# Patient Record
Sex: MALE | Race: WHITE | Employment: OTHER | URBAN - METROPOLITAN AREA
[De-identification: names, ages, dates, MRNs, and addresses within clinical notes are randomized per-mention and may not be internally consistent; named-entity substitution may affect disease eponyms.]

---

## 2020-11-02 ENCOUNTER — TELEPHONE (OUTPATIENT)
Dept: CARDIOLOGY | Facility: CLINIC | Age: 60
End: 2020-11-02

## 2020-11-02 NOTE — TELEPHONE ENCOUNTER
Patient initially called scheduling, who transferred the call to triage because he mentioned having chest pain. Patient has been having SOB going up stairs and chest pain over the past week or so. He said he is not SOB when he is not moving but as soon as he moves,eeven across the room he is SOB. He said he had an episode on Saturday with the pain that lasted quite a while. Today he is not having symptoms when we are talking but he is sitting still. He said he does have a cardiac history from a number of years ago. He was treated in [place] at that time. He saw a DrMahamed at the [facility] once but didn't go back. He has a primary there and none here in the [facility]. I told him if he could get an appointment in the next couple of days he could wait but otherwise he needs to go to the ER. I also told him if he gets worse SOB and/or chest pain before his appointment he needs to go to the ER. Patient has been scheduled 11/3/20 with Dr. Haider.    I called patient back and asked him to contact [place] for permission to release his records. He does not have an STAN[facility]. He said he does have records from [place]. I told him to bring them with and either his medications or a list of his medications. He also took our fax number in case [place] would fax records.

## 2020-11-03 ENCOUNTER — OFFICE VISIT (OUTPATIENT)
Dept: CARDIOLOGY | Facility: CLINIC | Age: 60
End: 2020-11-03
Payer: COMMERCIAL

## 2020-11-03 VITALS
OXYGEN SATURATION: 99 % | HEIGHT: 70 IN | WEIGHT: 230 LBS | HEART RATE: 90 BPM | DIASTOLIC BLOOD PRESSURE: 108 MMHG | SYSTOLIC BLOOD PRESSURE: 195 MMHG | BODY MASS INDEX: 32.93 KG/M2

## 2020-11-03 DIAGNOSIS — I10 MALIGNANT ESSENTIAL HYPERTENSION: ICD-10-CM

## 2020-11-03 DIAGNOSIS — C61 PROSTATE CANCER (H): ICD-10-CM

## 2020-11-03 DIAGNOSIS — I25.110 CORONARY ARTERY DISEASE INVOLVING NATIVE CORONARY ARTERY OF NATIVE HEART WITH UNSTABLE ANGINA PECTORIS (H): Primary | ICD-10-CM

## 2020-11-03 DIAGNOSIS — E78.2 MIXED HYPERLIPIDEMIA: ICD-10-CM

## 2020-11-03 PROCEDURE — 99000 SPECIMEN HANDLING OFFICE-LAB: CPT | Performed by: INTERNAL MEDICINE

## 2020-11-03 PROCEDURE — 36415 COLL VENOUS BLD VENIPUNCTURE: CPT | Performed by: INTERNAL MEDICINE

## 2020-11-03 PROCEDURE — 84244 ASSAY OF RENIN: CPT | Mod: 90 | Performed by: INTERNAL MEDICINE

## 2020-11-03 PROCEDURE — 82088 ASSAY OF ALDOSTERONE: CPT | Performed by: INTERNAL MEDICINE

## 2020-11-03 PROCEDURE — 93000 ELECTROCARDIOGRAM COMPLETE: CPT | Performed by: INTERNAL MEDICINE

## 2020-11-03 PROCEDURE — 99N1160 PR STATISICAL ALDOSTERONE/RENIN RATIO: Performed by: INTERNAL MEDICINE

## 2020-11-03 PROCEDURE — 83835 ASSAY OF METANEPHRINES: CPT | Mod: 90 | Performed by: INTERNAL MEDICINE

## 2020-11-03 PROCEDURE — 99205 OFFICE O/P NEW HI 60 MIN: CPT | Performed by: INTERNAL MEDICINE

## 2020-11-03 RX ORDER — ROSUVASTATIN CALCIUM 20 MG/1
20 TABLET, COATED ORAL DAILY
COMMUNITY
End: 2020-11-04

## 2020-11-03 RX ORDER — AMLODIPINE BESYLATE 10 MG/1
10 TABLET ORAL DAILY
Qty: 90 TABLET | Refills: 3 | Status: ON HOLD | OUTPATIENT
Start: 2020-11-03 | End: 2020-11-14

## 2020-11-03 RX ORDER — BUSPIRONE HYDROCHLORIDE 10 MG/1
10 TABLET ORAL 3 TIMES DAILY
COMMUNITY
End: 2021-04-28

## 2020-11-03 RX ORDER — NITROGLYCERIN 0.3 MG/1
TABLET SUBLINGUAL
Qty: 30 TABLET | Refills: 3 | Status: SHIPPED | OUTPATIENT
Start: 2020-11-03

## 2020-11-03 RX ORDER — TRIAMTERENE AND HYDROCHLOROTHIAZIDE 37.5; 25 MG/1; MG/1
CAPSULE ORAL EVERY MORNING
Status: ON HOLD | COMMUNITY
End: 2020-11-14

## 2020-11-03 RX ORDER — LOSARTAN POTASSIUM 50 MG/1
50 TABLET ORAL DAILY
COMMUNITY
End: 2020-11-15

## 2020-11-03 RX ORDER — POTASSIUM CHLORIDE 1500 MG/1
20 TABLET, EXTENDED RELEASE ORAL DAILY
Status: ON HOLD | COMMUNITY
End: 2020-11-14

## 2020-11-03 ASSESSMENT — MIFFLIN-ST. JEOR: SCORE: 1849.52

## 2020-11-03 NOTE — LETTER
"11/3/2020    Physician No Ref-Primary  No address on file    RE: Adrian BYERS Malinda       Dear Colleague,    I had the pleasure of seeing Adrian Petty in the [facility].    CARDIOLOGY CLINIC CONSULTATION    REASON FOR CONSULT: Chest pain, history of CAD    PRIMARY CARE PHYSICIAN:  Physician No Ref-Primary      HISTORY OF PRESENT ILLNESS:  Patient is a very pleasant 60-year-old male with a history of CAD s/p at least 3 prior stenting episodes at [facility], and [facility] in [place], most recently in 2013, though the records are not available for my review, as well as hypertension, hyperlipidemia, and prostate cancer s/p surgery, chemotherapy, and radiation currently in remission.  The patient had called the scheduling line earlier this week to make an appointment, but was referred for an urgent appointment today due to his unstable symptoms.  He initially stated that over the past week he has developed new, severe shortness of breath and chest discomfort when climbing the stairs that had not been present previously.  On further questioning, though, it does seem like this is getting worse, but it has been slowly building over the course of several months.  He did have one episode, though, on Saturday which was particularly worrisome, where the chest pressure came on as usual but did not resolve for several hours despite rest.  All previous times this has resolved after several minutes of rest.  On that Saturday, he eventually took 2 aspirin, and this helped to relieve the symptoms.  In retrospect, the symptoms feel very similar to the symptoms he had prior to his first stenting procedure.  Today, blood pressure is dramatically elevated at nearly 200 mmHg systolic.  He states that his blood pressure has \"always been high,\" though on my review of the limited care everywhere records available, it appears that within the past couple of years, he has a documented blood pressure " of 140/80, so this does seem to be out of his long-term typical range.  He brings in a blood pressure log from home showing that over at least the past week his blood pressure has been consistently in this 190s to 200s over 100s to 110s range.  He denies any headaches, dizziness, strokelike symptoms, or vision changes.    PAST MEDICAL HISTORY:  See HPI      MEDICATIONS:  Current Outpatient Medications   Medication     amLODIPine (NORVASC) 10 MG tablet     busPIRone (BUSPAR) 10 MG tablet     losartan (COZAAR) 50 MG tablet     potassium chloride ER (KLOR-CON M) 20 MEQ CR tablet     rosuvastatin (CRESTOR) 20 MG tablet     sertraline (ZOLOFT) 50 MG tablet     triamterene-HCTZ (DYAZIDE) 37.5-25 MG capsule     No current facility-administered medications for this visit.        ALLERGIES:  No Known Allergies    SOCIAL HISTORY:  I have reviewed this patient's social history and updated it with pertinent information if needed.   Adrian Petty  reports that he has never smoked. He does not have any smokeless tobacco history on file. He reports current alcohol use.    FAMILY HISTORY:  I have reviewed this patient's family history and updated it with pertinent information if needed.   Family History   Problem Relation Age of Onset     Coronary Artery Disease Father        REVIEW OF SYSTEMS:  I have reviewed this patient's ROS as obtained by clinic staff and updated it with pertinent information if needed.   Skin:  not assessed     Eyes:  not assessed    ENT:  not assessed    Respiratory:  Positive for dyspnea on exertion  Cardiovascular:  Negative;palpitations Positive for;chest pain;lightheadedness;dizziness;fatigue  Gastroenterology: not assessed    Genitourinary:  Positive for prostate problem  Musculoskeletal:  not assessed    Neurologic:  not assessed    Psychiatric:  not assessed    Heme/Lymph/Imm:  Negative    Endocrine:  Negative        PHYSICAL EXAM:      BP: (!) 195/108 Pulse: 90     SpO2: 99 %      Vital Signs  with Ranges  Pulse:  [90] 90  BP: (195)/(108) 195/108  SpO2:  [99 %] 99 %  230 lbs 0 oz    Constitutional:  Awake, alert, no acute distress  Eyes: EOMI, sclera non-icteric  ENT: Trachea midline  Respiratory: Normal respiratory effort, CTAB  Cardiovascular: RRR, no m/r/g.  JVP < 7 cm H2O.  No LE edema.  Normal carotid upstrokes, no carotid bruits.  GI:  Nondistended, nontender.  Skin: Dry, no significant rash on exposed skin  Musculoskeletal:  Strength 5/5 in upper and lower extremities  Psychiatric: Appropriate affect      DATA:    Labs: I have personally reviewed the pertinent cardiac labs.    EKG (11/3/2020): Normal ECG    Echocardiogram: None in our system    Angiogram: None in our system        ASSESSMENT AND RECOMMENDATIONS:    1.  Typical, accelerating angina  2.  History of CAD s/p 3 prior stenting procedures (unknown vessels)  3.  Malignant hypertension, likely subacute to chronic, not consistent with hypertensive emergency  4.  Hyperlipidemia  5.  Prostate cancer s/p surgery, radiation, and chemotherapy, now in remission    -I discussed with the patient and his daughter that it is very likely that he has recurrent obstructive coronary disease, and the accelerating pattern of this angina requires rapid investigation and treatment.  First off, I advised them that if another episode similar to Saturday occurs, he needs to come in immediately to the hospital for assessment.  Otherwise, we will set him up for an urgent coronary angiogram, and I will overbook him on Thursday, November 5.  In light of the Covid pandemic, this will be challenging to coordinate, but we will try and get his Covid test done either later today or first thing Wednesday morning, with the hope of having resulted prior to his appointment.  He also needs an echocardiogram, which I have ordered.    -Continue Crestor 20 daily  -Start baby aspirin daily  -Start amlodipine 10 mg daily, and continue losartan 50 mg daily and triamterene-HCTZ  37.5-25 mg daily  - Work-up for secondary hypertension initiated with reading/Jeremiah ratio, cortisol, metanephrines, TSH, and a renal ultrasound ordered.  -Follow-up in 1 month with PALMA to further titrate antihypertensive regimen.    Risks and benefits of the procedure were discussed with the patient in detail that includes but not limited to the risk of stroke, heart attack, death, cardiac or vascular perforation, emergent stenting or bypass, contrast induced allergic reaction, renal dysfunction (including risks of temporary or permanent dialysis), and pulmonary, sedation, and vascular complications (including bleeding and transfusion). Patient denies any major active bleeding issues and is willing to take and comply with dual antiplatelet therapy and understands associated bleeding risks. Patient understands the overall risks of the procedure and wishes to proceed.              Ted Haider MD  Interventional Cardiology  November 3, 2020            Thank you for allowing me to participate in the care of your patient.      Sincerely,     Ted Haider MD     [facility]    cc:   No referring provider defined for this encounter.

## 2020-11-03 NOTE — PROGRESS NOTES
"CARDIOLOGY CLINIC CONSULTATION    REASON FOR CONSULT: Chest pain, history of CAD    PRIMARY CARE PHYSICIAN:  Physician No Ref-Primary      HISTORY OF PRESENT ILLNESS:  Patient is a very pleasant 60-year-old male with a history of CAD s/p at least 3 prior stenting episodes at [facility], and [facility] in [place], most recently in 2013, though the records are not available for my review, as well as hypertension, hyperlipidemia, and prostate cancer s/p surgery, chemotherapy, and radiation currently in remission.  The patient had called the scheduling line earlier this week to make an appointment, but was referred for an urgent appointment today due to his unstable symptoms.  He initially stated that over the past week he has developed new, severe shortness of breath and chest discomfort when climbing the stairs that had not been present previously.  On further questioning, though, it does seem like this is getting worse, but it has been slowly building over the course of several months.  He did have one episode, though, on Saturday which was particularly worrisome, where the chest pressure came on as usual but did not resolve for several hours despite rest.  All previous times this has resolved after several minutes of rest.  On that Saturday, he eventually took 2 aspirin, and this helped to relieve the symptoms.  In retrospect, the symptoms feel very similar to the symptoms he had prior to his first stenting procedure.  Today, blood pressure is dramatically elevated at nearly 200 mmHg systolic.  He states that his blood pressure has \"always been high,\" though on my review of the limited care everywhere records available, it appears that within the past couple of years, he has a documented blood pressure of 140/80, so this does seem to be out of his long-term typical range.  He brings in a blood pressure log from home showing that over at least the past week his blood pressure has been consistently in this " 190s to 200s over 100s to 110s range.  He denies any headaches, dizziness, strokelike symptoms, or vision changes.    PAST MEDICAL HISTORY:  See HPI      MEDICATIONS:  Current Outpatient Medications   Medication     amLODIPine (NORVASC) 10 MG tablet     busPIRone (BUSPAR) 10 MG tablet     losartan (COZAAR) 50 MG tablet     potassium chloride ER (KLOR-CON M) 20 MEQ CR tablet     rosuvastatin (CRESTOR) 20 MG tablet     sertraline (ZOLOFT) 50 MG tablet     triamterene-HCTZ (DYAZIDE) 37.5-25 MG capsule     No current facility-administered medications for this visit.        ALLERGIES:  No Known Allergies    SOCIAL HISTORY:  I have reviewed this patient's social history and updated it with pertinent information if needed.   Adrian Petty  reports that he has never smoked. He does not have any smokeless tobacco history on file. He reports current alcohol use.    FAMILY HISTORY:  I have reviewed this patient's family history and updated it with pertinent information if needed.   Family History   Problem Relation Age of Onset     Coronary Artery Disease Father        REVIEW OF SYSTEMS:  I have reviewed this patient's ROS as obtained by clinic staff and updated it with pertinent information if needed.   Skin:  not assessed     Eyes:  not assessed    ENT:  not assessed    Respiratory:  Positive for dyspnea on exertion  Cardiovascular:  Negative;palpitations Positive for;chest pain;lightheadedness;dizziness;fatigue  Gastroenterology: not assessed    Genitourinary:  Positive for prostate problem  Musculoskeletal:  not assessed    Neurologic:  not assessed    Psychiatric:  not assessed    Heme/Lymph/Imm:  Negative    Endocrine:  Negative        PHYSICAL EXAM:      BP: (!) 195/108 Pulse: 90     SpO2: 99 %      Vital Signs with Ranges  Pulse:  [90] 90  BP: (195)/(108) 195/108  SpO2:  [99 %] 99 %  230 lbs 0 oz    Constitutional:  Awake, alert, no acute distress  Eyes: EOMI, sclera non-icteric  ENT: Trachea midline  Respiratory:  Normal respiratory effort, CTAB  Cardiovascular: RRR, no m/r/g.  JVP < 7 cm H2O.  No LE edema.  Normal carotid upstrokes, no carotid bruits.  GI:  Nondistended, nontender.  Skin: Dry, no significant rash on exposed skin  Musculoskeletal:  Strength 5/5 in upper and lower extremities  Psychiatric: Appropriate affect      DATA:    Labs: I have personally reviewed the pertinent cardiac labs.    EKG (11/3/2020): Normal ECG    Echocardiogram: None in our system    Angiogram: None in our system        ASSESSMENT AND RECOMMENDATIONS:    1.  Typical, accelerating angina  2.  History of CAD s/p 3 prior stenting procedures (unknown vessels)  3.  Malignant hypertension, likely subacute to chronic, not consistent with hypertensive emergency  4.  Hyperlipidemia  5.  Prostate cancer s/p surgery, radiation, and chemotherapy, now in remission    -I discussed with the patient and his daughter that it is very likely that he has recurrent obstructive coronary disease, and the accelerating pattern of this angina requires rapid investigation and treatment.  First off, I advised them that if another episode similar to Saturday occurs, he needs to come in immediately to the hospital for assessment.  Otherwise, we will set him up for an urgent coronary angiogram, and I will overbook him on Thursday, November 5.  In light of the Covid pandemic, this will be challenging to coordinate, but we will try and get his Covid test done either later today or first thing Wednesday morning, with the hope of having resulted prior to his appointment.  He also needs an echocardiogram, which I have ordered.    -Continue Crestor 20 daily  -Start baby aspirin daily  -Start amlodipine 10 mg daily, and continue losartan 50 mg daily and triamterene-HCTZ 37.5-25 mg daily  - Work-up for secondary hypertension initiated with reading/Jeremiah ratio, cortisol, metanephrines, TSH, and a renal ultrasound ordered.  -Follow-up in 1 month with PALMA to further titrate  antihypertensive regimen.    Risks and benefits of the procedure were discussed with the patient in detail that includes but not limited to the risk of stroke, heart attack, death, cardiac or vascular perforation, emergent stenting or bypass, contrast induced allergic reaction, renal dysfunction (including risks of temporary or permanent dialysis), and pulmonary, sedation, and vascular complications (including bleeding and transfusion). Patient denies any major active bleeding issues and is willing to take and comply with dual antiplatelet therapy and understands associated bleeding risks. Patient understands the overall risks of the procedure and wishes to proceed.              Ted Haider MD  Interventional Cardiology  November 3, 2020

## 2020-11-04 ENCOUNTER — TELEPHONE (OUTPATIENT)
Dept: CARDIOLOGY | Facility: CLINIC | Age: 60
End: 2020-11-04

## 2020-11-04 ENCOUNTER — DOCUMENTATION ONLY (OUTPATIENT)
Dept: CARDIOLOGY | Facility: CLINIC | Age: 60
End: 2020-11-04

## 2020-11-04 DIAGNOSIS — I25.110 CORONARY ARTERY DISEASE INVOLVING NATIVE CORONARY ARTERY OF NATIVE HEART WITH UNSTABLE ANGINA PECTORIS (H): ICD-10-CM

## 2020-11-04 DIAGNOSIS — I10 MALIGNANT ESSENTIAL HYPERTENSION: ICD-10-CM

## 2020-11-04 DIAGNOSIS — I25.110 CORONARY ARTERY DISEASE INVOLVING NATIVE CORONARY ARTERY OF NATIVE HEART WITH UNSTABLE ANGINA PECTORIS (H): Primary | ICD-10-CM

## 2020-11-04 DIAGNOSIS — R97.20 ELEVATED PROSTATE SPECIFIC ANTIGEN (PSA): Primary | ICD-10-CM

## 2020-11-04 LAB
ANION GAP SERPL CALCULATED.3IONS-SCNC: 5 MMOL/L (ref 3–14)
APTT PPP: 27 SEC (ref 22–37)
BUN SERPL-MCNC: 17 MG/DL (ref 7–30)
CALCIUM SERPL-MCNC: 9.1 MG/DL (ref 8.5–10.1)
CHLORIDE SERPL-SCNC: 100 MMOL/L (ref 94–109)
CHOLEST SERPL-MCNC: 239 MG/DL
CO2 SERPL-SCNC: 30 MMOL/L (ref 20–32)
CORTIS SERPL-MCNC: 9 UG/DL (ref 4–22)
CREAT SERPL-MCNC: 0.88 MG/DL (ref 0.66–1.25)
ERYTHROCYTE [DISTWIDTH] IN BLOOD BY AUTOMATED COUNT: 12.9 % (ref 10–15)
GFR SERPL CREATININE-BSD FRML MDRD: >90 ML/MIN/{1.73_M2}
GLUCOSE SERPL-MCNC: 232 MG/DL (ref 70–99)
HBA1C MFR BLD: 8 % (ref 0–5.6)
HCT VFR BLD AUTO: 48.9 % (ref 40–53)
HDLC SERPL-MCNC: 36 MG/DL
HGB BLD-MCNC: 16.6 G/DL (ref 13.3–17.7)
INR PPP: 1.02 (ref 0.86–1.14)
LABORATORY COMMENT REPORT: NORMAL
LDLC SERPL CALC-MCNC: ABNORMAL MG/DL
MCH RBC QN AUTO: 28.9 PG (ref 26.5–33)
MCHC RBC AUTO-ENTMCNC: 33.9 G/DL (ref 31.5–36.5)
MCV RBC AUTO: 85 FL (ref 78–100)
NONHDLC SERPL-MCNC: 203 MG/DL
PLATELET # BLD AUTO: 212 10E9/L (ref 150–450)
POTASSIUM SERPL-SCNC: 3.3 MMOL/L (ref 3.4–5.3)
PSA SERPL-MCNC: 0.17 UG/L (ref 0–4)
RBC # BLD AUTO: 5.75 10E12/L (ref 4.4–5.9)
SARS-COV-2 RNA SPEC QL NAA+PROBE: NEGATIVE
SARS-COV-2 RNA SPEC QL NAA+PROBE: NORMAL
SODIUM SERPL-SCNC: 135 MMOL/L (ref 133–144)
SPECIMEN SOURCE: NORMAL
SPECIMEN SOURCE: NORMAL
TRIGL SERPL-MCNC: 459 MG/DL
TSH SERPL DL<=0.005 MIU/L-ACNC: 0.84 MU/L (ref 0.4–4)
WBC # BLD AUTO: 6.1 10E9/L (ref 4–11)

## 2020-11-04 PROCEDURE — 36415 COLL VENOUS BLD VENIPUNCTURE: CPT | Performed by: INTERNAL MEDICINE

## 2020-11-04 PROCEDURE — 85027 COMPLETE CBC AUTOMATED: CPT | Performed by: INTERNAL MEDICINE

## 2020-11-04 PROCEDURE — 85730 THROMBOPLASTIN TIME PARTIAL: CPT | Performed by: INTERNAL MEDICINE

## 2020-11-04 PROCEDURE — 82533 TOTAL CORTISOL: CPT | Performed by: INTERNAL MEDICINE

## 2020-11-04 PROCEDURE — 80048 BASIC METABOLIC PNL TOTAL CA: CPT | Performed by: INTERNAL MEDICINE

## 2020-11-04 PROCEDURE — 84443 ASSAY THYROID STIM HORMONE: CPT | Performed by: INTERNAL MEDICINE

## 2020-11-04 PROCEDURE — U0003 INFECTIOUS AGENT DETECTION BY NUCLEIC ACID (DNA OR RNA); SEVERE ACUTE RESPIRATORY SYNDROME CORONAVIRUS 2 (SARS-COV-2) (CORONAVIRUS DISEASE [COVID-19]), AMPLIFIED PROBE TECHNIQUE, MAKING USE OF HIGH THROUGHPUT TECHNOLOGIES AS DESCRIBED BY CMS-2020-01-R: HCPCS | Performed by: INTERNAL MEDICINE

## 2020-11-04 PROCEDURE — 85610 PROTHROMBIN TIME: CPT | Performed by: INTERNAL MEDICINE

## 2020-11-04 PROCEDURE — 84153 ASSAY OF PSA TOTAL: CPT | Performed by: INTERNAL MEDICINE

## 2020-11-04 PROCEDURE — 83036 HEMOGLOBIN GLYCOSYLATED A1C: CPT | Performed by: INTERNAL MEDICINE

## 2020-11-04 PROCEDURE — 80061 LIPID PANEL: CPT | Performed by: INTERNAL MEDICINE

## 2020-11-04 RX ORDER — ASPIRIN 81 MG/1
81 TABLET ORAL DAILY
Status: CANCELLED | OUTPATIENT
Start: 2020-11-04 | End: 2020-11-06

## 2020-11-04 RX ORDER — SODIUM CHLORIDE 9 MG/ML
INJECTION, SOLUTION INTRAVENOUS CONTINUOUS
Status: CANCELLED | OUTPATIENT
Start: 2020-11-04

## 2020-11-04 RX ORDER — LIDOCAINE 40 MG/G
CREAM TOPICAL
Status: CANCELLED | OUTPATIENT
Start: 2020-11-04

## 2020-11-04 RX ORDER — POTASSIUM CHLORIDE 1500 MG/1
20 TABLET, EXTENDED RELEASE ORAL
Status: CANCELLED | OUTPATIENT
Start: 2020-11-04

## 2020-11-04 RX ORDER — ROSUVASTATIN CALCIUM 40 MG/1
40 TABLET, COATED ORAL DAILY
Qty: 90 TABLET | Refills: 3 | Status: SHIPPED | OUTPATIENT
Start: 2020-11-04 | End: 2021-05-27

## 2020-11-04 NOTE — TELEPHONE ENCOUNTER
Received result note from Dr. Haider regarding pt's lab results:     Ted Haider MD [facility] Heart Team 1             Hi Rena,     It looks like he has diabetes now, so he needs to establish with a PCP that can help him manage that.     Also, let's increase his Crestor from 20 -> 40 mg daily.     Otherwise, we're just waiting on his COVID test to come back, hopefully it will soon.     Thanks!   Carlos      Called pt and reviewed results, recommendation from Dr. Haider to establish with a PCP to manage diabetes and increase rosuvastatin from 20 to 40 mg daily. Pt verbalized understanding and agreement with plan. Rx sent to pt's preferred pharmacy. Provided pt with phone number for [facility], pt stated he will call and make an appointment to establish with a PCP. Reviewed pt's COVID test has also resulted and was negative.

## 2020-11-04 NOTE — PROGRESS NOTES
Contacted patient to review pre-procedure instructions: patient is scheduled for coronary angiogram on 11/5/20. Patient's arrival time is 8:30 am and procedure time is 10:30 am. Patient is aware to be NPO except for medications after midnight tonight. Patient will hold the medication triamterene-hydrochlorothiazide. Patient has arranged for transportation and someone to stay with him for 24 hours after the procedure. Patient has no known contrast dye allergy. Patient is not on any diabetic medications. Patient is not taking anti-coagulation medication. Patient's renal function is WNL for procedure. Patient will continue daily aspirin dose 81 mg. Orders for procedure entered. Patient verbalized understanding of all instructions. Patient had a COVID test this morning, results pending, this RN called lab and requested result be expedited.  Patient has post-angiogram follow up scheduled on 11/13/20 at 8:00 am with CADEN Radford.     Wellness Screening Tool    Symptom Screening:    Do you have one of the following NEW symptoms:      Fever (subjective or >100.0)? No     New cough? No     Shortness of breath? No     Chills? No     New loss of taste or smell? No     Generalized body aches? No     New persistent headache? No     New sore throat? No     Nausea, vomiting or diarrhea? No     Within the past 2 weeks, have you been exposed to someone with a known positive illness below?      COVID - 19 (known or suspected)? No     Chicken pox? No     Measles? No     Pertussis? No     Have you had a positive COVID-19 diagnostic test (nasal swab test) in the last 14 days or are you currently   on self-quarantine restrictions (i.e.travel restriction, exposure, etc?) No     Patient notified of visitor restriction: Yes   Patient informed to wear a mask: Yes     Patient's appointment status: Patient will keep procedure as scheduled on 11/4/20.

## 2020-11-05 ENCOUNTER — APPOINTMENT (OUTPATIENT)
Dept: ULTRASOUND IMAGING | Facility: CLINIC | Age: 60
End: 2020-11-05
Attending: SURGERY
Payer: COMMERCIAL

## 2020-11-05 ENCOUNTER — HOSPITAL ENCOUNTER (INPATIENT)
Facility: CLINIC | Age: 60
LOS: 9 days | Discharge: HOME | End: 2020-11-14
Attending: INTERNAL MEDICINE | Admitting: HOSPITALIST
Payer: COMMERCIAL

## 2020-11-05 ENCOUNTER — HOSPITAL ENCOUNTER (OUTPATIENT)
Dept: CARDIOLOGY | Facility: CLINIC | Age: 60
Discharge: HOME | End: 2020-11-05
Attending: INTERNAL MEDICINE | Admitting: INTERNAL MEDICINE
Payer: COMMERCIAL

## 2020-11-05 ENCOUNTER — APPOINTMENT (OUTPATIENT)
Dept: GENERAL RADIOLOGY | Facility: CLINIC | Age: 60
End: 2020-11-05
Attending: NURSE PRACTITIONER
Payer: COMMERCIAL

## 2020-11-05 DIAGNOSIS — I25.110 CORONARY ARTERY DISEASE INVOLVING NATIVE CORONARY ARTERY OF NATIVE HEART WITH UNSTABLE ANGINA PECTORIS (H): ICD-10-CM

## 2020-11-05 DIAGNOSIS — Z95.1 S/P CABG X 3: ICD-10-CM

## 2020-11-05 DIAGNOSIS — I25.10 CAD (CORONARY ARTERY DISEASE): ICD-10-CM

## 2020-11-05 DIAGNOSIS — I25.110 CORONARY ARTERY DISEASE INVOLVING NATIVE CORONARY ARTERY OF NATIVE HEART WITH UNSTABLE ANGINA PECTORIS (H): Primary | ICD-10-CM

## 2020-11-05 LAB
ABO + RH BLD: NORMAL
ALBUMIN SERPL-MCNC: 4.2 G/DL (ref 3.4–5)
ALBUMIN UR-MCNC: NEGATIVE MG/DL
ALDOST SERPL-MCNC: 37.3 NG/DL (ref 0–31)
ALP SERPL-CCNC: 61 U/L (ref 40–150)
ALT SERPL W P-5'-P-CCNC: 36 U/L (ref 0–70)
APPEARANCE UR: CLEAR
AST SERPL W P-5'-P-CCNC: 19 U/L (ref 0–45)
BILIRUB DIRECT SERPL-MCNC: 0.1 MG/DL (ref 0–0.2)
BILIRUB SERPL-MCNC: 0.7 MG/DL (ref 0.2–1.3)
BILIRUB UR QL STRIP: NEGATIVE
BLD GP AB SCN SERPL QL: NORMAL
BLOOD BANK CMNT PATIENT-IMP: NORMAL
COLOR UR AUTO: YELLOW
GLUCOSE BLDC GLUCOMTR-MCNC: 340 MG/DL (ref 70–99)
GLUCOSE UR STRIP-MCNC: >1000 MG/DL
HGB UR QL STRIP: NEGATIVE
INR PPP: 1.07 (ref 0.86–1.14)
KETONES UR STRIP-MCNC: 10 MG/DL
LEUKOCYTE ESTERASE UR QL STRIP: NEGATIVE
MUCOUS THREADS #/AREA URNS LPF: PRESENT /LPF
NITRATE UR QL: NEGATIVE
PH UR STRIP: 6 PH (ref 5–7)
PROT SERPL-MCNC: 8 G/DL (ref 6.8–8.8)
RBC #/AREA URNS AUTO: <1 /HPF (ref 0–2)
SOURCE: ABNORMAL
SP GR UR STRIP: 1.01 (ref 1–1.03)
SPECIMEN EXP DATE BLD: NORMAL
SPECIMEN EXP DATE BLD: NORMAL
TROPONIN I SERPL-MCNC: 0.04 UG/L (ref 0–0.04)
TROPONIN I SERPL-MCNC: <0.015 UG/L (ref 0–0.04)
UROBILINOGEN UR STRIP-MCNC: NORMAL MG/DL (ref 0–2)
WBC #/AREA URNS AUTO: <1 /HPF (ref 0–5)

## 2020-11-05 PROCEDURE — 999N001017 HC STATISTIC GLUCOSE BY METER IP

## 2020-11-05 PROCEDURE — 86900 BLOOD TYPING SEROLOGIC ABO: CPT | Performed by: SURGERY

## 2020-11-05 PROCEDURE — 250N000009 HC RX 250: Performed by: NURSE PRACTITIONER

## 2020-11-05 PROCEDURE — 93306 TTE W/DOPPLER COMPLETE: CPT

## 2020-11-05 PROCEDURE — 258N000003 HC RX IP 258 OP 636: Performed by: INTERNAL MEDICINE

## 2020-11-05 PROCEDURE — 86901 BLOOD TYPING SEROLOGIC RH(D): CPT | Performed by: SURGERY

## 2020-11-05 PROCEDURE — 93306 TTE W/DOPPLER COMPLETE: CPT | Mod: 26 | Performed by: INTERNAL MEDICINE

## 2020-11-05 PROCEDURE — 86900 BLOOD TYPING SEROLOGIC ABO: CPT | Performed by: INTERNAL MEDICINE

## 2020-11-05 PROCEDURE — 999N000071 HC STATISTIC HEART CATH LAB OR EP LAB

## 2020-11-05 PROCEDURE — 999N000184 HC STATISTIC TELEMETRY

## 2020-11-05 PROCEDURE — 272N000001 HC OR GENERAL SUPPLY STERILE: Performed by: INTERNAL MEDICINE

## 2020-11-05 PROCEDURE — 86850 RBC ANTIBODY SCREEN: CPT | Performed by: SURGERY

## 2020-11-05 PROCEDURE — 93010 ELECTROCARDIOGRAM REPORT: CPT | Performed by: INTERNAL MEDICINE

## 2020-11-05 PROCEDURE — 250N000013 HC RX MED GY IP 250 OP 250 PS 637: Performed by: NURSE PRACTITIONER

## 2020-11-05 PROCEDURE — 36415 COLL VENOUS BLD VENIPUNCTURE: CPT | Performed by: NURSE PRACTITIONER

## 2020-11-05 PROCEDURE — 250N000011 HC RX IP 250 OP 636: Performed by: NURSE PRACTITIONER

## 2020-11-05 PROCEDURE — 36415 COLL VENOUS BLD VENIPUNCTURE: CPT | Performed by: INTERNAL MEDICINE

## 2020-11-05 PROCEDURE — 71045 X-RAY EXAM CHEST 1 VIEW: CPT

## 2020-11-05 PROCEDURE — 250N000011 HC RX IP 250 OP 636: Performed by: INTERNAL MEDICINE

## 2020-11-05 PROCEDURE — 250N000013 HC RX MED GY IP 250 OP 250 PS 637: Performed by: INTERNAL MEDICINE

## 2020-11-05 PROCEDURE — 81001 URINALYSIS AUTO W/SCOPE: CPT | Performed by: SURGERY

## 2020-11-05 PROCEDURE — 80076 HEPATIC FUNCTION PANEL: CPT | Performed by: SURGERY

## 2020-11-05 PROCEDURE — 93970 EXTREMITY STUDY: CPT

## 2020-11-05 PROCEDURE — C1894 INTRO/SHEATH, NON-LASER: HCPCS | Performed by: INTERNAL MEDICINE

## 2020-11-05 PROCEDURE — 210N000002 HC R&B HEART CARE

## 2020-11-05 PROCEDURE — B2111ZZ FLUOROSCOPY OF MULTIPLE CORONARY ARTERIES USING LOW OSMOLAR CONTRAST: ICD-10-PCS | Performed by: INTERNAL MEDICINE

## 2020-11-05 PROCEDURE — C1725 CATH, TRANSLUMIN NON-LASER: HCPCS | Performed by: INTERNAL MEDICINE

## 2020-11-05 PROCEDURE — 99207 PR CDG-MDM COMPONENT: MEETS HIGH - UP CODED: CPT | Performed by: HOSPITALIST

## 2020-11-05 PROCEDURE — 85610 PROTHROMBIN TIME: CPT | Performed by: NURSE PRACTITIONER

## 2020-11-05 PROCEDURE — 93454 CORONARY ARTERY ANGIO S&I: CPT | Performed by: INTERNAL MEDICINE

## 2020-11-05 PROCEDURE — 99223 1ST HOSP IP/OBS HIGH 75: CPT | Mod: AI | Performed by: HOSPITALIST

## 2020-11-05 PROCEDURE — 250N000012 HC RX MED GY IP 250 OP 636 PS 637: Performed by: HOSPITALIST

## 2020-11-05 PROCEDURE — 99291 CRITICAL CARE FIRST HOUR: CPT | Performed by: NURSE PRACTITIONER

## 2020-11-05 PROCEDURE — 250N000013 HC RX MED GY IP 250 OP 250 PS 637: Performed by: HOSPITALIST

## 2020-11-05 PROCEDURE — 250N000009 HC RX 250: Performed by: INTERNAL MEDICINE

## 2020-11-05 PROCEDURE — 84484 ASSAY OF TROPONIN QUANT: CPT | Performed by: NURSE PRACTITIONER

## 2020-11-05 PROCEDURE — 36415 COLL VENOUS BLD VENIPUNCTURE: CPT

## 2020-11-05 PROCEDURE — 93880 EXTRACRANIAL BILAT STUDY: CPT

## 2020-11-05 PROCEDURE — 93005 ELECTROCARDIOGRAM TRACING: CPT

## 2020-11-05 RX ORDER — ATROPINE SULFATE 0.1 MG/ML
0.5 INJECTION INTRAVENOUS
Status: ACTIVE | OUTPATIENT
Start: 2020-11-05 | End: 2020-11-05

## 2020-11-05 RX ORDER — DEXTROSE MONOHYDRATE 25 G/50ML
25-50 INJECTION, SOLUTION INTRAVENOUS
Status: DISCONTINUED | OUTPATIENT
Start: 2020-11-05 | End: 2020-11-08

## 2020-11-05 RX ORDER — SODIUM CHLORIDE 9 MG/ML
INJECTION, SOLUTION INTRAVENOUS CONTINUOUS
Status: DISCONTINUED | OUTPATIENT
Start: 2020-11-05 | End: 2020-11-09

## 2020-11-05 RX ORDER — LIDOCAINE 40 MG/G
CREAM TOPICAL
Status: DISCONTINUED | OUTPATIENT
Start: 2020-11-05 | End: 2020-11-05 | Stop reason: HOSPADM

## 2020-11-05 RX ORDER — HYDRALAZINE HYDROCHLORIDE 20 MG/ML
20 INJECTION INTRAMUSCULAR; INTRAVENOUS ONCE
Status: COMPLETED | OUTPATIENT
Start: 2020-11-05 | End: 2020-11-05

## 2020-11-05 RX ORDER — ASPIRIN 81 MG/1
81 TABLET ORAL DAILY
Status: DISCONTINUED | OUTPATIENT
Start: 2020-11-05 | End: 2020-11-05 | Stop reason: HOSPADM

## 2020-11-05 RX ORDER — ONDANSETRON 4 MG/1
4 TABLET, ORALLY DISINTEGRATING ORAL EVERY 6 HOURS PRN
Status: DISCONTINUED | OUTPATIENT
Start: 2020-11-05 | End: 2020-11-05

## 2020-11-05 RX ORDER — ACETAMINOPHEN 325 MG/1
650 TABLET ORAL EVERY 4 HOURS PRN
Status: DISCONTINUED | OUTPATIENT
Start: 2020-11-05 | End: 2020-11-05

## 2020-11-05 RX ORDER — FLUMAZENIL 0.1 MG/ML
0.2 INJECTION, SOLUTION INTRAVENOUS
Status: ACTIVE | OUTPATIENT
Start: 2020-11-05 | End: 2020-11-05

## 2020-11-05 RX ORDER — NITROGLYCERIN 5 MG/ML
VIAL (ML) INTRAVENOUS
Status: DISCONTINUED | OUTPATIENT
Start: 2020-11-05 | End: 2020-11-05 | Stop reason: HOSPADM

## 2020-11-05 RX ORDER — ONDANSETRON 4 MG/1
4 TABLET, ORALLY DISINTEGRATING ORAL EVERY 6 HOURS PRN
Status: DISCONTINUED | OUTPATIENT
Start: 2020-11-05 | End: 2020-11-09

## 2020-11-05 RX ORDER — NITROGLYCERIN 0.4 MG/1
0.4 TABLET SUBLINGUAL EVERY 5 MIN PRN
Status: DISCONTINUED | OUTPATIENT
Start: 2020-11-05 | End: 2020-11-09

## 2020-11-05 RX ORDER — LIDOCAINE 40 MG/G
CREAM TOPICAL
Status: DISCONTINUED | OUTPATIENT
Start: 2020-11-05 | End: 2020-11-05

## 2020-11-05 RX ORDER — AMLODIPINE BESYLATE 10 MG/1
10 TABLET ORAL DAILY
Status: DISCONTINUED | OUTPATIENT
Start: 2020-11-06 | End: 2020-11-09

## 2020-11-05 RX ORDER — ONDANSETRON 2 MG/ML
4 INJECTION INTRAMUSCULAR; INTRAVENOUS EVERY 6 HOURS PRN
Status: DISCONTINUED | OUTPATIENT
Start: 2020-11-05 | End: 2020-11-09

## 2020-11-05 RX ORDER — HEPARIN SODIUM 1000 [USP'U]/ML
INJECTION, SOLUTION INTRAVENOUS; SUBCUTANEOUS
Status: DISCONTINUED | OUTPATIENT
Start: 2020-11-05 | End: 2020-11-05 | Stop reason: HOSPADM

## 2020-11-05 RX ORDER — NALOXONE HYDROCHLORIDE 0.4 MG/ML
.1-.4 INJECTION, SOLUTION INTRAMUSCULAR; INTRAVENOUS; SUBCUTANEOUS
Status: DISCONTINUED | OUTPATIENT
Start: 2020-11-05 | End: 2020-11-09

## 2020-11-05 RX ORDER — ACETAMINOPHEN 650 MG/1
650 SUPPOSITORY RECTAL EVERY 4 HOURS PRN
Status: DISCONTINUED | OUTPATIENT
Start: 2020-11-05 | End: 2020-11-09

## 2020-11-05 RX ORDER — IOPAMIDOL 755 MG/ML
INJECTION, SOLUTION INTRAVASCULAR
Status: DISCONTINUED | OUTPATIENT
Start: 2020-11-05 | End: 2020-11-05 | Stop reason: HOSPADM

## 2020-11-05 RX ORDER — LIDOCAINE 40 MG/G
CREAM TOPICAL
Status: DISCONTINUED | OUTPATIENT
Start: 2020-11-05 | End: 2020-11-09

## 2020-11-05 RX ORDER — ACETAMINOPHEN 325 MG/1
650 TABLET ORAL EVERY 4 HOURS PRN
Status: DISCONTINUED | OUTPATIENT
Start: 2020-11-05 | End: 2020-11-09

## 2020-11-05 RX ORDER — POTASSIUM CHLORIDE 1500 MG/1
20 TABLET, EXTENDED RELEASE ORAL
Status: DISCONTINUED | OUTPATIENT
Start: 2020-11-05 | End: 2020-11-05 | Stop reason: HOSPADM

## 2020-11-05 RX ORDER — NICOTINE POLACRILEX 4 MG
15-30 LOZENGE BUCCAL
Status: DISCONTINUED | OUTPATIENT
Start: 2020-11-05 | End: 2020-11-08

## 2020-11-05 RX ORDER — ASPIRIN 81 MG/1
81 TABLET ORAL DAILY
Status: DISCONTINUED | OUTPATIENT
Start: 2020-11-06 | End: 2020-11-09

## 2020-11-05 RX ORDER — CARVEDILOL 12.5 MG/1
12.5 TABLET ORAL 2 TIMES DAILY WITH MEALS
Qty: 30 TABLET | Refills: 1 | Status: SHIPPED | OUTPATIENT
Start: 2020-11-05 | End: 2020-11-14

## 2020-11-05 RX ORDER — POLYETHYLENE GLYCOL 3350 17 G/17G
17 POWDER, FOR SOLUTION ORAL DAILY
Status: DISCONTINUED | OUTPATIENT
Start: 2020-11-05 | End: 2020-11-09

## 2020-11-05 RX ORDER — AMOXICILLIN 250 MG
2 CAPSULE ORAL 2 TIMES DAILY
Status: DISCONTINUED | OUTPATIENT
Start: 2020-11-05 | End: 2020-11-09

## 2020-11-05 RX ORDER — AMOXICILLIN 250 MG
1 CAPSULE ORAL 2 TIMES DAILY
Status: DISCONTINUED | OUTPATIENT
Start: 2020-11-05 | End: 2020-11-09

## 2020-11-05 RX ORDER — NALOXONE HYDROCHLORIDE 0.4 MG/ML
.2-.4 INJECTION, SOLUTION INTRAMUSCULAR; INTRAVENOUS; SUBCUTANEOUS
Status: DISCONTINUED | OUTPATIENT
Start: 2020-11-05 | End: 2020-11-05

## 2020-11-05 RX ORDER — ROSUVASTATIN CALCIUM 20 MG/1
40 TABLET, COATED ORAL DAILY
Status: DISCONTINUED | OUTPATIENT
Start: 2020-11-06 | End: 2020-11-14 | Stop reason: HOSPADM

## 2020-11-05 RX ORDER — NITROGLYCERIN 0.4 MG/1
0.4 TABLET SUBLINGUAL EVERY 5 MIN PRN
Status: DISCONTINUED | OUTPATIENT
Start: 2020-11-05 | End: 2020-11-05

## 2020-11-05 RX ORDER — ONDANSETRON 2 MG/ML
4 INJECTION INTRAMUSCULAR; INTRAVENOUS EVERY 6 HOURS PRN
Status: DISCONTINUED | OUTPATIENT
Start: 2020-11-05 | End: 2020-11-05

## 2020-11-05 RX ORDER — FENTANYL CITRATE 50 UG/ML
INJECTION, SOLUTION INTRAMUSCULAR; INTRAVENOUS
Status: DISCONTINUED | OUTPATIENT
Start: 2020-11-05 | End: 2020-11-05 | Stop reason: HOSPADM

## 2020-11-05 RX ORDER — FENTANYL CITRATE 50 UG/ML
25-50 INJECTION, SOLUTION INTRAMUSCULAR; INTRAVENOUS
Status: ACTIVE | OUTPATIENT
Start: 2020-11-05 | End: 2020-11-05

## 2020-11-05 RX ORDER — NITROGLYCERIN 20 MG/100ML
10-200 INJECTION INTRAVENOUS CONTINUOUS
Status: DISCONTINUED | OUTPATIENT
Start: 2020-11-05 | End: 2020-11-09

## 2020-11-05 RX ORDER — BUSPIRONE HYDROCHLORIDE 10 MG/1
10 TABLET ORAL 3 TIMES DAILY
Status: DISCONTINUED | OUTPATIENT
Start: 2020-11-05 | End: 2020-11-14 | Stop reason: HOSPADM

## 2020-11-05 RX ORDER — VERAPAMIL HYDROCHLORIDE 2.5 MG/ML
INJECTION, SOLUTION INTRAVENOUS
Status: DISCONTINUED | OUTPATIENT
Start: 2020-11-05 | End: 2020-11-05 | Stop reason: HOSPADM

## 2020-11-05 RX ORDER — SODIUM CHLORIDE 9 MG/ML
INJECTION, SOLUTION INTRAVENOUS CONTINUOUS
Status: DISCONTINUED | OUTPATIENT
Start: 2020-11-05 | End: 2020-11-05 | Stop reason: HOSPADM

## 2020-11-05 RX ADMIN — ACETAMINOPHEN 650 MG: 325 TABLET, FILM COATED ORAL at 14:00

## 2020-11-05 RX ADMIN — SODIUM CHLORIDE: 9 INJECTION, SOLUTION INTRAVENOUS at 09:22

## 2020-11-05 RX ADMIN — NITROGLYCERIN 0.4 MG: 0.4 TABLET SUBLINGUAL at 16:49

## 2020-11-05 RX ADMIN — MELATONIN 5 MG TABLET 10 MG: at 22:23

## 2020-11-05 RX ADMIN — NITROGLYCERIN 0.4 MG: 0.4 TABLET SUBLINGUAL at 16:33

## 2020-11-05 RX ADMIN — BUSPIRONE HYDROCHLORIDE 10 MG: 10 TABLET ORAL at 22:23

## 2020-11-05 RX ADMIN — SERTRALINE HYDROCHLORIDE 50 MG: 50 TABLET ORAL at 20:11

## 2020-11-05 RX ADMIN — HYDRALAZINE HYDROCHLORIDE 20 MG: 20 INJECTION INTRAMUSCULAR; INTRAVENOUS at 13:14

## 2020-11-05 RX ADMIN — SODIUM CHLORIDE: 9 INJECTION, SOLUTION INTRAVENOUS at 18:20

## 2020-11-05 RX ADMIN — ONDANSETRON 4 MG: 2 INJECTION INTRAMUSCULAR; INTRAVENOUS at 16:58

## 2020-11-05 RX ADMIN — LIDOCAINE HYDROCHLORIDE 30 ML: 20 SOLUTION ORAL; TOPICAL at 17:56

## 2020-11-05 RX ADMIN — INSULIN ASPART 2 UNITS: 100 INJECTION, SOLUTION INTRAVENOUS; SUBCUTANEOUS at 22:24

## 2020-11-05 RX ADMIN — NITROGLYCERIN 10 MCG/MIN: 20 INJECTION INTRAVENOUS at 17:54

## 2020-11-05 RX ADMIN — ASPIRIN 81 MG: 81 TABLET, COATED ORAL at 09:31

## 2020-11-05 NOTE — PROGRESS NOTES
Care Suites Admission Nursing Note    Patient Information  Name: Adrian Petty  Age: 60 year old  Reason for admission: angiogram  Care Suites arrival time: 0830    Visitor Information  Name: Martha  Informed of visitor restrictions: Yes  1 visitor allowed per patient   Visitor must screen negative for COVID symptoms   Visitor must wear a mask  Waiting rooms closed to visitors    Patient Admission/Assessment   Pre-procedure assessment complete: Yes  If abnormal assessment/labs, provider notified: Yes  NPO: Yes  Medications held per instructions/orders: Yes  Consent: obtained  If applicable, pregnancy test status: declined  Patient oriented to room: Yes  Education/questions answered: Yes  Plan/other: proceed    Discharge Planning  Discharge name/phone number: Martha sloan - [phone]  Overnight post sedation caregiver: above  Discharge location: home    Ted Mistry RN           Step 2 Visitor Screening    1. Name of Visitor (1 visitor per patient): martha    2. In the last month, have you been in contact with someone who was confirmed or suspected to have Coronavirus/COVID-19? No    3. Do you have the following symptoms?  Fever/Chills? No   Cough? No   Shortness of breath? No   Skin rash? No   Loss of taste or smell? No  Sore throat? No  Runny or stuffy nose? No  Muscle or body aches? No  Headaches? No  Fatigue? No  Vomiting or diarrhea? No

## 2020-11-05 NOTE — PROGRESS NOTES
Care Suites Post Procedure Note    Patient Information  Name: Adrian Petty  Age: 60 year old    Post Procedure  Time patient returned to Care Suites: 1324  Concerns/abnormal assessment: VSS. No pain. Right radial soft, CDI. dgtr here. Cath lab rn spoke with them.  If abnormal assessment, provider notified: N/A  Plan/Other: per orders    Leni Russo, RN

## 2020-11-05 NOTE — CODE/RAPID RESPONSE
[facility]    RRT Note  11/5/2020   Time Called: 1619    Code Status: No Order    I was called to evaluate Adrian Petty for acute chest pain, who is a 60 year old male who was admitted on 11/5/2020 for elective angiography. PMH includes uncontrolled diabetes.    Prior to the elective angiography, the patient has been experiencing dyspnea and chest pain on exertion, with uncontrolled hypertension prompting an elective angiogram with cardiology 11/5.  Post angiogram, patient reported chest pain 3/10 with dyspnea, and one episode of vomiting prompting RRT.    Assessment & Plan     Unstable angina 2/2 multivessel disease: On my exam, the patient is awake sitting upright alert, in no distress.  He endorses 3/10 substernal pain and heaviness, with associated dyspnea and nausea/vomiting.  Patient was hypertensive in the 180s/90s, heart rate 90s, no hypoxia and afebrile.  EKG was obtained showing no acute ischemic ST elevation or depression, normal sinus rhythm.  After 1 sublingual nitroglycerin, the patient's dyspnea had improved, but chest pain was remained at approximately 3/10.  A second nitroglycerin was administered which brought his chest pain down to 0-1/10.     Given his unstable angina, I discussed the case with Dr. Haider of interventional cardiology, who agreed the patient should be admitted for nitroglycerin infusion overnight.  CV surgery consult in the morning.    INTERVENTIONS:  -EKG  -Sublingual nitroglycerin x2  -Nitroglycerin infusion  -Zofran 4 mg IV x1  -Troponin, INR  -Portable CXR - no acute pulmonary abnormalities    I discussed the case with Dr. Herrera, hospitalist who is amenable to admitting the patient to CCU tonight.  I updated the patient, his daughter about the plan for admission, consult of CV surgery.      COLIN Medina [facility]  Hospitalist Service - [facility] [place]  Pager: [phone] (7a - 6p)      Physical Exam   Vital Signs with Ranges:  Temp:  [99  F (37.2   C)] 99  F (37.2  C)  Pulse:  [] 94  Resp:  [9-18] 9  BP: (129-187)/() 144/82  SpO2:  [94 %-99 %] 96 %  I/O last 3 completed shifts:  In: 480 [P.O.:480]  Out: -       Physical Exam  Vitals signs and nursing note reviewed.   Constitutional:       General: He is not in acute distress.     Appearance: Normal appearance.   HENT:      Head: Normocephalic and atraumatic.   Eyes:      Pupils: Pupils are equal, round, and reactive to light.   Cardiovascular:      Rate and Rhythm: Normal rate and regular rhythm.      Pulses: Normal pulses.      Heart sounds: Normal heart sounds. No murmur.   Pulmonary:      Effort: Pulmonary effort is normal. No respiratory distress.      Breath sounds: Normal breath sounds. No wheezing.   Abdominal:      General: Abdomen is flat. There is no distension.      Palpations: Abdomen is soft.      Tenderness: There is no abdominal tenderness.   Musculoskeletal: Normal range of motion.   Skin:     General: Skin is warm and dry.      Capillary Refill: Capillary refill takes less than 2 seconds.   Neurological:      General: No focal deficit present.      Mental Status: He is oriented to person, place, and time. Mental status is at baseline.   Psychiatric:         Mood and Affect: Mood normal.         Thought Content: Thought content normal.        Data     EKG:  Interpreted by COLIN Medina CNP  Time reviewed: 1611  Symptoms at time of EKG: chest pain 3/10   Rhythm: normal sinus   Rate: Normal  Axis: Normal  Ectopy: none  Conduction: normal  ST Segments/ T Waves: No ST-T wave changes, No acute ischemic changes and Non-specific ST-T wave changes  Q Waves: none  Comparison to prior: Unchanged    Clinical Impression: no acute changes and non-specific EKG    ABG:  -No lab results found in last 7 days.    Troponin:    No lab results found.    IMAGING: (X-ray/CT/MRI)   Recent Results (from the past 24 hour(s))   Echocardiogram Complete    Narrative     [id number]  [id number]  [id number]  [id number]^MYRON^AMELIE^DAMON           [facility]  Echocardiography Laboratory  [address] [place] [address]        Name: ADRIANA AGUILERA  MRN: [id number]  : 1960  Study Date: 2020 09:38 AM  Age: 60 yrs  Gender: Male  Patient Location: [facility]  Reason For Study: Coronary artery disease  Ordering Physician: AMELIE SANCHES  Referring Physician: AMELIE SANCHES  Performed By: Laurita Schwab     BSA: 2.2 m2  Height: 70 in  Weight: 230 lb  HR: 70  BP: 129/93 mmHg  _____________________________________________________________________________  __        Procedure  Complete Portable Echo Adult.  _____________________________________________________________________________  __        Interpretation Summary     1. Normal left ventricular size and systolic function. LVEF 60-65%.  2. No regional wall motion abnormalities.  3. Normal right ventricular size and systolic function.  4. No significant valve disease.     There is no comparison study available.  _____________________________________________________________________________  __        Left Ventricle  The left ventricle is normal in size. There is borderline concentric left  ventricular hypertrophy. Left ventricular systolic function is normal. The  visual ejection fraction is estimated at 60-65%. Grade I or early diastolic  dysfunction. No regional wall motion abnormalities noted.     Right Ventricle  The right ventricle is normal in size and function.     Atria  Normal left atrial size. Right atrial size is normal. There is no color  Doppler evidence of an atrial shunt.     Mitral Valve  The mitral valve leaflets appear normal. There is no evidence of stenosis,  fluttering, or prolapse. There is trace mitral regurgitation.        Tricuspid Valve  Normal tricuspid valve. There is trace tricuspid regurgitation. Right  ventricular systolic pressure could not be approximated due to  inadequate  tricuspid regurgitation.     Aortic Valve  The aortic valve is trileaflet with aortic valve sclerosis. No aortic  regurgitation is present. No aortic stenosis is present.     Pulmonic Valve  The pulmonic valve is not well seen, but is grossly normal. There is trace  pulmonic valvular regurgitation.     Vessels  The aortic root is normal size. Normal size ascending aorta. The inferior vena  cava cannot be assessed.     Pericardium  There is no pericardial effusion.        Rhythm  Sinus rhythm was noted.  _____________________________________________________________________________  __  MMode/2D Measurements & Calculations  IVSd: 1.5 cm     LVIDd: 4.9 cm  LVIDs: 3.7 cm  LVPWd: 1.2 cm  FS: 26.0 %  LV mass(C)d: 272.6 grams  LV mass(C)dI: 123.0 grams/m2  Ao root diam: 3.2 cm  LA dimension: 3.8 cm  asc Aorta Diam: 3.3 cm  LA/Ao: 1.2  LVOT diam: 2.3 cm  LVOT area: 4.2 cm2  LA Volume (BP): 50.3 ml  LA Volume Index (BP): 22.7 ml/m2  RWT: 0.50           Doppler Measurements & Calculations  MV E max mayte: 57.7 cm/sec  MV A max mayte: 90.7 cm/sec  MV E/A: 0.64  MV dec time: 0.26 sec  PA V2 max: 97.7 cm/sec  PA max PG: 3.8 mmHg  PA acc time: 0.12 sec  E/E' avg: 15.0  Lateral E/e': 11.7  Medial E/e': 18.3           _____________________________________________________________________________  __           Report approved by: Dr Asya Dial 11/05/2020 10:53 AM      Cardiac Catheterization    Narrative    Coronary angiography brief summary  Left main: Minimal disease  LAD: Diffuse mild disease throughout.  In-stent restenosis at mid LAD.    Distal LAD has severe disease.  D1 has a large territory with branching   into 2.  It is with severe disease.  LCx: Small territory.   at the proximal LCx.  Bridging collateral to   the distal LCx.  RCA: Dominant vessel.  Diffuse mild disease.  It gives rise to large PL   branch.  RPDA is .  Unclear with the bifurcation.  Collateralized from   LAD  apical.    Conclusions  -Three-vessel coronary artery disease with right PDA , more LCx ,   diagonal 1, and mid LAD ISR.  -Diabetes mellitus    Plans  -Surgical revascularization consultation  -We will start carvedilol 12.5 mg twice daily for hypertension and CAD             CBC with Diff:  Recent Labs   Lab Test 11/04/20  0808   WBC 6.1   HGB 16.6   MCV 85      INR 1.02     Comprehensive Metabolic Panel:  Recent Labs   Lab 11/05/20  0845 11/04/20  0808   NA  --  135   POTASSIUM  --  3.3*   CHLORIDE  --  100   CO2  --  30   ANIONGAP  --  5   GLC  --  232*   BUN  --  17   CR  --  0.88   GFRESTIMATED  --  >90   GFRESTBLACK  --  >90   NADEEN  --  9.1   PROTTOTAL 8.0  --    ALBUMIN 4.2  --    BILITOTAL 0.7  --    ALKPHOS 61  --    AST 19  --    ALT 36  --        INR:    Recent Labs   Lab Test 11/04/20  0808   INR 1.02       D-DIMER:  No components found for: DDIMER    UA:  Recent Labs   Lab 11/05/20  1500   COLOR Yellow   APPEARANCE Clear   URINEGLC >1000*   URINEBILI Negative   URINEKETONE 10*   SG 1.015   UBLD Negative   URINEPH 6.0   PROTEIN Negative   NITRITE Negative   LEUKEST Negative   RBCU <1   WBCU <1       Time Spent on this Encounter     I spent 49 minutes (1611 - 1700) of critical care time on the unit/floor managing the care of Adrian Petty. Upon evaluation, this patient had a high probability of imminent or life-threatening deterioration due to unstable angina which required my direct attention, intervention, and personal management. 100% of my time was spent at the bedside counseling the patient and/or coordinating care regarding services listed in this note.

## 2020-11-05 NOTE — PROGRESS NOTES
RRT called at 1620 after waiting 10 minutes for MD response to page.    Patient vomited and complaint of chest pain.  Stat EKG revealed sinus rhythm

## 2020-11-05 NOTE — PROGRESS NOTES
1735 pt transfering with flying crowe rn to [address] [place] [address]. VSS. Radial site soft, CDi with bandaid.

## 2020-11-05 NOTE — H&P
[facility]    History and Physical - Hospitalist Service       Date of Admission:  11/5/2020    Assessment & Plan   Adrian Petty is a 60 year old male with a history of CAD status post stenting at [facility] in [place] as recent as 2013, hypertension, hyperlipidemia, prostate cancer status post surgery, chemo, and radiation, and new diagnosis of type 2 diabetes who has been experiencing worsening exertional shortness of breath and chest discomfort.  Cardiology was following the patient and moved up his elective/outpatient angiogram to today.  Subsequent to the angiogram which showed multivessel disease, patient was being evaluated for CABG consideration with ultrasound of the legs and neck when he experienced sudden onset of shortness of breath, chest pain, nausea and vomiting.  He is subsequently admitted with nitroglycerin infusion ongoing to the CICU.  Work-up for possible coronary bypass continues.      CAD - multivessel disease  Prior stenting at [facility] - most recently at 2013  Ongoing progressive exertional SOB and chest pain. Angiogram today with MVD. Subsequently developed chest pain, N/V, and sob during US. RRT called, received SLN x2 with some improvement. EKG not acute. Trop negative.  - Cardiology consulted and following. Ongoing work up for pre-op CABG to continue.  - CT surgery consulted  - trend troponin  - telemetry   - nitroglycerin infusion continued  - PTA statin, amlodipine continued   - ASA ongoing    Hypertension  Hyperlipidemia  BP elevated earlier today, improved since.  -Nitroglycerin infusion as above  - PTA regimen (as above) to continue as appropriate-defer to cardiology    T2 Diabetes Mellitus  A1c 8.0% 11/4.   - sliding scale insulin for now  - Prior to discharge will need diabetic education and proper follow up    History of prostate cancer, s/p resection, chemo, and radiation  Noted. No change in management while in hospital for  now.    Asymptomatic COVID 19 screening  Felt to be low suspicion. No special precautions indicated.  - negative swab 11/4      Diet: Advance Diet as Tolerated: Regular Diet Adult    DVT Prophylaxis: Pneumatic Compression Devices  Kraft Catheter: not present  Code Status:  Full Code         Disposition Plan   Expected discharge: unclear, at least a few days likely if CABG is pursued   Entered: Jake Herrera MD 11/05/2020, 5:24 PM     The patient's care was discussed with the Patient and Patient's Family.    Jake Herrera MD  [facility]  Contact information available via [facility] Paging/Directory      ______________________________________________________________________    Chief Complaint   Worsening exertional shortness of breath chest pain    History is obtained from the patient    History of Present Illness   Adrian Petty is a 60 year old male with a history of CAD status post stenting at [facility] in [place] as recent as 2013, hypertension, hyperlipidemia, prostate cancer status post surgery, chemo, and radiation, and new diagnosis of type 2 diabetes who has been experiencing worsening exertional shortness of breath and chest discomfort.  Cardiology was following the patient and moved up his angiogram to today.  Multivessel disease was found on the angiogram with plans for CT surgery work-up and evaluation for consideration of bypass surgery.  Apparently he was getting ultrasound of the lower extremities when he began to have chest pain, nausea and he ended up vomiting.  RRT was called at 420 with stat EKG showing sinus rhythm and reportedly no overt acute ischemic changes.  Troponin has also resulted since that time and was negative.  Patient was given sublingual nitroglycerin which improved his dyspnea and a subsequent sublingual nitroglycerin decreased the chest discomfort to a 1 out of 10.  Cardiology is obviously aware of the patient and requests admission  for further management with CT surgery evaluation/consultation for possible CABG.  Patient was quite hypertensive earlier today but BP has improved.  Of note he has a new diagnosis of diabetes with A1c of 8.0%.     As I see him now in the CICU, his fiance is at bedside and he is on nitroglycerin drip with reasonable BP.  He denies any sob or chest pain.  He acknowledges the aforementioned history.     Review of Systems    The 10 point Review of Systems is negative other than noted in the HPI or here.     Past Medical History    I have reviewed this patient's medical history and updated it with pertinent information if needed.   Past Medical History:   Diagnosis Date     Arthritis      Cancer (H) 2010    prostate     Diabetes (H)      Heart disease 2012    stents     Hypertension        Past Surgical History   I have reviewed this patient's surgical history and updated it with pertinent information if needed.  Past Surgical History:   Procedure Laterality Date     BACK SURGERY       COLONOSCOPY       ORTHOPEDIC SURGERY         Social History   I have reviewed this patient's social history and updated it with pertinent information if needed.  Social History     Tobacco Use     Smoking status: Never Smoker     Smokeless tobacco: Former User   Substance Use Topics     Alcohol use: Yes     Comment: 1 beer per week     Drug use: None       Family History   I have reviewed this patient's family history and updated it with pertinent information if needed.  Family History   Problem Relation Age of Onset     Coronary Artery Disease Father        Prior to Admission Medications   Prior to Admission Medications   Prescriptions Last Dose Informant Patient Reported? Taking?   amLODIPine (NORVASC) 10 MG tablet 11/5/2020 at 0600  No Yes   Sig: Take 1 tablet (10 mg) by mouth daily   aspirin (ASA) 81 MG EC tablet 11/4/2020 at 0800  No Yes   Sig: Take 1 tablet (81 mg) by mouth daily   busPIRone (BUSPAR) 10 MG tablet 11/5/2020 at 0600   Yes Yes   Sig: Take 10 mg by mouth 3 times daily   losartan (COZAAR) 50 MG tablet 11/5/2020 at 0600  Yes Yes   Sig: Take 50 mg by mouth daily   nitroGLYcerin (NITROSTAT) 0.3 MG sublingual tablet Unknown at Unknown time  No No   Sig: For chest pain place 1 tablet under the tongue every 5 minutes for 3 doses. If symptoms persist 5 minutes after 1st dose call 911.   potassium chloride ER (KLOR-CON M) 20 MEQ CR tablet 11/5/2020 at 0600  Yes Yes   Sig: Take 20 mEq by mouth 2 times daily   rosuvastatin (CRESTOR) 40 MG tablet 11/5/2020 at 0600  No Yes   Sig: Take 1 tablet (40 mg) by mouth daily   sertraline (ZOLOFT) 50 MG tablet 11/4/2020 at 2200  Yes Yes   Sig: Take 50 mg by mouth daily   triamterene-HCTZ (DYAZIDE) 37.5-25 MG capsule 11/4/2020 at 0800  Yes Yes   Sig: Take by mouth every morning      Facility-Administered Medications: None     Allergies   No Known Allergies    Physical Exam   Vital Signs: Temp: 99  F (37.2  C)   BP: 139/79 Pulse: 93   Resp: 13 SpO2: 97 % O2 Device: None (Room air)    Weight: 0 lbs 0 oz    Gen: NAD, pleasant  HEENT: Normocephalic, EOMI, MMM  Resp: no crackles,  no wheezes, no increased work of resp  CV: S1S2 heard, reg rhythm, reg rate, no pitting pedal edema  Abdo: soft, nontender, nondistended, bowel sounds present  Ext: calves nontender, well perfused  Neuro: AAOx3, CN grossly intact, no facial asymmetry      Data   Data reviewed today: I reviewed all medications, new labs and imaging results over the last 24 hours. I personally reviewed no images or EKG's today.    Recent Labs   Lab 11/05/20  1656 11/05/20  0845 11/04/20  0808   WBC  --   --  6.1   HGB  --   --  16.6   MCV  --   --  85   PLT  --   --  212   INR 1.07  --  1.02   NA  --   --  135   POTASSIUM  --   --  3.3*   CHLORIDE  --   --  100   CO2  --   --  30   BUN  --   --  17   CR  --   --  0.88   ANIONGAP  --   --  5   NADEEN  --   --  9.1   GLC  --   --  232*   ALBUMIN  --  4.2  --    PROTTOTAL  --  8.0  --    BILITOTAL  --  0.7   --    ALKPHOS  --  61  --    ALT  --  36  --    AST  --  19  --    TROPI <0.015  --   --      Recent Results (from the past 24 hour(s))   Echocardiogram Complete    Narrative    [id number]  [id number]  [id number]  [id number]^MYRON^AMELIE^DAMON           [facility]  Echocardiography Laboratory  [address] [place] [address]        Name: ADRIANA AGUILERA  MRN: [id number]  : 1960  Study Date: 2020 09:38 AM  Age: 60 yrs  Gender: Male  Patient Location: [facility]  Reason For Study: Coronary artery disease  Ordering Physician: AMELIE SANCHES  Referring Physician: AMELIE SANCHES  Performed By: Laurita Schwab     BSA: 2.2 m2  Height: 70 in  Weight: 230 lb  HR: 70  BP: 129/93 mmHg  _____________________________________________________________________________  __        Procedure  Complete Portable Echo Adult.  _____________________________________________________________________________  __        Interpretation Summary     1. Normal left ventricular size and systolic function. LVEF 60-65%.  2. No regional wall motion abnormalities.  3. Normal right ventricular size and systolic function.  4. No significant valve disease.     There is no comparison study available.  _____________________________________________________________________________  __        Left Ventricle  The left ventricle is normal in size. There is borderline concentric left  ventricular hypertrophy. Left ventricular systolic function is normal. The  visual ejection fraction is estimated at 60-65%. Grade I or early diastolic  dysfunction. No regional wall motion abnormalities noted.     Right Ventricle  The right ventricle is normal in size and function.     Atria  Normal left atrial size. Right atrial size is normal. There is no color  Doppler evidence of an atrial shunt.     Mitral Valve  The mitral valve leaflets appear normal. There is no evidence of stenosis,  fluttering, or prolapse. There is trace mitral  regurgitation.        Tricuspid Valve  Normal tricuspid valve. There is trace tricuspid regurgitation. Right  ventricular systolic pressure could not be approximated due to inadequate  tricuspid regurgitation.     Aortic Valve  The aortic valve is trileaflet with aortic valve sclerosis. No aortic  regurgitation is present. No aortic stenosis is present.     Pulmonic Valve  The pulmonic valve is not well seen, but is grossly normal. There is trace  pulmonic valvular regurgitation.     Vessels  The aortic root is normal size. Normal size ascending aorta. The inferior vena  cava cannot be assessed.     Pericardium  There is no pericardial effusion.        Rhythm  Sinus rhythm was noted.  _____________________________________________________________________________  __  MMode/2D Measurements & Calculations  IVSd: 1.5 cm     LVIDd: 4.9 cm  LVIDs: 3.7 cm  LVPWd: 1.2 cm  FS: 26.0 %  LV mass(C)d: 272.6 grams  LV mass(C)dI: 123.0 grams/m2  Ao root diam: 3.2 cm  LA dimension: 3.8 cm  asc Aorta Diam: 3.3 cm  LA/Ao: 1.2  LVOT diam: 2.3 cm  LVOT area: 4.2 cm2  LA Volume (BP): 50.3 ml  LA Volume Index (BP): 22.7 ml/m2  RWT: 0.50           Doppler Measurements & Calculations  MV E max mayte: 57.7 cm/sec  MV A max mayte: 90.7 cm/sec  MV E/A: 0.64  MV dec time: 0.26 sec  PA V2 max: 97.7 cm/sec  PA max PG: 3.8 mmHg  PA acc time: 0.12 sec  E/E' avg: 15.0  Lateral E/e': 11.7  Medial E/e': 18.3           _____________________________________________________________________________  __           Report approved by: Dr Asya Dial 11/05/2020 10:53 AM      Cardiac Catheterization    Narrative    Coronary angiography brief summary  Left main: Minimal disease  LAD: Diffuse mild disease throughout.  In-stent restenosis at mid LAD.    Distal LAD has severe disease.  D1 has a large territory with branching   into 2.  It is with severe disease.  LCx: Small territory.    RCA: Dominant vessel.  Diffuse mild disease.  It gives rise to large PL    branch.  RPDA is .  Unclear with the bifurcation.  Collateralized from   LAD apical.    Conclusions  -Three-vessel coronary artery disease with right PDA , diagonal 1, and   mid LAD ISR.  -Diabetes mellitus    Plans  -Surgical revascularization consultation.  Patient ultimately developed   chest pain in recovery - will admit for urgent consultation and plan to   revascularize either via surgery or PCI prior to discharge         XR Chest Port 1 View    Narrative    CHEST ONE VIEW PORTABLE   11/5/2020 4:54 PM     HISTORY: Chest pain    COMPARISON: None.      Impression    IMPRESSION: Unremarkable single view of the chest.    TANIA DOMINGO MD

## 2020-11-06 ENCOUNTER — PREP FOR PROCEDURE (OUTPATIENT)
Dept: CARDIOLOGY | Facility: CLINIC | Age: 60
End: 2020-11-06

## 2020-11-06 ENCOUNTER — APPOINTMENT (OUTPATIENT)
Dept: GENERAL RADIOLOGY | Facility: CLINIC | Age: 60
End: 2020-11-06
Attending: SURGERY
Payer: COMMERCIAL

## 2020-11-06 ENCOUNTER — ANESTHESIA EVENT (OUTPATIENT)
Dept: SURGERY | Facility: CLINIC | Age: 60
End: 2020-11-06
Payer: COMMERCIAL

## 2020-11-06 DIAGNOSIS — I25.10 CAD (CORONARY ARTERY DISEASE): Primary | ICD-10-CM

## 2020-11-06 LAB
ANION GAP SERPL CALCULATED.3IONS-SCNC: 6 MMOL/L (ref 3–14)
APTT PPP: 26 SEC (ref 22–37)
BUN SERPL-MCNC: 17 MG/DL (ref 7–30)
CALCIUM SERPL-MCNC: 8.7 MG/DL (ref 8.5–10.1)
CHLORIDE SERPL-SCNC: 105 MMOL/L (ref 94–109)
CO2 SERPL-SCNC: 27 MMOL/L (ref 20–32)
CREAT SERPL-MCNC: 0.83 MG/DL (ref 0.66–1.25)
ERYTHROCYTE [DISTWIDTH] IN BLOOD BY AUTOMATED COUNT: 13.4 % (ref 10–15)
ERYTHROCYTE [DISTWIDTH] IN BLOOD BY AUTOMATED COUNT: 13.5 % (ref 10–15)
GFR SERPL CREATININE-BSD FRML MDRD: >90 ML/MIN/{1.73_M2}
GLUCOSE BLDC GLUCOMTR-MCNC: 231 MG/DL (ref 70–99)
GLUCOSE BLDC GLUCOMTR-MCNC: 242 MG/DL (ref 70–99)
GLUCOSE BLDC GLUCOMTR-MCNC: 248 MG/DL (ref 70–99)
GLUCOSE BLDC GLUCOMTR-MCNC: 262 MG/DL (ref 70–99)
GLUCOSE BLDC GLUCOMTR-MCNC: 276 MG/DL (ref 70–99)
GLUCOSE SERPL-MCNC: 230 MG/DL (ref 70–99)
HCT VFR BLD AUTO: 43.7 % (ref 40–53)
HCT VFR BLD AUTO: 44.3 % (ref 40–53)
HGB BLD-MCNC: 15 G/DL (ref 13.3–17.7)
HGB BLD-MCNC: 15.1 G/DL (ref 13.3–17.7)
MCH RBC QN AUTO: 29.4 PG (ref 26.5–33)
MCH RBC QN AUTO: 29.5 PG (ref 26.5–33)
MCHC RBC AUTO-ENTMCNC: 34.1 G/DL (ref 31.5–36.5)
MCHC RBC AUTO-ENTMCNC: 34.3 G/DL (ref 31.5–36.5)
MCV RBC AUTO: 86 FL (ref 78–100)
MCV RBC AUTO: 86 FL (ref 78–100)
PLATELET # BLD AUTO: 197 10E9/L (ref 150–450)
PLATELET # BLD AUTO: 204 10E9/L (ref 150–450)
POTASSIUM SERPL-SCNC: 3.1 MMOL/L (ref 3.4–5.3)
POTASSIUM SERPL-SCNC: 3.6 MMOL/L (ref 3.4–5.3)
RBC # BLD AUTO: 5.09 10E12/L (ref 4.4–5.9)
RBC # BLD AUTO: 5.13 10E12/L (ref 4.4–5.9)
SODIUM SERPL-SCNC: 138 MMOL/L (ref 133–144)
TROPONIN I SERPL-MCNC: 0.13 UG/L (ref 0–0.04)
UFH PPP CHRO-ACNC: 0.52 IU/ML
UFH PPP CHRO-ACNC: <0.1 IU/ML
WBC # BLD AUTO: 8.7 10E9/L (ref 4–11)
WBC # BLD AUTO: 9 10E9/L (ref 4–11)

## 2020-11-06 PROCEDURE — 84132 ASSAY OF SERUM POTASSIUM: CPT | Performed by: INTERNAL MEDICINE

## 2020-11-06 PROCEDURE — 36415 COLL VENOUS BLD VENIPUNCTURE: CPT | Performed by: INTERNAL MEDICINE

## 2020-11-06 PROCEDURE — 99221 1ST HOSP IP/OBS SF/LOW 40: CPT | Performed by: SURGERY

## 2020-11-06 PROCEDURE — 250N000011 HC RX IP 250 OP 636: Performed by: INTERNAL MEDICINE

## 2020-11-06 PROCEDURE — 99207 PR NO CHARGE LOS: CPT | Performed by: INTERNAL MEDICINE

## 2020-11-06 PROCEDURE — 84484 ASSAY OF TROPONIN QUANT: CPT | Performed by: NURSE PRACTITIONER

## 2020-11-06 PROCEDURE — 250N000013 HC RX MED GY IP 250 OP 250 PS 637: Performed by: INTERNAL MEDICINE

## 2020-11-06 PROCEDURE — 99221 1ST HOSP IP/OBS SF/LOW 40: CPT | Performed by: INTERNAL MEDICINE

## 2020-11-06 PROCEDURE — 999N001017 HC STATISTIC GLUCOSE BY METER IP

## 2020-11-06 PROCEDURE — 80048 BASIC METABOLIC PNL TOTAL CA: CPT | Performed by: HOSPITALIST

## 2020-11-06 PROCEDURE — 85520 HEPARIN ASSAY: CPT | Performed by: INTERNAL MEDICINE

## 2020-11-06 PROCEDURE — 85027 COMPLETE CBC AUTOMATED: CPT | Performed by: INTERNAL MEDICINE

## 2020-11-06 PROCEDURE — 85730 THROMBOPLASTIN TIME PARTIAL: CPT | Performed by: INTERNAL MEDICINE

## 2020-11-06 PROCEDURE — 99233 SBSQ HOSP IP/OBS HIGH 50: CPT | Performed by: INTERNAL MEDICINE

## 2020-11-06 PROCEDURE — 36415 COLL VENOUS BLD VENIPUNCTURE: CPT | Performed by: HOSPITALIST

## 2020-11-06 PROCEDURE — 36415 COLL VENOUS BLD VENIPUNCTURE: CPT | Performed by: NURSE PRACTITIONER

## 2020-11-06 PROCEDURE — 250N000013 HC RX MED GY IP 250 OP 250 PS 637: Performed by: HOSPITALIST

## 2020-11-06 PROCEDURE — 85027 COMPLETE CBC AUTOMATED: CPT | Performed by: HOSPITALIST

## 2020-11-06 PROCEDURE — 210N000002 HC R&B HEART CARE

## 2020-11-06 PROCEDURE — 71046 X-RAY EXAM CHEST 2 VIEWS: CPT

## 2020-11-06 RX ORDER — POTASSIUM CHLORIDE 1500 MG/1
40 TABLET, EXTENDED RELEASE ORAL ONCE
Status: COMPLETED | OUTPATIENT
Start: 2020-11-06 | End: 2020-11-06

## 2020-11-06 RX ORDER — METOPROLOL TARTRATE 25 MG/1
25 TABLET, FILM COATED ORAL 2 TIMES DAILY
Status: DISCONTINUED | OUTPATIENT
Start: 2020-11-06 | End: 2020-11-09

## 2020-11-06 RX ORDER — POTASSIUM CHLORIDE 1500 MG/1
20 TABLET, EXTENDED RELEASE ORAL ONCE
Status: COMPLETED | OUTPATIENT
Start: 2020-11-06 | End: 2020-11-06

## 2020-11-06 RX ORDER — HEPARIN SODIUM 10000 [USP'U]/100ML
0-5000 INJECTION, SOLUTION INTRAVENOUS CONTINUOUS
Status: DISCONTINUED | OUTPATIENT
Start: 2020-11-06 | End: 2020-11-09

## 2020-11-06 RX ORDER — OXYCODONE HYDROCHLORIDE 5 MG/1
5 TABLET ORAL EVERY 4 HOURS PRN
Status: DISCONTINUED | OUTPATIENT
Start: 2020-11-06 | End: 2020-11-09

## 2020-11-06 RX ADMIN — METOPROLOL TARTRATE 25 MG: 25 TABLET, FILM COATED ORAL at 21:50

## 2020-11-06 RX ADMIN — NITROGLYCERIN 90 MCG/MIN: 20 INJECTION INTRAVENOUS at 23:31

## 2020-11-06 RX ADMIN — HEPARIN SODIUM 1200 UNITS/HR: 10000 INJECTION, SOLUTION INTRAVENOUS at 02:02

## 2020-11-06 RX ADMIN — METOPROLOL TARTRATE 25 MG: 25 TABLET, FILM COATED ORAL at 09:43

## 2020-11-06 RX ADMIN — BUSPIRONE HYDROCHLORIDE 10 MG: 10 TABLET ORAL at 09:43

## 2020-11-06 RX ADMIN — ASPIRIN 81 MG: 81 TABLET ORAL at 09:43

## 2020-11-06 RX ADMIN — DOCUSATE SODIUM 50 MG AND SENNOSIDES 8.6 MG 1 TABLET: 8.6; 5 TABLET, FILM COATED ORAL at 11:12

## 2020-11-06 RX ADMIN — INSULIN ASPART 1 UNITS: 100 INJECTION, SOLUTION INTRAVENOUS; SUBCUTANEOUS at 21:50

## 2020-11-06 RX ADMIN — BUSPIRONE HYDROCHLORIDE 10 MG: 10 TABLET ORAL at 21:50

## 2020-11-06 RX ADMIN — ROSUVASTATIN CALCIUM 40 MG: 20 TABLET, FILM COATED ORAL at 09:44

## 2020-11-06 RX ADMIN — POTASSIUM CHLORIDE 40 MEQ: 1500 TABLET, EXTENDED RELEASE ORAL at 09:46

## 2020-11-06 RX ADMIN — MICONAZOLE NITRATE: 20 POWDER TOPICAL at 19:11

## 2020-11-06 RX ADMIN — SERTRALINE HYDROCHLORIDE 50 MG: 50 TABLET ORAL at 09:43

## 2020-11-06 RX ADMIN — HEPARIN SODIUM 1300 UNITS/HR: 10000 INJECTION, SOLUTION INTRAVENOUS at 18:13

## 2020-11-06 RX ADMIN — ACETAMINOPHEN 650 MG: 325 TABLET, FILM COATED ORAL at 02:03

## 2020-11-06 RX ADMIN — BUSPIRONE HYDROCHLORIDE 10 MG: 10 TABLET ORAL at 17:00

## 2020-11-06 RX ADMIN — POTASSIUM CHLORIDE 20 MEQ: 1500 TABLET, EXTENDED RELEASE ORAL at 17:00

## 2020-11-06 RX ADMIN — OXYCODONE HYDROCHLORIDE 5 MG: 5 TABLET ORAL at 19:09

## 2020-11-06 RX ADMIN — ACETAMINOPHEN 650 MG: 325 TABLET, FILM COATED ORAL at 11:12

## 2020-11-06 RX ADMIN — AMLODIPINE BESYLATE 10 MG: 10 TABLET ORAL at 09:43

## 2020-11-06 ASSESSMENT — ACTIVITIES OF DAILY LIVING (ADL)
WEAR_GLASSES_OR_BLIND: YES
DIFFICULTY_COMMUNICATING: NO
FALL_HISTORY_WITHIN_LAST_SIX_MONTHS: NO
TOILETING_ISSUES: NO
HEARING_DIFFICULTY_OR_DEAF: NO
DIFFICULTY_EATING/SWALLOWING: NO
DOING_ERRANDS_INDEPENDENTLY_DIFFICULTY: NO
DRESSING/BATHING_DIFFICULTY: NO
CONCENTRATING,_REMEMBERING_OR_MAKING_DECISIONS_DIFFICULTY: NO
VISION_MANAGEMENT: GLASSES
WALKING_OR_CLIMBING_STAIRS_DIFFICULTY: NO

## 2020-11-06 NOTE — CONSULTS
[facility]    Cardiology Consultation     Date of Admission:  11/5/2020    Assessment & Plan   Adrian Petty is a 60 year old male who was admitted on 11/5/2020.    Severe three-vessel coronary disease with non-ST elevation myocardial infarction  Uncontrolled hypertension  Overweight  Diabetes  Hyperlipidemia  Prior history of prostate cancer    Plan and recommendations  1. At this point the patient is chest pain-free.  He remains on nitroglycerin infusion along with heparin.  2. Continue nitro and heparin at this time.  Increase nitroglycerin to keep systolics around 120-130.  Currently when I was in the room he was sitting at 170 systolic.  3. I have started 25 twice daily metoprolol.  Continue aspirin statin.  Continue other home antihypertensives.  4. Awaiting CV surgery decision on timing of bypass.  5. Cardiology will follow along in the interim.  Bedrest advised.  Please call us with any clinical changes.    Andrea Mendes MD    Primary Care Physician   Physician No Ref-Primary    Reason for Consult   Reason for consult: I was asked by medicine team to evaluate this patient for severe three-vessel coronary disease.    History of Present Illness   Adrian Petty is a 60 year old male who presents with unstable angina.  He had seen [facility] heart cardiology recently and was advised to get a coronary angiography.  That had to be moved up earlier due to ongoing symptoms.  Coronary angiography yesterday revealed three-vessel coronary disease.  He is diabetic has uncontrolled hypertension and hyperlipidemia.  He has a prior history of stenting many years ago.  Given his ongoing symptoms, he was advised an inpatient admission yesterday by Dr. Haider who had recently seen him in clinic.  On arrival he has been started on intravenous heparin and nitroglycerin.  ECG without active changes and the patient has been chest pain-free.  CV surgery has been consulted and cardiology is been consulted  for assistance in management.    Patient Active Problem List   Diagnosis     Coronary artery disease involving native coronary artery of native heart with unstable angina pectoris (H)       Past Medical History   I have reviewed this patient's medical history and updated it with pertinent information if needed.   Past Medical History:   Diagnosis Date     Arthritis      Cancer (H) 2010    prostate     Diabetes (H)      Heart disease 2012    stents     Hypertension        Past Surgical History   I have reviewed this patient's surgical history and updated it with pertinent information if needed.  Past Surgical History:   Procedure Laterality Date     BACK SURGERY       COLONOSCOPY       CV HEART CATHETERIZATION WITH POSSIBLE INTERVENTION N/A 11/5/2020    Procedure: Heart Catheterization with Possible Intervention with MYRON - Schedule at 10:30 AM on Thursday 11/5;  Surgeon: Ted Haider MD;  Location:  HEART CARDIAC CATH LAB     ORTHOPEDIC SURGERY         Prior to Admission Medications   Prior to Admission Medications   Prescriptions Last Dose Informant Patient Reported? Taking?   amLODIPine (NORVASC) 10 MG tablet 11/5/2020 at 0600  No Yes   Sig: Take 1 tablet (10 mg) by mouth daily   aspirin (ASA) 81 MG EC tablet 11/4/2020 at 0800  No Yes   Sig: Take 1 tablet (81 mg) by mouth daily   busPIRone (BUSPAR) 10 MG tablet 11/5/2020 at 0600  Yes Yes   Sig: Take 10 mg by mouth 3 times daily   losartan (COZAAR) 50 MG tablet 11/5/2020 at 0600  Yes Yes   Sig: Take 50 mg by mouth daily   nitroGLYcerin (NITROSTAT) 0.3 MG sublingual tablet Unknown at Unknown time  No No   Sig: For chest pain place 1 tablet under the tongue every 5 minutes for 3 doses. If symptoms persist 5 minutes after 1st dose call 911.   potassium chloride ER (KLOR-CON M) 20 MEQ CR tablet 11/5/2020 at 0600  Yes Yes   Sig: Take 20 mEq by mouth 2 times daily   rosuvastatin (CRESTOR) 40 MG tablet 11/5/2020 at 0600  No Yes   Sig: Take 1 tablet (40 mg)  by mouth daily   sertraline (ZOLOFT) 50 MG tablet 11/4/2020 at 2200  Yes Yes   Sig: Take 50 mg by mouth daily   triamterene-HCTZ (DYAZIDE) 37.5-25 MG capsule 11/4/2020 at 0800  Yes Yes   Sig: Take by mouth every morning      Facility-Administered Medications: None     Current Facility-Administered Medications   Medication Dose Route Frequency     amLODIPine  10 mg Oral Daily     aspirin  81 mg Oral Daily     busPIRone  10 mg Oral TID     insulin aspart  1-3 Units Subcutaneous TID AC     insulin aspart  1-3 Units Subcutaneous At Bedtime     metoprolol tartrate  25 mg Oral BID     polyethylene glycol  17 g Oral Daily     potassium chloride  40 mEq Oral Once     rosuvastatin  40 mg Oral Daily     senna-docusate  1 tablet Oral BID    Or     senna-docusate  2 tablet Oral BID     sertraline  50 mg Oral Daily     sodium chloride (PF)  3 mL Intracatheter Q8H     Current Facility-Administered Medications   Medication Last Rate     heparin 1,200 Units/hr (11/06/20 0813)     nitroGLYcerin 10 mcg/min (11/06/20 0812)     sodium chloride 75 mL/hr at 11/05/20 1820     Allergies   No Known Allergies    Social History    reports that he has never smoked. He has quit using smokeless tobacco. He reports current alcohol use.    Family History   Family History   Problem Relation Age of Onset     Coronary Artery Disease Father        Review of Systems   The comprehensive 10 point Review of Systems is negative other than noted in the HPI or here.     Physical Exam   Vital Signs with Ranges  Temp:  [97.9  F (36.6  C)-98.1  F (36.7  C)] 98.1  F (36.7  C)  Pulse:  [] 93  Resp:  [9-98] 18  BP: (125-187)/() 168/89  SpO2:  [94 %-98 %] 97 %  Wt Readings from Last 4 Encounters:   11/06/20 101.4 kg (223 lb 9.6 oz)   11/03/20 104.3 kg (230 lb)     I/O last 3 completed shifts:  In: 480 [P.O.:480]  Out: 525 [Urine:475; Emesis/NG output:50]      Vitals: BP (!) 168/89 (BP Location: Left arm)   Pulse 93   Temp 98.1  F (36.7  C) (Oral)    Resp 18   Wt 101.4 kg (223 lb 9.6 oz)   SpO2 97%   BMI 32.08 kg/m      General alert oriented x3 no acute distress.  Neck is supple JVP is normal.  Lungs are clear to auscultation bilaterally.  Extremities are warm without edema.  Psych appropriate affect.  Abdomen nondistended nontender.  Cardiac sounds are regular without murmur rubs or gallops.    Recent Labs   Lab 11/06/20 0043 11/05/20 2059 11/05/20 1656   TROPI 0.127* 0.043 <0.015       Recent Labs   Lab 11/06/20  0549 11/06/20  0154 11/06/20 0043 11/05/20 2059 11/05/20 1656 11/05/20  0845 11/04/20  0808   WBC 9.0 8.7  --   --   --   --  6.1   HGB 15.1 15.0  --   --   --   --  16.6   MCV 86 86  --   --   --   --  85    197  --   --   --   --  212   INR  --   --   --   --  1.07  --  1.02     --   --   --   --   --  135   POTASSIUM 3.1*  --   --   --   --   --  3.3*   CHLORIDE 105  --   --   --   --   --  100   CO2 27  --   --   --   --   --  30   BUN 17  --   --   --   --   --  17   CR 0.83  --   --   --   --   --  0.88   GFRESTIMATED >90  --   --   --   --   --  >90   GFRESTBLACK >90  --   --   --   --   --  >90   ANIONGAP 6  --   --   --   --   --  5   NADEEN 8.7  --   --   --   --   --  9.1   *  --   --   --   --   --  232*   ALBUMIN  --   --   --   --   --  4.2  --    PROTTOTAL  --   --   --   --   --  8.0  --    BILITOTAL  --   --   --   --   --  0.7  --    ALKPHOS  --   --   --   --   --  61  --    ALT  --   --   --   --   --  36  --    AST  --   --   --   --   --  19  --    TROPI  --   --  0.127* 0.043 <0.015  --   --      Recent Labs   Lab Test 11/04/20  0808   CHOL 239*   HDL 36*   LDL Cannot estimate LDL when triglyceride exceeds 400 mg/dL   TRIG 459*     Recent Labs   Lab 11/06/20  0549 11/06/20  0154 11/04/20  0808   WBC 9.0 8.7 6.1   HGB 15.1 15.0 16.6   HCT 44.3 43.7 48.9   MCV 86 86 85    197 212     No results for input(s): PH, PHV, PO2, PO2V, SAT, PCO2, PCO2V, HCO3, HCO3V in the last 168 hours.  No results for  input(s): NTBNPI, NTBNP in the last 168 hours.  No results for input(s): DD in the last 168 hours.  No results for input(s): SED, CRP in the last 168 hours.  Recent Labs   Lab 20  0549 20  0154 20  0808    197 212     Recent Labs   Lab 20  0808   TSH 0.84     Recent Labs   Lab 20  1500   COLOR Yellow   APPEARANCE Clear   URINEGLC >1000*   URINEBILI Negative   URINEKETONE 10*   SG 1.015   UBLD Negative   URINEPH 6.0   PROTEIN Negative   NITRITE Negative   LEUKEST Negative   RBCU <1   WBCU <1       Imaging:  Recent Results (from the past 48 hour(s))   Echocardiogram Complete    Narrative    [id number]  [id number]  [id number]  [id number]^MYRON^AMELIE^DAMON           [facility]  Echocardiography Laboratory  [address] [place] [address]        Name: ADRIANA AGUILERA  MRN: [id number]  : 1960  Study Date: 2020 09:38 AM  Age: 60 yrs  Gender: Male  Patient Location: [facility]  Reason For Study: Coronary artery disease  Ordering Physician: AMELIE SANCHES  Referring Physician: AMELIE SANCHES  Performed By: Laurita Schwab     BSA: 2.2 m2  Height: 70 in  Weight: 230 lb  HR: 70  BP: 129/93 mmHg  _____________________________________________________________________________  __        Procedure  Complete Portable Echo Adult.  _____________________________________________________________________________  __        Interpretation Summary     1. Normal left ventricular size and systolic function. LVEF 60-65%.  2. No regional wall motion abnormalities.  3. Normal right ventricular size and systolic function.  4. No significant valve disease.     There is no comparison study available.  _____________________________________________________________________________  __        Left Ventricle  The left ventricle is normal in size. There is borderline concentric left  ventricular hypertrophy. Left ventricular systolic function is normal. The  visual ejection  fraction is estimated at 60-65%. Grade I or early diastolic  dysfunction. No regional wall motion abnormalities noted.     Right Ventricle  The right ventricle is normal in size and function.     Atria  Normal left atrial size. Right atrial size is normal. There is no color  Doppler evidence of an atrial shunt.     Mitral Valve  The mitral valve leaflets appear normal. There is no evidence of stenosis,  fluttering, or prolapse. There is trace mitral regurgitation.        Tricuspid Valve  Normal tricuspid valve. There is trace tricuspid regurgitation. Right  ventricular systolic pressure could not be approximated due to inadequate  tricuspid regurgitation.     Aortic Valve  The aortic valve is trileaflet with aortic valve sclerosis. No aortic  regurgitation is present. No aortic stenosis is present.     Pulmonic Valve  The pulmonic valve is not well seen, but is grossly normal. There is trace  pulmonic valvular regurgitation.     Vessels  The aortic root is normal size. Normal size ascending aorta. The inferior vena  cava cannot be assessed.     Pericardium  There is no pericardial effusion.        Rhythm  Sinus rhythm was noted.  _____________________________________________________________________________  __  MMode/2D Measurements & Calculations  IVSd: 1.5 cm     LVIDd: 4.9 cm  LVIDs: 3.7 cm  LVPWd: 1.2 cm  FS: 26.0 %  LV mass(C)d: 272.6 grams  LV mass(C)dI: 123.0 grams/m2  Ao root diam: 3.2 cm  LA dimension: 3.8 cm  asc Aorta Diam: 3.3 cm  LA/Ao: 1.2  LVOT diam: 2.3 cm  LVOT area: 4.2 cm2  LA Volume (BP): 50.3 ml  LA Volume Index (BP): 22.7 ml/m2  RWT: 0.50           Doppler Measurements & Calculations  MV E max mayte: 57.7 cm/sec  MV A max mayte: 90.7 cm/sec  MV E/A: 0.64  MV dec time: 0.26 sec  PA V2 max: 97.7 cm/sec  PA max PG: 3.8 mmHg  PA acc time: 0.12 sec  E/E' avg: 15.0  Lateral E/e': 11.7  Medial E/e': 18.3           _____________________________________________________________________________  __            Report approved by: Dr Asya Dial 11/05/2020 10:53 AM      Cardiac Catheterization    Narrative    Coronary angiography brief summary  Left main: Minimal disease  LAD: Diffuse mild disease throughout.  In-stent restenosis at mid LAD.    Distal LAD has severe disease.  D1 has a large territory with branching   into 2.  It is with severe disease.  LCx: Small territory.    RCA: Dominant vessel.  Diffuse mild disease.  It gives rise to large PL   branch.  RPDA is .  Unclear with the bifurcation.  Collateralized from   LAD apical.    Conclusions  -Three-vessel coronary artery disease with right PDA , diagonal 1, and   mid LAD ISR.  -Diabetes mellitus    Plans  -Surgical revascularization consultation.  Patient ultimately developed   chest pain in recovery - will admit for urgent consultation and plan to   revascularize either via surgery or PCI prior to discharge         US Carotid Bilateral    Narrative    BILATERAL CAROTID ULTRASOUND   11/5/2020 4:18 PM     HISTORY:  Preoperative coronary artery bypass graft.      COMPARISON: None.     RIGHT CAROTID FINDINGS: There is calcified and noncalcified  atherosclerotic plaque in the carotid bifurcation.   Right ICA PSV: 140 cm/sec.   Right ICA EDV: 33 cm/sec.   Right ICA/CCA PSV Ratio: 1.4   These indicate 50 - 69% diameter stenosis of the right ICA.   Right Vertebral: Antegrade flow.    Right ECA: Antegrade flow.     LEFT CAROTID FINDINGS: There is calcified and noncalcified  atherosclerotic plaque in the carotid bifurcation.   Left ICA PSV: 116 cm/sec.  Left ICA EDV: 33 cm/sec.  Left ICA/CCA PSV Ratio: 0.9    These indicate Less than 50% diameter stenosis of the left ICA.    Left Vertebral: Antegrade flow.    Left ECA: Antegrade flow.     Causes of Decreased Accuracy: None.       Impression    IMPRESSION:    1. 50-69% diameter stenosis of the right ICA relative to the distal  ICA diameter.  2. Less than 50% diameter stenosis of the left ICA relative to  the  distal ICA diameter.    PRINCESS BURNS MD   US Lower Extremity Venous Mapping Bilateral    Narrative    ULTRASOUND VENOUS LEFT LOWER EXTREMITY    11/5/2020 4:18 PM     HISTORY: Coronary artery disease, presurgical evaluation prior to CABG    COMPARISON: None.    TECHNIQUE: Ultrasound gray scale and Color Doppler flow performed.    FINDINGS:   The left greater saphenous vein is grossly patent throughout its  course with measurements ranging between 2.3 mm and 4.5 mm in  diameter. Detailed measurements as seen on technologist worksheet.      Impression    IMPRESSION:   The left greater saphenous vein is patent throughout its course.  Measurements as noted on the technologist worksheet.    MICHEL DIEZ MD   XR Chest Port 1 View    Narrative    CHEST ONE VIEW PORTABLE   11/5/2020 4:54 PM     HISTORY: Chest pain    COMPARISON: None.      Impression    IMPRESSION: Unremarkable single view of the chest.    TANIA DOMINGO MD       Echo:  No results found for this or any previous visit (from the past 4320 hour(s)).

## 2020-11-06 NOTE — PLAN OF CARE
VSS, A/O, on RA, denies CP throughout shift, nitro gtt running through PIV, heparin started d/t troponin levels, , 276, tele NSR, voiding adequately, R TR band site WDL, NPO at midnight pending potential CV surgery on 11/6, up SBA d/t IVs and monitor, continue to monitor

## 2020-11-06 NOTE — PROVIDER NOTIFICATION
Paged Dr. Canchola 11/06/20 16:50 PM, replaced 17:25 PM regarding persistent HA on nitroglycerin gtt not improved with tylenol. Requesting further pain management. Orders received: pending.

## 2020-11-06 NOTE — PROGRESS NOTES
XCoverage: paged by RN because the patient is requesting his PTA dose of Melatonin 10 mg at bedtime- ordered.    Aranza Coulter MD

## 2020-11-06 NOTE — PROGRESS NOTES
Hospitalist Cross Cover  11/6/2020    Notified of troponin elevation at 0.127. Chart reviewed. Patient to go for CABG tomorrow morning.     BP (!) 151/87   Pulse 94   Temp 98.1  F (36.7  C) (Oral)   Resp 18   SpO2 97%     Plan: Initiate therapeutic heparin now and continue to check troponin trend. Heparin can be held per surgery in AM.     Jane Rodríguez MD

## 2020-11-06 NOTE — PROVIDER NOTIFICATION
Paged Dr. Canchola 11/06/20 8:28 AM regarding Potassium 3.1, need replacement? Orders received: high potassium replacement protocol.

## 2020-11-06 NOTE — PROGRESS NOTES
[facility]    Hospitalist Progress Note    Assessment & Plan   Adrian Petty is a 60 year old male with a history of CAD status post stenting at [facility] in [place] as recent as 2013, hypertension, hyperlipidemia, prostate cancer status post surgery, chemo, and radiation, and new diagnosis of type 2 diabetes who has been experiencing worsening exertional shortness of breath and chest discomfort.  Cardiology was following the patient and moved up his elective/outpatient angiogram 11/5.  Subsequent to the angiogram which showed multivessel disease, patient was being evaluated for CABG consideration with ultrasound of the legs and neck when he experienced sudden onset of shortness of breath, chest pain, nausea and vomiting.  He is subsequently admitted with nitroglycerin infusion ongoing to the CICU.  CAD - multivessel disease  Prior stenting at [facility] - most recently at 2013  Ongoing progressive exertional SOB and chest pain. Angiogram today with MVD. Subsequently developed chest pain, N/V, and sob during US. RRT called, received SLN x2 with some improvement. EKG not acute. Trop negative.  -Patient seen by cardiology and CV surgery, date for surgery not yet decided, for now continue heparin and nitro drips.  -Continue telemetry  - PTA statin, amlodipine continued   - ASA to continue.     Hypertension  Hyperlipidemia  BP elevated earlier today, improved since.  -Nitroglycerin infusion as above  - PTA regimen (as above) to continue, cardiology made adjustments to his antihypertensives.  Blood pressures are better controlled now.     T2 Diabetes Mellitus  A1c 8.0% 11/4.   - sliding scale insulin for now  - Prior to discharge will need diabetic education and proper follow up     History of prostate cancer, s/p resection, chemo, and radiation  Noted. No change in management while in hospital for now.     Asymptomatic COVID 19 screening  Felt to be low suspicion. No special  precautions indicated.  - negative swab 11/4  DVT Prophylaxis: On heparin drip  Code Status: Full Code     Disposition: Expected discharge depending on date of his surgery.    Lily Canchola MD  Text Page   (7am to 6pm)    Interval History   Overnight events noted, he had some chest tightness earlier in the morning, he was able to ambulate without any chest pain, denies any nausea, vomiting or diaphoresis.  Afebrile, he is n.p.o. awaiting to hear from CV surgery about plan for surgery.  Mentions that he is hungry.    -Data reviewed today: I reviewed all new labs and imaging results over the last 24 hours.     Physical Exam   Temp: 98.4  F (36.9  C) Temp src: Oral BP: 127/73 Pulse: 89   Resp: 17 SpO2: 96 % O2 Device: None (Room air)    Vitals:    11/06/20 0223   Weight: 101.4 kg (223 lb 9.6 oz)     Vital Signs with Ranges  Temp:  [97.9  F (36.6  C)-98.4  F (36.9  C)] 98.4  F (36.9  C)  Pulse:  [] 89  Resp:  [9-98] 17  BP: (118-187)/() 127/73  SpO2:  [94 %-98 %] 96 %  I/O last 3 completed shifts:  In: -   Out: 800 [Urine:750; Emesis/NG output:50]    Constitutional: Awake, alert, cooperative, no apparent distress  Respiratory: Clear to auscultation bilaterally, no crackles or wheezing  Cardiovascular: Regular rate and rhythm, normal S1 and S2, and no murmur noted  GI: Normal bowel sounds, soft, non-distended, non-tender  Skin/Integumen: No rashes, no cyanosis, no edema  Neuro : moving all 4 extremities, no focal deficit noted     Medications     heparin 1,000 Units/hr (11/06/20 1035)     nitroGLYcerin 50 mcg/min (11/06/20 1307)     sodium chloride 75 mL/hr at 11/05/20 1820       amLODIPine  10 mg Oral Daily     aspirin  81 mg Oral Daily     busPIRone  10 mg Oral TID     insulin aspart  1-3 Units Subcutaneous TID AC     insulin aspart  1-3 Units Subcutaneous At Bedtime     metoprolol tartrate  25 mg Oral BID     polyethylene glycol  17 g Oral Daily     rosuvastatin  40 mg Oral Daily     senna-docusate  1  tablet Oral BID    Or     senna-docusate  2 tablet Oral BID     sertraline  50 mg Oral Daily     sodium chloride (PF)  3 mL Intracatheter Q8H       Data   Recent Labs   Lab 11/06/20  1423 11/06/20  0549 11/06/20  0154 11/06/20  0043 11/05/20 2059 11/05/20  1656 11/05/20  0845 11/04/20  0808   WBC  --  9.0 8.7  --   --   --   --  6.1   HGB  --  15.1 15.0  --   --   --   --  16.6   MCV  --  86 86  --   --   --   --  85   PLT  --  204 197  --   --   --   --  212   INR  --   --   --   --   --  1.07  --  1.02   NA  --  138  --   --   --   --   --  135   POTASSIUM 3.6 3.1*  --   --   --   --   --  3.3*   CHLORIDE  --  105  --   --   --   --   --  100   CO2  --  27  --   --   --   --   --  30   BUN  --  17  --   --   --   --   --  17   CR  --  0.83  --   --   --   --   --  0.88   ANIONGAP  --  6  --   --   --   --   --  5   NADEEN  --  8.7  --   --   --   --   --  9.1   GLC  --  230*  --   --   --   --   --  232*   ALBUMIN  --   --   --   --   --   --  4.2  --    PROTTOTAL  --   --   --   --   --   --  8.0  --    BILITOTAL  --   --   --   --   --   --  0.7  --    ALKPHOS  --   --   --   --   --   --  61  --    ALT  --   --   --   --   --   --  36  --    AST  --   --   --   --   --   --  19  --    TROPI  --   --   --  0.127* 0.043 <0.015  --   --      Recent Labs   Lab 11/06/20  1257 11/06/20  0843 11/06/20  0549 11/06/20  0154 11/05/20  2134 11/04/20  0808   GLC  --   --  230*  --   --  232*   * 248*  --  276* 340*  --        Imaging:   Recent Results (from the past 24 hour(s))   US Carotid Bilateral    Narrative    BILATERAL CAROTID ULTRASOUND   11/5/2020 4:18 PM     HISTORY:  Preoperative coronary artery bypass graft.      COMPARISON: None.     RIGHT CAROTID FINDINGS: There is calcified and noncalcified  atherosclerotic plaque in the carotid bifurcation.   Right ICA PSV: 140 cm/sec.   Right ICA EDV: 33 cm/sec.   Right ICA/CCA PSV Ratio: 1.4   These indicate 50 - 69% diameter stenosis of the right ICA.   Right  Vertebral: Antegrade flow.    Right ECA: Antegrade flow.     LEFT CAROTID FINDINGS: There is calcified and noncalcified  atherosclerotic plaque in the carotid bifurcation.   Left ICA PSV: 116 cm/sec.  Left ICA EDV: 33 cm/sec.  Left ICA/CCA PSV Ratio: 0.9    These indicate Less than 50% diameter stenosis of the left ICA.    Left Vertebral: Antegrade flow.    Left ECA: Antegrade flow.     Causes of Decreased Accuracy: None.       Impression    IMPRESSION:    1. 50-69% diameter stenosis of the right ICA relative to the distal  ICA diameter.  2. Less than 50% diameter stenosis of the left ICA relative to the  distal ICA diameter.    PRINCESS BURNS MD   US Lower Extremity Venous Mapping Bilateral    Narrative    ULTRASOUND VENOUS LEFT LOWER EXTREMITY    11/5/2020 4:18 PM     HISTORY: Coronary artery disease, presurgical evaluation prior to CABG    COMPARISON: None.    TECHNIQUE: Ultrasound gray scale and Color Doppler flow performed.    FINDINGS:   The left greater saphenous vein is grossly patent throughout its  course with measurements ranging between 2.3 mm and 4.5 mm in  diameter. Detailed measurements as seen on technologist worksheet.      Impression    IMPRESSION:   The left greater saphenous vein is patent throughout its course.  Measurements as noted on the technologist worksheet.    MICHEL DIEZ MD   XR Chest Port 1 View    Narrative    CHEST ONE VIEW PORTABLE   11/5/2020 4:54 PM     HISTORY: Chest pain    COMPARISON: None.      Impression    IMPRESSION: Unremarkable single view of the chest.    TANIA DOMINGO MD   XR Chest 2 Views    Narrative    CHEST TWO VIEW   11/6/2020 8:26 AM     HISTORY: Pre op CABG.    COMPARISON: Chest x-ray 11/5/2020.      Impression    IMPRESSION: PA and lateral views of the chest. Lungs are clear. Heart  is normal in size. No effusions are evident. No pneumothorax.    NITA CHILDERS MD

## 2020-11-06 NOTE — PROGRESS NOTES
MD Notification    Notified Person: MD    Notified Person Name: ANDRES Rodríguez    Notification Date/Time: November 6, 2020, 1:26 AM    Notification Interaction: pgd    Purpose of Notification: 268 (BS)   Critical trop 0.127, pt not on heparin, waiting for CV surgery tomorrow for open heart    Orders Received: heparin orders placed by MD    Comments:

## 2020-11-06 NOTE — PROGRESS NOTES
MD Notification    Notified Person: MD    Notified Person Name: Yfn    Notification Date/Time: November 5, 2020, 8:16 PM    Notification Interaction: pgd    Purpose of Notification:  (BS)   Patient requesting 10mg of melatonin, pt reports that this is what he normally takes every night. Only 1mg ordered.     Orders Received: dose modified by md    Comments:

## 2020-11-06 NOTE — CONSULTS
Cardiovascular and Thoracic Surgery Consultation      Adrian Petty MRN# [id number]   YOB: 1960 Age: 60 year old   Date of Admission: 11/5/2020     Reason for consult: Severe 3 vessel coronary artery disease with NSTEMI           Assessment and Plan:   Continue heparin and nitroglycerin gtt  Will review with surgeon.  Timing of surgery to be determined.  No plans for surgery on 11/6/20. Ok to eat.  Reviewed plans for cabg.  Reviewed risks/benefits of surgery.  All questions/concerns addressed with pt/fiance.              Chief Complaint:   Worsening exertional short of breath and chest discomfort         History of Present Illness:   This patient is a 60 year old male who presents with a history of CAD status post stenting at [facility] in [place] as recent as 2013, hypertension, hyperlipidemia, prostate cancer status post surgery, chemo, and radiation, and new diagnosis of type 2 diabetes who has been experiencing worsening exertional shortness of breath and chest discomfort. Cardiology was following the patient and moved up his elective/outpatient angiogram. Subsequent to the angiogram which showed multivessel disease, patient was being evaluated for CABG consideration with ultrasound of the legs and neck when he experienced sudden onset of shortness of breath, chest pain, nausea and vomiting. He is subsequently admitted with nitroglycerin infusion ongoing to the CICU. Pt did experience chest pain this am when his nitroglycerin was titrated up to bring down his BP. When BP was brought down chest pain subsided. Pt denies any swelling in his lower extremities.              Past Medical History:     Past Medical History:   Diagnosis Date     Arthritis      Cancer (H) 2010    prostate     Diabetes (H)      Heart disease 2012    stents     Hypertension              Past Surgical History:     Past Surgical History:   Procedure Laterality Date     BACK SURGERY       COLONOSCOPY       CV  HEART CATHETERIZATION WITH POSSIBLE INTERVENTION N/A 11/5/2020    Procedure: Heart Catheterization with Possible Intervention with MYRON - Schedule at 10:30 AM on Thursday 11/5;  Surgeon: Ted Haider MD;  Location:  HEART CARDIAC CATH LAB     ORTHOPEDIC SURGERY                 Social History:     Social History     Tobacco Use     Smoking status: Never Smoker     Smokeless tobacco: Former User   Substance Use Topics     Alcohol use: Yes     Comment: 1 beer per week             Family History:     Family History   Problem Relation Age of Onset     Coronary Artery Disease Father              Immunizations:     There is no immunization history on file for this patient.          Allergies:   No Known Allergies          Medications:     Medications Prior to Admission   Medication Sig Dispense Refill Last Dose     amLODIPine (NORVASC) 10 MG tablet Take 1 tablet (10 mg) by mouth daily 90 tablet 3 11/5/2020 at 0600     aspirin (ASA) 81 MG EC tablet Take 1 tablet (81 mg) by mouth daily 90 tablet 3 11/4/2020 at 0800     busPIRone (BUSPAR) 10 MG tablet Take 10 mg by mouth 3 times daily   11/5/2020 at 0600     losartan (COZAAR) 50 MG tablet Take 50 mg by mouth daily   11/5/2020 at 0600     potassium chloride ER (KLOR-CON M) 20 MEQ CR tablet Take 20 mEq by mouth 2 times daily   11/5/2020 at 0600     rosuvastatin (CRESTOR) 40 MG tablet Take 1 tablet (40 mg) by mouth daily 90 tablet 3 11/5/2020 at 0600     sertraline (ZOLOFT) 50 MG tablet Take 50 mg by mouth daily   11/4/2020 at 2200     triamterene-HCTZ (DYAZIDE) 37.5-25 MG capsule Take by mouth every morning   11/4/2020 at 0800     nitroGLYcerin (NITROSTAT) 0.3 MG sublingual tablet For chest pain place 1 tablet under the tongue every 5 minutes for 3 doses. If symptoms persist 5 minutes after 1st dose call 911. 30 tablet 3 Unknown at Unknown time             Review of Systems:   The 10 point Review of Systems is negative other than noted in the HPI             Physical Exam:   Vitals were reviewed  Temp: 98.1  F (36.7  C) Temp src: Oral BP: (!) 142/79 Pulse: 89   Resp: 12 SpO2: 94 % O2 Device: None (Room air)    Gen: lying in bed, comfortable, -O2    ENT:   Normocephalic, without obvious abnormality, atraumatic, sinuses nontender on palpation, external ears without lesions, oral pharynx with moist mucous membranes, tonsils without erythema or exudates, gums normal and good dentition.     Lungs:   No increased work of breathing, good air exchange, clear to auscultation bilaterally, no crackles or wheezing     Cardiovascular:   RRR, telemetry SR 80s, -edema LE, +dorsalis pedis pulse 2+ bilaterally     Abdomen:   BS+, NT/ND, soft, no heptosplenomegaly     Musculoskeletal:   There is no redness, warmth, or swelling of the joints.  Full range of motion noted.  Motor strength is 5 out of 5 all extremities bilaterally.  Tone is normal.     Neurologic:   Awake, alert, oriented to name, place and time.  Cranial nerves II-XII are grossly intact.  Motor is 5 out of 5 bilaterally.  Cerebellar finger to nose, heel to shin intact.  Sensory is intact.  Babinski down going, Romberg negative, and gait is normal.     Skin:   No rash or acne on exposed skin of chest     +heparin gtt  +nitroglycerin gtt       Data:     Recent Labs   Lab 11/06/20  0549   WBC 9.0   HGB 15.1        Recent Labs   Lab 11/06/20  0549      POTASSIUM 3.1*   CHLORIDE 105   CO2 27   BUN 17   CR 0.83   *   NADEEN 8.7                                                                              :  Carotid US (11/5/20) - R ICA 50-69% stenosis   L ICA less than 50% stenosis  Coronary angiogram (11/5/20) - mid LAD 65% stenosed   Distal LAD 70% stenosed   1st diag 80% stenosed   RPDA is 100% stenosed and filled by collaterals from dist LAD  Echo (11/5/20) - LVEF 60-65%   No regional WMA   Normal LV size and systolic function   No significant valve disease

## 2020-11-07 LAB
GLUCOSE BLDC GLUCOMTR-MCNC: 252 MG/DL (ref 70–99)
GLUCOSE BLDC GLUCOMTR-MCNC: 257 MG/DL (ref 70–99)
GLUCOSE BLDC GLUCOMTR-MCNC: 266 MG/DL (ref 70–99)
GLUCOSE BLDC GLUCOMTR-MCNC: 277 MG/DL (ref 70–99)
GLUCOSE BLDC GLUCOMTR-MCNC: 302 MG/DL (ref 70–99)
INTERPRETATION ECG - MUSE: NORMAL
INTERPRETATION ECG - MUSE: NORMAL
MRSA DNA SPEC QL NAA+PROBE: NEGATIVE
POTASSIUM SERPL-SCNC: 3.5 MMOL/L (ref 3.4–5.3)
SPECIMEN SOURCE: NORMAL
UFH PPP CHRO-ACNC: 0.24 IU/ML
UFH PPP CHRO-ACNC: 0.3 IU/ML
UFH PPP CHRO-ACNC: 0.6 IU/ML

## 2020-11-07 PROCEDURE — 87641 MR-STAPH DNA AMP PROBE: CPT | Performed by: SURGERY

## 2020-11-07 PROCEDURE — 99232 SBSQ HOSP IP/OBS MODERATE 35: CPT | Performed by: INTERNAL MEDICINE

## 2020-11-07 PROCEDURE — 85520 HEPARIN ASSAY: CPT | Performed by: INTERNAL MEDICINE

## 2020-11-07 PROCEDURE — 87640 STAPH A DNA AMP PROBE: CPT | Performed by: SURGERY

## 2020-11-07 PROCEDURE — 84132 ASSAY OF SERUM POTASSIUM: CPT | Performed by: INTERNAL MEDICINE

## 2020-11-07 PROCEDURE — 99233 SBSQ HOSP IP/OBS HIGH 50: CPT | Performed by: INTERNAL MEDICINE

## 2020-11-07 PROCEDURE — 250N000013 HC RX MED GY IP 250 OP 250 PS 637: Performed by: INTERNAL MEDICINE

## 2020-11-07 PROCEDURE — 210N000002 HC R&B HEART CARE

## 2020-11-07 PROCEDURE — 250N000013 HC RX MED GY IP 250 OP 250 PS 637: Performed by: HOSPITALIST

## 2020-11-07 PROCEDURE — 999N001017 HC STATISTIC GLUCOSE BY METER IP

## 2020-11-07 PROCEDURE — 250N000012 HC RX MED GY IP 250 OP 636 PS 637: Performed by: INTERNAL MEDICINE

## 2020-11-07 PROCEDURE — 250N000011 HC RX IP 250 OP 636: Performed by: HOSPITALIST

## 2020-11-07 PROCEDURE — 36415 COLL VENOUS BLD VENIPUNCTURE: CPT | Performed by: INTERNAL MEDICINE

## 2020-11-07 PROCEDURE — 250N000011 HC RX IP 250 OP 636: Performed by: INTERNAL MEDICINE

## 2020-11-07 RX ORDER — LOSARTAN POTASSIUM 50 MG/1
50 TABLET ORAL DAILY
Status: DISCONTINUED | OUTPATIENT
Start: 2020-11-07 | End: 2020-11-09

## 2020-11-07 RX ORDER — POTASSIUM CHLORIDE 1500 MG/1
20 TABLET, EXTENDED RELEASE ORAL ONCE
Status: COMPLETED | OUTPATIENT
Start: 2020-11-07 | End: 2020-11-07

## 2020-11-07 RX ADMIN — HEPARIN SODIUM 1250 UNITS/HR: 10000 INJECTION, SOLUTION INTRAVENOUS at 10:57

## 2020-11-07 RX ADMIN — SERTRALINE HYDROCHLORIDE 50 MG: 50 TABLET ORAL at 08:40

## 2020-11-07 RX ADMIN — DOCUSATE SODIUM 50 MG AND SENNOSIDES 8.6 MG 1 TABLET: 8.6; 5 TABLET, FILM COATED ORAL at 08:41

## 2020-11-07 RX ADMIN — ASPIRIN 81 MG: 81 TABLET ORAL at 08:41

## 2020-11-07 RX ADMIN — ACETAMINOPHEN 650 MG: 325 TABLET, FILM COATED ORAL at 20:03

## 2020-11-07 RX ADMIN — ACETAMINOPHEN 650 MG: 325 TABLET, FILM COATED ORAL at 16:18

## 2020-11-07 RX ADMIN — BUSPIRONE HYDROCHLORIDE 10 MG: 10 TABLET ORAL at 08:40

## 2020-11-07 RX ADMIN — INSULIN ASPART 1 UNITS: 100 INJECTION, SOLUTION INTRAVENOUS; SUBCUTANEOUS at 21:26

## 2020-11-07 RX ADMIN — METOPROLOL TARTRATE 25 MG: 25 TABLET, FILM COATED ORAL at 20:03

## 2020-11-07 RX ADMIN — METOPROLOL TARTRATE 25 MG: 25 TABLET, FILM COATED ORAL at 08:40

## 2020-11-07 RX ADMIN — INSULIN GLARGINE 6 UNITS: 100 INJECTION, SOLUTION SUBCUTANEOUS at 11:34

## 2020-11-07 RX ADMIN — ACETAMINOPHEN 650 MG: 325 TABLET, FILM COATED ORAL at 00:04

## 2020-11-07 RX ADMIN — BUSPIRONE HYDROCHLORIDE 10 MG: 10 TABLET ORAL at 16:18

## 2020-11-07 RX ADMIN — ACETAMINOPHEN 650 MG: 325 TABLET, FILM COATED ORAL at 10:53

## 2020-11-07 RX ADMIN — DOCUSATE SODIUM 50 MG AND SENNOSIDES 8.6 MG 2 TABLET: 8.6; 5 TABLET, FILM COATED ORAL at 20:03

## 2020-11-07 RX ADMIN — NITROGLYCERIN 100 MCG/MIN: 20 INJECTION INTRAVENOUS at 08:43

## 2020-11-07 RX ADMIN — LOSARTAN POTASSIUM 50 MG: 50 TABLET, FILM COATED ORAL at 18:37

## 2020-11-07 RX ADMIN — BUSPIRONE HYDROCHLORIDE 10 MG: 10 TABLET ORAL at 21:26

## 2020-11-07 RX ADMIN — ONDANSETRON 4 MG: 4 TABLET, ORALLY DISINTEGRATING ORAL at 01:18

## 2020-11-07 RX ADMIN — NITROGLYCERIN 100 MCG/MIN: 20 INJECTION INTRAVENOUS at 17:13

## 2020-11-07 RX ADMIN — POTASSIUM CHLORIDE 20 MEQ: 1500 TABLET, EXTENDED RELEASE ORAL at 10:53

## 2020-11-07 RX ADMIN — AMLODIPINE BESYLATE 10 MG: 10 TABLET ORAL at 08:41

## 2020-11-07 RX ADMIN — ROSUVASTATIN CALCIUM 40 MG: 20 TABLET, FILM COATED ORAL at 08:40

## 2020-11-07 NOTE — PLAN OF CARE
A/O, VSS ex SBP 150s-160s, improved to 120s-140s as nitroglycerin titrated. Tele SR. Given tylenol and oxycodone for persistent mild HA. Noted chest tightness/heaviness 1/10 in AM, relieved as nitroglycerin increased to 50 mcg/min and BP improved. Heparin at 1300 units/hr, 10A at 00:15. SBA, steady. Denies SOB, lightheadedness, or dizziness. K replaced x2, recheck in AM. R radial site WDL, CMS intact. Plan for possible CABG when schedule permits, goal BP <130.

## 2020-11-07 NOTE — PLAN OF CARE
Patient is up with stand by assist. Tolerated food, complained off headache from nitroglycerine drip. Family at bedside. Heparin drip. Surgery Monday.

## 2020-11-07 NOTE — PROGRESS NOTES
CV Surgery    Patient seen and examined. Surgery explained in detail with wife present. Planning tentatively for Monday @ 0720 pending ICU bed availability/staffing. Notify if any CP/SOB.     Will continue to follow.    Clementina Giron PA-C

## 2020-11-07 NOTE — PROGRESS NOTES
[id number]: alert and oriented. Tele SR. On Nitroglycerin, chest pain free, titrating for BP control. Heparin infusing at 1300 units/hr. Recheck at 0015. R radial site wdl, cms intact. Will continue to monitor.

## 2020-11-07 NOTE — PROGRESS NOTES
[facility]    Hospitalist Progress Note    Assessment & Plan   Adrian Petty is a 60 year old male with a history of CAD status post stenting at [facility] in [place] as recent as 2013, hypertension, hyperlipidemia, prostate cancer status post surgery, chemo, and radiation, and new diagnosis of type 2 diabetes who has been experiencing worsening exertional shortness of breath and chest discomfort.  Cardiology was following the patient and moved up his elective/outpatient angiogram 11/5.  Subsequent to the angiogram which showed multivessel disease, patient was being evaluated for CABG consideration with ultrasound of the legs and neck when he experienced sudden onset of shortness of breath, chest pain, nausea and vomiting.  He is subsequently admitted with nitroglycerin infusion ongoing to the CICU.  CAD - multivessel disease  Prior stenting at [facility] - most recently at 2013  Ongoing progressive exertional SOB and chest pain. Angiogram today with MVD. Subsequently developed chest pain, N/V, and sob during US. RRT called, received SLN x2 with some improvement. EKG not acute. Trop negative.  -Patient seen by cardiology and CV surgery, surgery date tentatively on Monday 11/9, for now continue heparin and nitro drips.  -Continue telemetry  - PTA statin, amlodipine continued, metoprolol 25 mg twice daily, blood pressure still above 140 systolic, patient is also on nitro drip.  - ASA to continue.     Hypertension  Hyperlipidemia  BP elevated earlier today, improved since.  -Nitroglycerin infusion as above  - PTA regimen (as above) to continue, cardiology made adjustments to his antihypertensives.  Blood pressures are better controlled now.     T2 Diabetes Mellitus-uncontrolled  A1c 8.0% 11/4.   - sliding scale insulin to continue, added lantus today, will assess on that, if needed will add pre meal coverage  tomorrow   - Prior to discharge will need diabetic education and proper  follow up  -Patient will need outpatient ophthalmology appointments set up upon discharge.     History of prostate cancer, s/p resection, chemo, and radiation  Noted. No change in management while in hospital for now.     Asymptomatic COVID 19 screening  Felt to be low suspicion. No special precautions indicated.  - negative swab 11/4  DVT Prophylaxis: On heparin drip  Code Status: Full Code     Disposition: Expected discharge depending on date of his surgery.    Lily Canchola MD  Text Page   (7am to 6pm)    Interval History   Patient resting comfortably, tentative date of surgery decided to be 11/9, family near bedside, he denies any chest pain, we had a brief discussion about his diabetes, he was not aware about this diagnosis prior to this admission, he knew he might have borderline diabetes but will need outpatient diabetic education set up upon discharge.  For now we briefly discussed about the need to continue on insulin, at the time of discharge possibly can transition to long-acting insulin and oral hypoglycemic agents.  He denies any nausea, vomiting or abdominal pain.  No shortness of breath noted.    -Data reviewed today: I reviewed all new labs and imaging results over the last 24 hours.     Physical Exam   Temp: 98.3  F (36.8  C) Temp src: Oral BP: (!) 146/81 Pulse: 93   Resp: 16 SpO2: 97 % O2 Device: None (Room air) Oxygen Delivery: 2 LPM  Vitals:    11/06/20 0223 11/07/20 0652   Weight: 101.4 kg (223 lb 9.6 oz) 103.6 kg (228 lb 6.4 oz)     Vital Signs with Ranges  Temp:  [98.3  F (36.8  C)-99.3  F (37.4  C)] 98.3  F (36.8  C)  Pulse:  [77-93] 93  Resp:  [10-18] 16  BP: (116-171)/() 146/81  SpO2:  [92 %-97 %] 97 %  I/O last 3 completed shifts:  In: 336.08 [I.V.:336.08]  Out: 725 [Urine:725]    Constitutional: Awake, alert, cooperative, no apparent distress  Respiratory: Clear to auscultation bilaterally, no crackles or wheezing  Cardiovascular: Regular rate and rhythm, normal S1 and S2, and no  murmur noted  GI: Normal bowel sounds, soft, non-distended, non-tender  Skin/Integumen: No rashes, no cyanosis, no edema  Neuro : moving all 4 extremities, no focal deficit noted     Medications     heparin 1,100 Units/hr (11/07/20 0850)     nitroGLYcerin 100 mcg/min (11/07/20 0843)     sodium chloride 75 mL/hr at 11/05/20 1820       amLODIPine  10 mg Oral Daily     aspirin  81 mg Oral Daily     busPIRone  10 mg Oral TID     heparin ANTICOAGULANT Loading dose  3,000 Units Intravenous Once     insulin aspart  1-3 Units Subcutaneous TID AC     insulin aspart  1-3 Units Subcutaneous At Bedtime     insulin glargine  6 Units Subcutaneous QAM AC     metoprolol tartrate  25 mg Oral BID     polyethylene glycol  17 g Oral Daily     potassium chloride  20 mEq Oral Once     rosuvastatin  40 mg Oral Daily     senna-docusate  1 tablet Oral BID    Or     senna-docusate  2 tablet Oral BID     sertraline  50 mg Oral Daily     sodium chloride (PF)  3 mL Intracatheter Q8H       Data   Recent Labs   Lab 11/07/20  0820 11/06/20  1423 11/06/20  0549 11/06/20  0154 11/06/20  0043 11/05/20  2059 11/05/20  1656 11/05/20  0845 11/04/20  0808   WBC  --   --  9.0 8.7  --   --   --   --  6.1   HGB  --   --  15.1 15.0  --   --   --   --  16.6   MCV  --   --  86 86  --   --   --   --  85   PLT  --   --  204 197  --   --   --   --  212   INR  --   --   --   --   --   --  1.07  --  1.02   NA  --   --  138  --   --   --   --   --  135   POTASSIUM 3.5 3.6 3.1*  --   --   --   --   --  3.3*   CHLORIDE  --   --  105  --   --   --   --   --  100   CO2  --   --  27  --   --   --   --   --  30   BUN  --   --  17  --   --   --   --   --  17   CR  --   --  0.83  --   --   --   --   --  0.88   ANIONGAP  --   --  6  --   --   --   --   --  5   NADEEN  --   --  8.7  --   --   --   --   --  9.1   GLC  --   --  230*  --   --   --   --   --  232*   ALBUMIN  --   --   --   --   --   --   --  4.2  --    PROTTOTAL  --   --   --   --   --   --   --  8.0  --     BILITOTAL  --   --   --   --   --   --   --  0.7  --    ALKPHOS  --   --   --   --   --   --   --  61  --    ALT  --   --   --   --   --   --   --  36  --    AST  --   --   --   --   --   --   --  19  --    TROPI  --   --   --   --  0.127* 0.043 <0.015  --   --      Recent Labs   Lab 11/07/20  0726 11/07/20  0219 11/06/20  2127 11/06/20  1805 11/06/20  1257 11/06/20  0549 11/06/20  0549 11/04/20  0808 11/04/20  0808   GLC  --   --   --   --   --   --  230*  --  232*   * 252* 262* 242* 231*   < >  --    < >  --     < > = values in this interval not displayed.       Imaging:   No results found for this or any previous visit (from the past 24 hour(s)).

## 2020-11-07 NOTE — PLAN OF CARE
A/O, VSS ex BP 140s/80s. O2sats 97% on RA-2L. Tele NSR. Given tylenol and ice pack for mild HA, also 2 L nc as pt feel this improves HA. Independent, steady. R radial site WDL, CMS intact. Heparin at 1250 units/hr, recheck 17:00. Nitroglycerin at 100 mcg/min. Viewed CABG education videos. Plan for possible CABG on Monday.

## 2020-11-08 LAB
ABO + RH BLD: NORMAL
ABO + RH BLD: NORMAL
BLD GP AB SCN SERPL QL: NORMAL
BLD PROD TYP BPU: NORMAL
BLOOD BANK CMNT PATIENT-IMP: NORMAL
GLUCOSE BLDC GLUCOMTR-MCNC: 171 MG/DL (ref 70–99)
GLUCOSE BLDC GLUCOMTR-MCNC: 208 MG/DL (ref 70–99)
GLUCOSE BLDC GLUCOMTR-MCNC: 224 MG/DL (ref 70–99)
GLUCOSE BLDC GLUCOMTR-MCNC: 226 MG/DL (ref 70–99)
GLUCOSE BLDC GLUCOMTR-MCNC: 380 MG/DL (ref 70–99)
HBA1C MFR BLD: 7.7 % (ref 0–5.6)
NUM BPU REQUESTED: 4
POTASSIUM SERPL-SCNC: 3.7 MMOL/L (ref 3.4–5.3)
SPECIMEN EXP DATE BLD: NORMAL
UFH PPP CHRO-ACNC: 0.2 IU/ML
UFH PPP CHRO-ACNC: 0.39 IU/ML

## 2020-11-08 PROCEDURE — 250N000011 HC RX IP 250 OP 636: Performed by: INTERNAL MEDICINE

## 2020-11-08 PROCEDURE — 83036 HEMOGLOBIN GLYCOSYLATED A1C: CPT | Performed by: INTERNAL MEDICINE

## 2020-11-08 PROCEDURE — 250N000013 HC RX MED GY IP 250 OP 250 PS 637: Performed by: INTERNAL MEDICINE

## 2020-11-08 PROCEDURE — G0463 HOSPITAL OUTPT CLINIC VISIT: HCPCS

## 2020-11-08 PROCEDURE — 36415 COLL VENOUS BLD VENIPUNCTURE: CPT | Performed by: INTERNAL MEDICINE

## 2020-11-08 PROCEDURE — 250N000009 HC RX 250: Performed by: SURGERY

## 2020-11-08 PROCEDURE — 250N000011 HC RX IP 250 OP 636: Performed by: HOSPITALIST

## 2020-11-08 PROCEDURE — 86900 BLOOD TYPING SEROLOGIC ABO: CPT | Performed by: INTERNAL MEDICINE

## 2020-11-08 PROCEDURE — 250N000012 HC RX MED GY IP 250 OP 636 PS 637: Performed by: INTERNAL MEDICINE

## 2020-11-08 PROCEDURE — 84132 ASSAY OF SERUM POTASSIUM: CPT | Performed by: INTERNAL MEDICINE

## 2020-11-08 PROCEDURE — 86923 COMPATIBILITY TEST ELECTRIC: CPT | Performed by: INTERNAL MEDICINE

## 2020-11-08 PROCEDURE — 999N001017 HC STATISTIC GLUCOSE BY METER IP

## 2020-11-08 PROCEDURE — 85520 HEPARIN ASSAY: CPT | Performed by: INTERNAL MEDICINE

## 2020-11-08 PROCEDURE — 86901 BLOOD TYPING SEROLOGIC RH(D): CPT | Performed by: INTERNAL MEDICINE

## 2020-11-08 PROCEDURE — 99233 SBSQ HOSP IP/OBS HIGH 50: CPT | Performed by: INTERNAL MEDICINE

## 2020-11-08 PROCEDURE — 210N000002 HC R&B HEART CARE

## 2020-11-08 PROCEDURE — 86850 RBC ANTIBODY SCREEN: CPT | Performed by: INTERNAL MEDICINE

## 2020-11-08 PROCEDURE — 999N000157 HC STATISTIC RCP TIME EA 10 MIN

## 2020-11-08 PROCEDURE — 250N000013 HC RX MED GY IP 250 OP 250 PS 637: Performed by: HOSPITALIST

## 2020-11-08 RX ORDER — MUPIROCIN 20 MG/G
OINTMENT TOPICAL 2 TIMES DAILY
Status: COMPLETED | OUTPATIENT
Start: 2020-11-08 | End: 2020-11-09

## 2020-11-08 RX ORDER — POTASSIUM CHLORIDE 1500 MG/1
20 TABLET, EXTENDED RELEASE ORAL ONCE
Status: COMPLETED | OUTPATIENT
Start: 2020-11-08 | End: 2020-11-08

## 2020-11-08 RX ORDER — NICOTINE POLACRILEX 4 MG
15-30 LOZENGE BUCCAL
Status: DISCONTINUED | OUTPATIENT
Start: 2020-11-08 | End: 2020-11-09

## 2020-11-08 RX ORDER — TRIAMTERENE/HYDROCHLOROTHIAZID 37.5-25 MG
1 TABLET ORAL DAILY
Status: DISCONTINUED | OUTPATIENT
Start: 2020-11-08 | End: 2020-11-09

## 2020-11-08 RX ORDER — METOPROLOL TARTRATE 25 MG/1
25 TABLET, FILM COATED ORAL
Status: DISCONTINUED | OUTPATIENT
Start: 2020-11-08 | End: 2020-11-08 | Stop reason: CLARIF

## 2020-11-08 RX ORDER — DEXTROSE MONOHYDRATE 25 G/50ML
25-50 INJECTION, SOLUTION INTRAVENOUS
Status: DISCONTINUED | OUTPATIENT
Start: 2020-11-08 | End: 2020-11-09

## 2020-11-08 RX ORDER — FAMOTIDINE 20 MG/1
20 TABLET, FILM COATED ORAL
Status: COMPLETED | OUTPATIENT
Start: 2020-11-09 | End: 2020-11-09

## 2020-11-08 RX ADMIN — ACETAMINOPHEN 650 MG: 325 TABLET, FILM COATED ORAL at 21:04

## 2020-11-08 RX ADMIN — HEPARIN SODIUM 1250 UNITS/HR: 10000 INJECTION, SOLUTION INTRAVENOUS at 05:48

## 2020-11-08 RX ADMIN — METOPROLOL TARTRATE 25 MG: 25 TABLET, FILM COATED ORAL at 21:04

## 2020-11-08 RX ADMIN — LOSARTAN POTASSIUM 50 MG: 50 TABLET, FILM COATED ORAL at 08:07

## 2020-11-08 RX ADMIN — TRIAMTERENE AND HYDROCHLOROTHIAZIDE 1 TABLET: 37.5; 25 TABLET ORAL at 16:21

## 2020-11-08 RX ADMIN — ONDANSETRON 4 MG: 4 TABLET, ORALLY DISINTEGRATING ORAL at 05:52

## 2020-11-08 RX ADMIN — INSULIN GLARGINE 4 UNITS: 100 INJECTION, SOLUTION SUBCUTANEOUS at 12:55

## 2020-11-08 RX ADMIN — INSULIN GLARGINE 6 UNITS: 100 INJECTION, SOLUTION SUBCUTANEOUS at 08:08

## 2020-11-08 RX ADMIN — NITROGLYCERIN 80 MCG/MIN: 20 INJECTION INTRAVENOUS at 19:27

## 2020-11-08 RX ADMIN — BUSPIRONE HYDROCHLORIDE 10 MG: 10 TABLET ORAL at 21:04

## 2020-11-08 RX ADMIN — ASPIRIN 81 MG: 81 TABLET ORAL at 08:07

## 2020-11-08 RX ADMIN — INSULIN ASPART 1 UNITS: 100 INJECTION, SOLUTION INTRAVENOUS; SUBCUTANEOUS at 22:43

## 2020-11-08 RX ADMIN — BUSPIRONE HYDROCHLORIDE 10 MG: 10 TABLET ORAL at 08:07

## 2020-11-08 RX ADMIN — METOPROLOL TARTRATE 25 MG: 25 TABLET, FILM COATED ORAL at 08:06

## 2020-11-08 RX ADMIN — MUPIROCIN: 20 OINTMENT TOPICAL at 21:04

## 2020-11-08 RX ADMIN — POTASSIUM CHLORIDE 20 MEQ: 1500 TABLET, EXTENDED RELEASE ORAL at 08:07

## 2020-11-08 RX ADMIN — ROSUVASTATIN CALCIUM 40 MG: 20 TABLET, FILM COATED ORAL at 08:07

## 2020-11-08 RX ADMIN — NITROGLYCERIN 100 MCG/MIN: 20 INJECTION INTRAVENOUS at 09:50

## 2020-11-08 RX ADMIN — ACETAMINOPHEN 650 MG: 325 TABLET, FILM COATED ORAL at 00:23

## 2020-11-08 RX ADMIN — SERTRALINE HYDROCHLORIDE 50 MG: 50 TABLET ORAL at 08:08

## 2020-11-08 RX ADMIN — BUSPIRONE HYDROCHLORIDE 10 MG: 10 TABLET ORAL at 16:01

## 2020-11-08 RX ADMIN — POLYETHYLENE GLYCOL 3350 17 G: 17 POWDER, FOR SOLUTION ORAL at 08:08

## 2020-11-08 RX ADMIN — DOCUSATE SODIUM 50 MG AND SENNOSIDES 8.6 MG 2 TABLET: 8.6; 5 TABLET, FILM COATED ORAL at 08:08

## 2020-11-08 RX ADMIN — HEPARIN SODIUM 1400 UNITS/HR: 10000 INJECTION, SOLUTION INTRAVENOUS at 22:33

## 2020-11-08 RX ADMIN — NITROGLYCERIN 100 MCG/MIN: 20 INJECTION INTRAVENOUS at 01:13

## 2020-11-08 RX ADMIN — ACETAMINOPHEN 650 MG: 325 TABLET, FILM COATED ORAL at 08:07

## 2020-11-08 RX ADMIN — AMLODIPINE BESYLATE 10 MG: 10 TABLET ORAL at 08:08

## 2020-11-08 SDOH — HEALTH STABILITY: MENTAL HEALTH: CURRENT SMOKER: 0

## 2020-11-08 NOTE — PROGRESS NOTES
[facility] Cardiology Progress Note           Assessment and Plan:   Assessment:    Mr. Adrian Petty is a very pleasant 60-year-old gentleman who is currently admitted with significant multivessel coronary artery disease and is awaiting coronary artery bypass grafting with CV surgery, currently tentatively scheduled to take place on Monday, 11/9/2020.  History is otherwise notable for hypertension, dyslipidemia, diabetes type 2, and prostate cancer status post surgery and chemoradiation.    Today, he has reported that he is overall doing well.  He has remained chest pain-free.  He remains on a nitroglycerin drip, primarily for maintaining systolic blood pressures in the 120s to 130s systolic, as he was quite hypertensive when he was initially admitted here.  Unfortunately, he is experiencing significant headaches that are limiting his sleep and making him feel quite poorly throughout the day as a result of the nitroglycerin.  He has been restarted on some of his home antihypertensives, but I see that at his baseline he takes 4 different agents, and only 2 of them of been restarted here so far.  Right now, in addition to the nitroglycerin drip, he is receiving amlodipine 10 mg daily and metoprolol 25 mg twice daily.  His home losartan and triamterene hydrochlorothiazide have not been restarted.  I will restart his home losartan 50 mg daily tonight, with the hope that we can wean down on the nitroglycerin (which is currently being used primarily for BP control) so we can improve his headaches.     Plan:  #1 Multivessel CAD with NSTEMI   #2 Hypertension  #3 Dyslipidemia   #4 Diabetes mellitus     - Continue heparin   - Continue amlodipine 10 mg daily, metoprolol 25 mg BID, and restart losartan 50 mg today  - Continue NTG for assistance with maintaining -130 mmHg, but hopefully this can be weaned down or off completely with restarting his home medications. If blood pressure is still suboptimally  controlled after restarting his home losartan, would then restart his home triamterene-hydrochlorothiazide.   - Plan for CABG Monday 11/09/20        Interval History:   No acute events.           Significant Problems:     Past Medical History:   Diagnosis Date     Arthritis      Cancer (H) 2010    prostate     Diabetes (H)      Heart disease 2012    stents     Hypertension              Review of Systems:   A complete review of systems was performed and found to be negative except as otherwise noted per HPI.           Medications:     Medications Prior to Admission   Medication Sig Dispense Refill Last Dose     amLODIPine (NORVASC) 10 MG tablet Take 1 tablet (10 mg) by mouth daily 90 tablet 3 11/5/2020 at 0600     aspirin (ASA) 81 MG EC tablet Take 1 tablet (81 mg) by mouth daily 90 tablet 3 11/4/2020 at 0800     busPIRone (BUSPAR) 10 MG tablet Take 10 mg by mouth 3 times daily   11/5/2020 at 0600     losartan (COZAAR) 50 MG tablet Take 50 mg by mouth daily   11/5/2020 at 0600     potassium chloride ER (KLOR-CON M) 20 MEQ CR tablet Take 20 mEq by mouth 2 times daily   11/5/2020 at 0600     rosuvastatin (CRESTOR) 40 MG tablet Take 1 tablet (40 mg) by mouth daily 90 tablet 3 11/5/2020 at 0600     sertraline (ZOLOFT) 50 MG tablet Take 50 mg by mouth daily   11/4/2020 at 2200     triamterene-HCTZ (DYAZIDE) 37.5-25 MG capsule Take by mouth every morning   11/4/2020 at 0800     nitroGLYcerin (NITROSTAT) 0.3 MG sublingual tablet For chest pain place 1 tablet under the tongue every 5 minutes for 3 doses. If symptoms persist 5 minutes after 1st dose call 911. 30 tablet 3 Unknown at Unknown time             Physical Exam:   Vitals were reviewed  Temp: 98.5  F (36.9  C) Temp src: Oral BP: 136/73 Pulse: 76   Resp: 12 SpO2: 97 % O2 Device: Nasal cannula Oxygen Delivery: 2 LPM    General: The patient is pleasant well-appearing gentleman who appears his stated age and is in no acute distress.  Eyes: Anicteric sclera.  ENT: Exam  normal.  Vessels: Jugular venous pressure within normal limits.  Radial and dorsalis pedis pulses intact bilaterally.  Heart: Normal S1, S2.  Regular rate and rhythm.  No murmurs, gallops, or rubs appreciated.  Lungs: Clear to auscultation bilaterally with good inspiratory effort.  No wheezes, rales, or rhonchi noted.  Abdomen: Soft, nontender.  Nondistended.  Extremities: Warm, well-perfused.  No cyanosis, clubbing, or lower extremity edema noted.  Musculoskeletal: Exam normal.  Mental: Patient is alert and oriented x3 with appropriate mood and affect.  Skin: No concerning lesions noted.          Data:     Lab Results   Component Value Date    WBC 9.0 11/06/2020    HGB 15.1 11/06/2020    HCT 44.3 11/06/2020     11/06/2020     11/06/2020    POTASSIUM 3.5 11/07/2020    CHLORIDE 105 11/06/2020    CO2 27 11/06/2020    BUN 17 11/06/2020    CR 0.83 11/06/2020     (H) 11/06/2020    TROPI 0.127 (HH) 11/06/2020    AST 19 11/05/2020    ALT 36 11/05/2020    ALKPHOS 61 11/05/2020    BILITOTAL 0.7 11/05/2020    INR 1.07 11/05/2020            Attestation:  I have reviewed today's vital signs, notes, medications, labs and imaging.     Aaron Pascal MD

## 2020-11-08 NOTE — PLAN OF CARE
Patient is SBA in the room with wife. Tolerated food, denies pain, weaned nitroglycerin drip down. Surgery tomorrow first case.

## 2020-11-08 NOTE — PLAN OF CARE
Tele SR. A&O, pleasant/cooperative. Nitroglycerin and heparin gtts, c/o headache. Tylenol given with limited effect, ice packs to neck and rest effective. Up SBA. Plan for CABG Monday. Continue to monitor.

## 2020-11-08 NOTE — PROGRESS NOTES
Pre-op heart/IS instruct completed. Pt achieved 2,000 ml on IS with good pt effort.  Will cont to follow after surgery.  11/8/2020  Chana Monroe, RT

## 2020-11-08 NOTE — PROGRESS NOTES
CV Surgery    Patient seen and examined. Surgery tomorrow am with Dr. Herrera. All questions answered to patient's satisfaction.    Clementina Giron PA-C

## 2020-11-08 NOTE — PROGRESS NOTES
[facility]    Hospitalist Progress Note    Assessment & Plan   Adrian Petty is a 60 year old male with a history of CAD status post stenting at [facility] in [place] as recent as 2013, hypertension, hyperlipidemia, prostate cancer status post surgery, chemo, and radiation, and new diagnosis of type 2 diabetes who has been experiencing worsening exertional shortness of breath and chest discomfort.  Cardiology was following the patient and moved up his elective/outpatient angiogram 11/5.  Subsequent to the angiogram which showed multivessel disease, patient was being evaluated for CABG consideration with ultrasound of the legs and neck when he experienced sudden onset of shortness of breath, chest pain, nausea and vomiting.  He is subsequently admitted with nitroglycerin infusion ongoing to the CICU.  CAD - multivessel disease  Prior stenting at [facility]SH - most recently at 2013  Ongoing progressive exertional SOB and chest pain. Angiogram today with MVD. Subsequently developed chest pain, N/V, and sob during US. RRT called, received SLN x2 with some improvement. EKG not acute. Trop negative.  -Patient seen by cardiology and CV surgery, surgery date tentatively on Monday 11/9, for now continue heparin and nitro drips.  -Continue telemetry  - PTA statin, amlodipine continued, metoprolol 25 mg twice daily, blood pressure still above 140 systolic, losartan restarted 11/7 patient is also on nitro drip.  - ASA to continue.     Hypertension  Hyperlipidemia  Blood pressure continues to remain elevated cardiology adjusting medications  -Nitroglycerin infusion as above  - PTA regimen (as above) to continue, cardiology made adjustments to his antihypertensives.  PTA losartan  was started on 11/7.     T2 Diabetes Mellitus-uncontrolled  A1c 8.0% 11/4.   - sliding scale insulin to continue, a added Lantus coverage on 11/7, since he has surgery tomorrow I am not adjusting his morning Lantus dose  for tomorrow, currently he received 10 units on 11/8.  Added premeal coverage, diet changed to diabetic diet.  He will be on insulin drip following surgery, will need further readjustments when he once he is transition him to subcu insulin.  - Prior to discharge will need diabetic education and proper follow up  -Patient will need outpatient ophthalmology appointments set up upon discharge.     History of prostate cancer, s/p resection, chemo, and radiation  Noted. No change in management while in hospital for now.     Asymptomatic COVID 19 screening  Felt to be low suspicion. No special precautions indicated.  - negative swab 11/4  DVT Prophylaxis: On heparin drip  Code Status: Full Code     Disposition: Expected discharge depending on date of his surgery.    Lily Canchola MD  Text Page   (7am to 6pm)    Interval History   Patient resting comfortably, had some neck pain, was asking if he can use topical medications for neck pain, his blood sugars are still elevated, he was started on Lantus yesterday, he is on regular diet, change diet to diabetic diet, plan for surgery tomorrow, n.p.o. from midnight.  Denies any chest pain, blood pressure is elevated.    -Data reviewed today: I reviewed all new labs and imaging results over the last 24 hours.     Physical Exam   Temp: 98.6  F (37  C) Temp src: Oral BP: (!) 143/86 Pulse: 74   Resp: 8 SpO2: 98 % O2 Device: None (Room air) Oxygen Delivery: 2 LPM  Vitals:    11/06/20 0223 11/07/20 0652 11/08/20 0635   Weight: 101.4 kg (223 lb 9.6 oz) 103.6 kg (228 lb 6.4 oz) 104.6 kg (230 lb 9.6 oz)     Vital Signs with Ranges  Temp:  [98.2  F (36.8  C)-98.7  F (37.1  C)] 98.6  F (37  C)  Pulse:  [63-89] 74  Resp:  [8-24] 8  BP: (132-161)/(71-94) 143/86  SpO2:  [96 %-98 %] 98 %  I/O last 3 completed shifts:  In: 863.43 [P.O.:350; I.V.:513.43]  Out: 1075 [Urine:1075]    Constitutional: Awake, alert, cooperative, no apparent distress  Respiratory: Clear to auscultation bilaterally, no  crackles or wheezing  Cardiovascular: Regular rate and rhythm, normal S1 and S2, and no murmur noted  GI: Normal bowel sounds, soft, non-distended, non-tender  Skin/Integumen: No rashes, no cyanosis, no edema  Neuro : moving all 4 extremities, no focal deficit noted     Medications     heparin 1,250 Units/hr (11/08/20 0548)     nitroGLYcerin 100 mcg/min (11/08/20 0950)     sodium chloride 75 mL/hr at 11/05/20 1820       amLODIPine  10 mg Oral Daily     aspirin  81 mg Oral Daily     busPIRone  10 mg Oral TID     influenza recomb quadrivalent PF  0.5 mL Intramuscular Prior to discharge     [START ON 11/9/2020] insulin aspart   Subcutaneous QAM AC     insulin aspart   Subcutaneous Daily with lunch     insulin aspart   Subcutaneous Daily with supper     insulin aspart  1-3 Units Subcutaneous TID AC     insulin aspart  1-3 Units Subcutaneous At Bedtime     insulin glargine  4 Units Subcutaneous Once     [START ON 11/9/2020] insulin glargine  8 Units Subcutaneous QAM AC     losartan  50 mg Oral Daily     metoprolol tartrate  25 mg Oral BID     polyethylene glycol  17 g Oral Daily     rosuvastatin  40 mg Oral Daily     senna-docusate  1 tablet Oral BID    Or     senna-docusate  2 tablet Oral BID     sertraline  50 mg Oral Daily     sodium chloride (PF)  3 mL Intracatheter Q8H       Data   Recent Labs   Lab 11/08/20  0520 11/07/20  0820 11/06/20  1423 11/06/20  0549 11/06/20  0154 11/06/20  0043 11/05/20  2059 11/05/20  1656 11/05/20  0845 11/04/20  0808   WBC  --   --   --  9.0 8.7  --   --   --   --  6.1   HGB  --   --   --  15.1 15.0  --   --   --   --  16.6   MCV  --   --   --  86 86  --   --   --   --  85   PLT  --   --   --  204 197  --   --   --   --  212   INR  --   --   --   --   --   --   --  1.07  --  1.02   NA  --   --   --  138  --   --   --   --   --  135   POTASSIUM 3.7 3.5 3.6 3.1*  --   --   --   --   --  3.3*   CHLORIDE  --   --   --  105  --   --   --   --   --  100   CO2  --   --   --  27  --   --   --    --   --  30   BUN  --   --   --  17  --   --   --   --   --  17   CR  --   --   --  0.83  --   --   --   --   --  0.88   ANIONGAP  --   --   --  6  --   --   --   --   --  5   NADEEN  --   --   --  8.7  --   --   --   --   --  9.1   GLC  --   --   --  230*  --   --   --   --   --  232*   ALBUMIN  --   --   --   --   --   --   --   --  4.2  --    PROTTOTAL  --   --   --   --   --   --   --   --  8.0  --    BILITOTAL  --   --   --   --   --   --   --   --  0.7  --    ALKPHOS  --   --   --   --   --   --   --   --  61  --    ALT  --   --   --   --   --   --   --   --  36  --    AST  --   --   --   --   --   --   --   --  19  --    TROPI  --   --   --   --   --  0.127* 0.043 <0.015  --   --      Recent Labs   Lab 11/08/20  0751 11/08/20  0232 11/07/20  2105 11/07/20  1715 11/07/20  1356 11/06/20  0549 11/06/20  0549 11/04/20  0808 11/04/20  0808   GLC  --   --   --   --   --   --  230*  --  232*   * 224* 257* 302* 277*   < >  --    < >  --     < > = values in this interval not displayed.       Imaging:   No results found for this or any previous visit (from the past 24 hour(s)).

## 2020-11-08 NOTE — PROGRESS NOTES
#1 Multivessel CAD with NSTEMI   #2 Hypertension  #3 Dyslipidemia   #4 Diabetes mellitus     No significant change in plan from yesterday; plan to proceed with CABG tomorrow per CV surgery. Restart home triamterene/HCTZ today, as BP is still suboptimally controlled. Continue heparin drip and NTG drip as needed until surgery tomorrow.

## 2020-11-09 ENCOUNTER — ANESTHESIA (OUTPATIENT)
Dept: SURGERY | Facility: CLINIC | Age: 60
End: 2020-11-09
Payer: COMMERCIAL

## 2020-11-09 ENCOUNTER — APPOINTMENT (OUTPATIENT)
Dept: GENERAL RADIOLOGY | Facility: CLINIC | Age: 60
End: 2020-11-09
Attending: PHYSICIAN ASSISTANT
Payer: COMMERCIAL

## 2020-11-09 PROBLEM — I25.790: Status: ACTIVE | Noted: 2020-11-09

## 2020-11-09 LAB
ALBUMIN SERPL-MCNC: 3.2 G/DL (ref 3.4–5)
ALP SERPL-CCNC: 41 U/L (ref 40–150)
ALT SERPL W P-5'-P-CCNC: 34 U/L (ref 0–70)
ANION GAP SERPL CALCULATED.3IONS-SCNC: 5 MMOL/L (ref 3–14)
ANION GAP SERPL CALCULATED.3IONS-SCNC: 5 MMOL/L (ref 3–14)
ANION GAP SERPL CALCULATED.3IONS-SCNC: 9 MMOL/L (ref 3–14)
APTT PPP: 39 SEC (ref 22–37)
APTT PPP: 44 SEC (ref 22–37)
AST SERPL W P-5'-P-CCNC: 44 U/L (ref 0–45)
BASE DEFICIT BLDA-SCNC: 1.9 MMOL/L
BILIRUB SERPL-MCNC: 0.7 MG/DL (ref 0.2–1.3)
BLD PROD TYP BPU: NORMAL
BLD PROD TYP BPU: NORMAL
BLD UNIT ID BPU: 0
BLD UNIT ID BPU: 0
BLOOD PRODUCT CODE: NORMAL
BLOOD PRODUCT CODE: NORMAL
BPU ID: NORMAL
BPU ID: NORMAL
BUN SERPL-MCNC: 11 MG/DL (ref 7–30)
BUN SERPL-MCNC: 11 MG/DL (ref 7–30)
BUN SERPL-MCNC: 12 MG/DL (ref 7–30)
CA-I BLD-MCNC: 4.5 MG/DL (ref 4.4–5.2)
CALCIUM SERPL-MCNC: 7.9 MG/DL (ref 8.5–10.1)
CALCIUM SERPL-MCNC: 8 MG/DL (ref 8.5–10.1)
CALCIUM SERPL-MCNC: 8.6 MG/DL (ref 8.5–10.1)
CHLORIDE SERPL-SCNC: 106 MMOL/L (ref 94–109)
CHLORIDE SERPL-SCNC: 109 MMOL/L (ref 94–109)
CHLORIDE SERPL-SCNC: 110 MMOL/L (ref 94–109)
CO2 SERPL-SCNC: 22 MMOL/L (ref 20–32)
CO2 SERPL-SCNC: 25 MMOL/L (ref 20–32)
CO2 SERPL-SCNC: 25 MMOL/L (ref 20–32)
CREAT SERPL-MCNC: 0.84 MG/DL (ref 0.66–1.25)
CREAT SERPL-MCNC: 0.89 MG/DL (ref 0.66–1.25)
CREAT SERPL-MCNC: 0.91 MG/DL (ref 0.66–1.25)
ERYTHROCYTE [DISTWIDTH] IN BLOOD BY AUTOMATED COUNT: 13.2 % (ref 10–15)
ERYTHROCYTE [DISTWIDTH] IN BLOOD BY AUTOMATED COUNT: 13.3 % (ref 10–15)
ERYTHROCYTE [DISTWIDTH] IN BLOOD BY AUTOMATED COUNT: 13.3 % (ref 10–15)
FIBRINOGEN PPP-MCNC: 266 MG/DL (ref 200–420)
GFR SERPL CREATININE-BSD FRML MDRD: 90 ML/MIN/{1.73_M2}
GFR SERPL CREATININE-BSD FRML MDRD: >90 ML/MIN/{1.73_M2}
GFR SERPL CREATININE-BSD FRML MDRD: >90 ML/MIN/{1.73_M2}
GLUCOSE BLDC GLUCOMTR-MCNC: 126 MG/DL (ref 70–99)
GLUCOSE BLDC GLUCOMTR-MCNC: 175 MG/DL (ref 70–99)
GLUCOSE BLDC GLUCOMTR-MCNC: 191 MG/DL (ref 70–99)
GLUCOSE BLDC GLUCOMTR-MCNC: 205 MG/DL (ref 70–99)
GLUCOSE BLDC GLUCOMTR-MCNC: 205 MG/DL (ref 70–99)
GLUCOSE BLDC GLUCOMTR-MCNC: 216 MG/DL (ref 70–99)
GLUCOSE BLDC GLUCOMTR-MCNC: 231 MG/DL (ref 70–99)
GLUCOSE BLDC GLUCOMTR-MCNC: 235 MG/DL (ref 70–99)
GLUCOSE SERPL-MCNC: 204 MG/DL (ref 70–99)
GLUCOSE SERPL-MCNC: 221 MG/DL (ref 70–99)
GLUCOSE SERPL-MCNC: 230 MG/DL (ref 70–99)
HCO3 BLD-SCNC: 24 MMOL/L (ref 21–28)
HCT VFR BLD AUTO: 29.8 % (ref 40–53)
HCT VFR BLD AUTO: 35.3 % (ref 40–53)
HCT VFR BLD AUTO: 41.5 % (ref 40–53)
HGB BLD-MCNC: 11.8 G/DL (ref 13.3–17.7)
HGB BLD-MCNC: 14 G/DL (ref 13.3–17.7)
HGB BLD-MCNC: 9.9 G/DL (ref 13.3–17.7)
INR PPP: 1.19 (ref 0.86–1.14)
INR PPP: 1.42 (ref 0.86–1.14)
LACTATE BLD-SCNC: 1.9 MMOL/L (ref 0.7–2)
MAGNESIUM SERPL-MCNC: 2.7 MG/DL (ref 1.6–2.3)
MCH RBC QN AUTO: 29.3 PG (ref 26.5–33)
MCHC RBC AUTO-ENTMCNC: 33.2 G/DL (ref 31.5–36.5)
MCHC RBC AUTO-ENTMCNC: 33.4 G/DL (ref 31.5–36.5)
MCHC RBC AUTO-ENTMCNC: 33.7 G/DL (ref 31.5–36.5)
MCV RBC AUTO: 87 FL (ref 78–100)
MCV RBC AUTO: 88 FL (ref 78–100)
MCV RBC AUTO: 88 FL (ref 78–100)
OXYHGB MFR BLD: 96 % (ref 92–100)
PCO2 BLD: 45 MM HG (ref 35–45)
PH BLD: 7.34 PH (ref 7.35–7.45)
PHOSPHATE SERPL-MCNC: 3.1 MG/DL (ref 2.5–4.5)
PLATELET # BLD AUTO: 150 10E9/L (ref 150–450)
PLATELET # BLD AUTO: 163 10E9/L (ref 150–450)
PLATELET # BLD AUTO: 206 10E9/L (ref 150–450)
PO2 BLD: 95 MM HG (ref 80–105)
POTASSIUM SERPL-SCNC: 3.2 MMOL/L (ref 3.4–5.3)
POTASSIUM SERPL-SCNC: 3.5 MMOL/L (ref 3.4–5.3)
POTASSIUM SERPL-SCNC: 3.8 MMOL/L (ref 3.4–5.3)
PROT SERPL-MCNC: 5.8 G/DL (ref 6.8–8.8)
RBC # BLD AUTO: 3.38 10E12/L (ref 4.4–5.9)
RBC # BLD AUTO: 4.03 10E12/L (ref 4.4–5.9)
RBC # BLD AUTO: 4.78 10E12/L (ref 4.4–5.9)
SODIUM SERPL-SCNC: 136 MMOL/L (ref 133–144)
SODIUM SERPL-SCNC: 139 MMOL/L (ref 133–144)
SODIUM SERPL-SCNC: 141 MMOL/L (ref 133–144)
TRANSFUSION STATUS PATIENT QL: NORMAL
UFH PPP CHRO-ACNC: 0.13 IU/ML
WBC # BLD AUTO: 11.7 10E9/L (ref 4–11)
WBC # BLD AUTO: 14.9 10E9/L (ref 4–11)
WBC # BLD AUTO: 6.6 10E9/L (ref 4–11)

## 2020-11-09 PROCEDURE — 83605 ASSAY OF LACTIC ACID: CPT | Performed by: HOSPITALIST

## 2020-11-09 PROCEDURE — 84100 ASSAY OF PHOSPHORUS: CPT | Performed by: PHYSICIAN ASSISTANT

## 2020-11-09 PROCEDURE — 250N000009 HC RX 250: Performed by: SURGERY

## 2020-11-09 PROCEDURE — 250N000013 HC RX MED GY IP 250 OP 250 PS 637: Performed by: INTERNAL MEDICINE

## 2020-11-09 PROCEDURE — 85027 COMPLETE CBC AUTOMATED: CPT | Performed by: INTERNAL MEDICINE

## 2020-11-09 PROCEDURE — 85610 PROTHROMBIN TIME: CPT | Performed by: HOSPITALIST

## 2020-11-09 PROCEDURE — 258N000003 HC RX IP 258 OP 636

## 2020-11-09 PROCEDURE — 85730 THROMBOPLASTIN TIME PARTIAL: CPT | Performed by: PHYSICIAN ASSISTANT

## 2020-11-09 PROCEDURE — 258N000003 HC RX IP 258 OP 636: Performed by: SURGERY

## 2020-11-09 PROCEDURE — 80048 BASIC METABOLIC PNL TOTAL CA: CPT | Performed by: HOSPITALIST

## 2020-11-09 PROCEDURE — 85384 FIBRINOGEN ACTIVITY: CPT | Performed by: HOSPITALIST

## 2020-11-09 PROCEDURE — 33508 ENDOSCOPIC VEIN HARVEST: CPT | Performed by: SURGERY

## 2020-11-09 PROCEDURE — 06BQ4ZZ EXCISION OF LEFT SAPHENOUS VEIN, PERCUTANEOUS ENDOSCOPIC APPROACH: ICD-10-PCS | Performed by: SURGERY

## 2020-11-09 PROCEDURE — 250N000011 HC RX IP 250 OP 636: Performed by: INTERNAL MEDICINE

## 2020-11-09 PROCEDURE — 999N000139 HC STATISTIC PRE-PROCEDURE ASSESSMENT II: Performed by: SURGERY

## 2020-11-09 PROCEDURE — 258N000003 HC RX IP 258 OP 636: Performed by: REGISTERED NURSE

## 2020-11-09 PROCEDURE — P9041 ALBUMIN (HUMAN),5%, 50ML: HCPCS | Performed by: REGISTERED NURSE

## 2020-11-09 PROCEDURE — 82947 ASSAY GLUCOSE BLOOD QUANT: CPT | Performed by: HOSPITALIST

## 2020-11-09 PROCEDURE — 93005 ELECTROCARDIOGRAM TRACING: CPT

## 2020-11-09 PROCEDURE — 258N000003 HC RX IP 258 OP 636: Performed by: NURSE ANESTHETIST, CERTIFIED REGISTERED

## 2020-11-09 PROCEDURE — 02100Z9 BYPASS CORONARY ARTERY, ONE ARTERY FROM LEFT INTERNAL MAMMARY, OPEN APPROACH: ICD-10-PCS | Performed by: SURGERY

## 2020-11-09 PROCEDURE — P9016 RBC LEUKOCYTES REDUCED: HCPCS | Performed by: INTERNAL MEDICINE

## 2020-11-09 PROCEDURE — 83605 ASSAY OF LACTIC ACID: CPT | Performed by: PHYSICIAN ASSISTANT

## 2020-11-09 PROCEDURE — 250N000012 HC RX MED GY IP 250 OP 636 PS 637: Performed by: PHYSICIAN ASSISTANT

## 2020-11-09 PROCEDURE — 250N000013 HC RX MED GY IP 250 OP 250 PS 637: Performed by: SURGERY

## 2020-11-09 PROCEDURE — 99291 CRITICAL CARE FIRST HOUR: CPT | Performed by: INTERNAL MEDICINE

## 2020-11-09 PROCEDURE — 85027 COMPLETE CBC AUTOMATED: CPT | Performed by: HOSPITALIST

## 2020-11-09 PROCEDURE — 99207 PR NO CHARGE LOS: CPT | Performed by: INTERNAL MEDICINE

## 2020-11-09 PROCEDURE — 85610 PROTHROMBIN TIME: CPT | Performed by: PHYSICIAN ASSISTANT

## 2020-11-09 PROCEDURE — 82805 BLOOD GASES W/O2 SATURATION: CPT | Performed by: HOSPITALIST

## 2020-11-09 PROCEDURE — 250N000011 HC RX IP 250 OP 636: Performed by: SURGERY

## 2020-11-09 PROCEDURE — 84132 ASSAY OF SERUM POTASSIUM: CPT | Performed by: HOSPITALIST

## 2020-11-09 PROCEDURE — 250N000011 HC RX IP 250 OP 636: Performed by: PHYSICIAN ASSISTANT

## 2020-11-09 PROCEDURE — 82330 ASSAY OF CALCIUM: CPT | Performed by: HOSPITALIST

## 2020-11-09 PROCEDURE — 85520 HEPARIN ASSAY: CPT | Performed by: INTERNAL MEDICINE

## 2020-11-09 PROCEDURE — P9041 ALBUMIN (HUMAN),5%, 50ML: HCPCS

## 2020-11-09 PROCEDURE — 360N000038 HC SURGERY LEVEL 6 1ST 30 MIN: Performed by: SURGERY

## 2020-11-09 PROCEDURE — 410N000005 HC PER-PERFUSION, SH ONLY, 1ST 30 MIN: Performed by: SURGERY

## 2020-11-09 PROCEDURE — 85730 THROMBOPLASTIN TIME PARTIAL: CPT | Performed by: HOSPITALIST

## 2020-11-09 PROCEDURE — C9113 INJ PANTOPRAZOLE SODIUM, VIA: HCPCS | Performed by: PHYSICIAN ASSISTANT

## 2020-11-09 PROCEDURE — 250N000012 HC RX MED GY IP 250 OP 636 PS 637: Performed by: SURGERY

## 2020-11-09 PROCEDURE — 250N000009 HC RX 250: Performed by: NURSE ANESTHETIST, CERTIFIED REGISTERED

## 2020-11-09 PROCEDURE — 250N000013 HC RX MED GY IP 250 OP 250 PS 637: Performed by: PHYSICIAN ASSISTANT

## 2020-11-09 PROCEDURE — 250N000009 HC RX 250: Performed by: PHYSICIAN ASSISTANT

## 2020-11-09 PROCEDURE — 33518 CABG ARTERY-VEIN TWO: CPT | Performed by: SURGERY

## 2020-11-09 PROCEDURE — 80048 BASIC METABOLIC PNL TOTAL CA: CPT | Performed by: INTERNAL MEDICINE

## 2020-11-09 PROCEDURE — 250N000002 HC ISOFLURANE, EA 15 MIN: Performed by: SURGERY

## 2020-11-09 PROCEDURE — 410N000006: Performed by: SURGERY

## 2020-11-09 PROCEDURE — 82803 BLOOD GASES ANY COMBINATION: CPT | Performed by: HOSPITALIST

## 2020-11-09 PROCEDURE — 258N000003 HC RX IP 258 OP 636: Performed by: PHYSICIAN ASSISTANT

## 2020-11-09 PROCEDURE — 71045 X-RAY EXAM CHEST 1 VIEW: CPT

## 2020-11-09 PROCEDURE — 021109W BYPASS CORONARY ARTERY, TWO ARTERIES FROM AORTA WITH AUTOLOGOUS VENOUS TISSUE, OPEN APPROACH: ICD-10-PCS | Performed by: SURGERY

## 2020-11-09 PROCEDURE — 360N000039 HC SURGERY LEVEL 6 EA 15 ADDTL MIN: Performed by: SURGERY

## 2020-11-09 PROCEDURE — 250N000006 HC OR RX SURGIFLO W/THROMBIN KIT 2ML 1991 OPNP: Performed by: SURGERY

## 2020-11-09 PROCEDURE — 999N001017 HC STATISTIC GLUCOSE BY METER IP

## 2020-11-09 PROCEDURE — 250N000011 HC RX IP 250 OP 636

## 2020-11-09 PROCEDURE — 999N000157 HC STATISTIC RCP TIME EA 10 MIN

## 2020-11-09 PROCEDURE — 85027 COMPLETE CBC AUTOMATED: CPT | Performed by: PHYSICIAN ASSISTANT

## 2020-11-09 PROCEDURE — 80053 COMPREHEN METABOLIC PANEL: CPT | Performed by: PHYSICIAN ASSISTANT

## 2020-11-09 PROCEDURE — 93010 ELECTROCARDIOGRAM REPORT: CPT | Performed by: INTERNAL MEDICINE

## 2020-11-09 PROCEDURE — 83735 ASSAY OF MAGNESIUM: CPT | Performed by: PHYSICIAN ASSISTANT

## 2020-11-09 PROCEDURE — 250N000011 HC RX IP 250 OP 636: Performed by: NURSE ANESTHETIST, CERTIFIED REGISTERED

## 2020-11-09 PROCEDURE — 33533 CABG ARTERIAL SINGLE: CPT | Performed by: SURGERY

## 2020-11-09 PROCEDURE — 82810 BLOOD GASES O2 SAT ONLY: CPT | Performed by: HOSPITALIST

## 2020-11-09 PROCEDURE — 36415 COLL VENOUS BLD VENIPUNCTURE: CPT | Performed by: INTERNAL MEDICINE

## 2020-11-09 PROCEDURE — 250N000009 HC RX 250: Performed by: REGISTERED NURSE

## 2020-11-09 PROCEDURE — 370N000002 HC ANESTHESIA TECHNICAL FEE, EACH ADDTL 15 MIN: Performed by: SURGERY

## 2020-11-09 PROCEDURE — 82330 ASSAY OF CALCIUM: CPT | Performed by: PHYSICIAN ASSISTANT

## 2020-11-09 PROCEDURE — 200N000001 HC R&B ICU

## 2020-11-09 PROCEDURE — 5A1221Z PERFORMANCE OF CARDIAC OUTPUT, CONTINUOUS: ICD-10-PCS | Performed by: SURGERY

## 2020-11-09 PROCEDURE — 370N000001 HC ANESTHESIA TECHNICAL FEE, 1ST 30 MIN: Performed by: SURGERY

## 2020-11-09 PROCEDURE — 84295 ASSAY OF SERUM SODIUM: CPT | Performed by: HOSPITALIST

## 2020-11-09 PROCEDURE — 250N000011 HC RX IP 250 OP 636: Performed by: REGISTERED NURSE

## 2020-11-09 PROCEDURE — 272N000001 HC OR GENERAL SUPPLY STERILE: Performed by: SURGERY

## 2020-11-09 PROCEDURE — 250N000009 HC RX 250

## 2020-11-09 RX ORDER — PHENYLEPHRINE HCL IN 0.9% NACL 50MG/250ML
.5-2 PLASTIC BAG, INJECTION (ML) INTRAVENOUS CONTINUOUS
Status: DISCONTINUED | OUTPATIENT
Start: 2020-11-09 | End: 2020-11-10

## 2020-11-09 RX ORDER — METHOCARBAMOL 750 MG/1
750 TABLET, FILM COATED ORAL 4 TIMES DAILY PRN
Status: DISCONTINUED | OUTPATIENT
Start: 2020-11-09 | End: 2020-11-14 | Stop reason: HOSPADM

## 2020-11-09 RX ORDER — CEFAZOLIN SODIUM 1 G/3ML
1 INJECTION, POWDER, FOR SOLUTION INTRAMUSCULAR; INTRAVENOUS SEE ADMIN INSTRUCTIONS
Status: DISCONTINUED | OUTPATIENT
Start: 2020-11-09 | End: 2020-11-09 | Stop reason: HOSPADM

## 2020-11-09 RX ORDER — OXYCODONE HYDROCHLORIDE 5 MG/1
5-10 TABLET ORAL
Status: DISCONTINUED | OUTPATIENT
Start: 2020-11-09 | End: 2020-11-14 | Stop reason: HOSPADM

## 2020-11-09 RX ORDER — FENTANYL CITRATE 50 UG/ML
50 INJECTION, SOLUTION INTRAMUSCULAR; INTRAVENOUS
Status: DISCONTINUED | OUTPATIENT
Start: 2020-11-09 | End: 2020-11-09 | Stop reason: HOSPADM

## 2020-11-09 RX ORDER — ACETAMINOPHEN 325 MG/1
975 TABLET ORAL EVERY 8 HOURS
Status: COMPLETED | OUTPATIENT
Start: 2020-11-09 | End: 2020-11-12

## 2020-11-09 RX ORDER — HYDROXYZINE HYDROCHLORIDE 25 MG/1
25 TABLET, FILM COATED ORAL EVERY 6 HOURS PRN
Status: DISCONTINUED | OUTPATIENT
Start: 2020-11-09 | End: 2020-11-14 | Stop reason: HOSPADM

## 2020-11-09 RX ORDER — HYDRALAZINE HYDROCHLORIDE 20 MG/ML
10 INJECTION INTRAMUSCULAR; INTRAVENOUS EVERY 30 MIN PRN
Status: DISCONTINUED | OUTPATIENT
Start: 2020-11-09 | End: 2020-11-12

## 2020-11-09 RX ORDER — NICOTINE POLACRILEX 4 MG
15-30 LOZENGE BUCCAL
Status: DISCONTINUED | OUTPATIENT
Start: 2020-11-09 | End: 2020-11-11

## 2020-11-09 RX ORDER — PROTAMINE SULFATE 10 MG/ML
INJECTION, SOLUTION INTRAVENOUS PRN
Status: DISCONTINUED | OUTPATIENT
Start: 2020-11-09 | End: 2020-11-09

## 2020-11-09 RX ORDER — NITROGLYCERIN 20 MG/100ML
INJECTION INTRAVENOUS CONTINUOUS PRN
Status: DISCONTINUED | OUTPATIENT
Start: 2020-11-09 | End: 2020-11-09

## 2020-11-09 RX ORDER — SODIUM CHLORIDE, SODIUM LACTATE, POTASSIUM CHLORIDE, CALCIUM CHLORIDE 600; 310; 30; 20 MG/100ML; MG/100ML; MG/100ML; MG/100ML
INJECTION, SOLUTION INTRAVENOUS CONTINUOUS PRN
Status: DISCONTINUED | OUTPATIENT
Start: 2020-11-09 | End: 2020-11-09

## 2020-11-09 RX ORDER — KETOROLAC TROMETHAMINE 15 MG/ML
15 INJECTION, SOLUTION INTRAMUSCULAR; INTRAVENOUS EVERY 6 HOURS PRN
Status: DISCONTINUED | OUTPATIENT
Start: 2020-11-09 | End: 2020-11-10

## 2020-11-09 RX ORDER — MUPIROCIN 20 MG/G
0.5 OINTMENT TOPICAL 2 TIMES DAILY
Status: DISCONTINUED | OUTPATIENT
Start: 2020-11-09 | End: 2020-11-09 | Stop reason: CLARIF

## 2020-11-09 RX ORDER — AMOXICILLIN 250 MG
1 CAPSULE ORAL 2 TIMES DAILY
Status: DISCONTINUED | OUTPATIENT
Start: 2020-11-09 | End: 2020-11-14 | Stop reason: HOSPADM

## 2020-11-09 RX ORDER — ONDANSETRON 4 MG/1
4 TABLET, ORALLY DISINTEGRATING ORAL EVERY 6 HOURS PRN
Status: DISCONTINUED | OUTPATIENT
Start: 2020-11-09 | End: 2020-11-14 | Stop reason: HOSPADM

## 2020-11-09 RX ORDER — LIDOCAINE HYDROCHLORIDE 20 MG/ML
INJECTION, SOLUTION INFILTRATION; PERINEURAL PRN
Status: DISCONTINUED | OUTPATIENT
Start: 2020-11-09 | End: 2020-11-09

## 2020-11-09 RX ORDER — AMOXICILLIN 250 MG
2 CAPSULE ORAL 2 TIMES DAILY
Status: DISCONTINUED | OUTPATIENT
Start: 2020-11-09 | End: 2020-11-14 | Stop reason: HOSPADM

## 2020-11-09 RX ORDER — NITROGLYCERIN 5 MG/ML
VIAL (ML) INTRAVENOUS PRN
Status: DISCONTINUED | OUTPATIENT
Start: 2020-11-09 | End: 2020-11-09

## 2020-11-09 RX ORDER — DEXMEDETOMIDINE HYDROCHLORIDE 4 UG/ML
.2-.7 INJECTION, SOLUTION INTRAVENOUS CONTINUOUS
Status: DISCONTINUED | OUTPATIENT
Start: 2020-11-09 | End: 2020-11-10

## 2020-11-09 RX ORDER — SODIUM CHLORIDE 9 MG/ML
INJECTION, SOLUTION INTRAVENOUS CONTINUOUS PRN
Status: DISCONTINUED | OUTPATIENT
Start: 2020-11-09 | End: 2020-11-09

## 2020-11-09 RX ORDER — GABAPENTIN 300 MG/1
300 CAPSULE ORAL 2 TIMES DAILY
Status: COMPLETED | OUTPATIENT
Start: 2020-11-09 | End: 2020-11-12

## 2020-11-09 RX ORDER — NALOXONE HYDROCHLORIDE 0.4 MG/ML
.1-.4 INJECTION, SOLUTION INTRAMUSCULAR; INTRAVENOUS; SUBCUTANEOUS
Status: DISCONTINUED | OUTPATIENT
Start: 2020-11-09 | End: 2020-11-14 | Stop reason: HOSPADM

## 2020-11-09 RX ORDER — LIDOCAINE 40 MG/G
CREAM TOPICAL
Status: DISCONTINUED | OUTPATIENT
Start: 2020-11-09 | End: 2020-11-14 | Stop reason: HOSPADM

## 2020-11-09 RX ORDER — MEPERIDINE HYDROCHLORIDE 25 MG/ML
12.5-25 INJECTION INTRAMUSCULAR; INTRAVENOUS; SUBCUTANEOUS
Status: DISCONTINUED | OUTPATIENT
Start: 2020-11-09 | End: 2020-11-10

## 2020-11-09 RX ORDER — LIDOCAINE 4 G/G
2 PATCH TOPICAL
Status: DISCONTINUED | OUTPATIENT
Start: 2020-11-10 | End: 2020-11-14 | Stop reason: HOSPADM

## 2020-11-09 RX ORDER — FENTANYL CITRATE 50 UG/ML
25-50 INJECTION, SOLUTION INTRAMUSCULAR; INTRAVENOUS
Status: DISCONTINUED | OUTPATIENT
Start: 2020-11-09 | End: 2020-11-10

## 2020-11-09 RX ORDER — ALBUMIN, HUMAN INJ 5% 5 %
SOLUTION INTRAVENOUS CONTINUOUS PRN
Status: DISCONTINUED | OUTPATIENT
Start: 2020-11-09 | End: 2020-11-09

## 2020-11-09 RX ORDER — VECURONIUM BROMIDE 1 MG/ML
INJECTION, POWDER, LYOPHILIZED, FOR SOLUTION INTRAVENOUS PRN
Status: DISCONTINUED | OUTPATIENT
Start: 2020-11-09 | End: 2020-11-09

## 2020-11-09 RX ORDER — FENTANYL CITRATE 50 UG/ML
INJECTION, SOLUTION INTRAMUSCULAR; INTRAVENOUS PRN
Status: DISCONTINUED | OUTPATIENT
Start: 2020-11-09 | End: 2020-11-09

## 2020-11-09 RX ORDER — POTASSIUM CHLORIDE 29.8 MG/ML
20 INJECTION INTRAVENOUS ONCE
Status: COMPLETED | OUTPATIENT
Start: 2020-11-09 | End: 2020-11-09

## 2020-11-09 RX ORDER — DEXTROSE MONOHYDRATE, SODIUM CHLORIDE, AND POTASSIUM CHLORIDE 50; 1.49; 4.5 G/1000ML; G/1000ML; G/1000ML
INJECTION, SOLUTION INTRAVENOUS CONTINUOUS
Status: DISCONTINUED | OUTPATIENT
Start: 2020-11-09 | End: 2020-11-14 | Stop reason: HOSPADM

## 2020-11-09 RX ORDER — ACETAMINOPHEN 325 MG/1
650 TABLET ORAL EVERY 4 HOURS PRN
Status: DISCONTINUED | OUTPATIENT
Start: 2020-11-12 | End: 2020-11-14 | Stop reason: HOSPADM

## 2020-11-09 RX ORDER — DEXTROSE MONOHYDRATE 25 G/50ML
25-50 INJECTION, SOLUTION INTRAVENOUS
Status: DISCONTINUED | OUTPATIENT
Start: 2020-11-09 | End: 2020-11-11

## 2020-11-09 RX ORDER — ONDANSETRON 2 MG/ML
4 INJECTION INTRAMUSCULAR; INTRAVENOUS EVERY 6 HOURS PRN
Status: DISCONTINUED | OUTPATIENT
Start: 2020-11-09 | End: 2020-11-14 | Stop reason: HOSPADM

## 2020-11-09 RX ORDER — ALBUMIN, HUMAN INJ 5% 5 %
500-1000 SOLUTION INTRAVENOUS
Status: DISCONTINUED | OUTPATIENT
Start: 2020-11-09 | End: 2020-11-10

## 2020-11-09 RX ORDER — DEXTROSE MONOHYDRATE 100 MG/ML
INJECTION, SOLUTION INTRAVENOUS CONTINUOUS PRN
Status: DISCONTINUED | OUTPATIENT
Start: 2020-11-09 | End: 2020-11-14 | Stop reason: HOSPADM

## 2020-11-09 RX ORDER — PROPOFOL 10 MG/ML
5-75 INJECTION, EMULSION INTRAVENOUS CONTINUOUS
Status: DISCONTINUED | OUTPATIENT
Start: 2020-11-09 | End: 2020-11-10

## 2020-11-09 RX ORDER — FUROSEMIDE 10 MG/ML
20 INJECTION INTRAMUSCULAR; INTRAVENOUS
Status: COMPLETED | OUTPATIENT
Start: 2020-11-09 | End: 2020-11-10

## 2020-11-09 RX ORDER — PROPOFOL 10 MG/ML
INJECTION, EMULSION INTRAVENOUS PRN
Status: DISCONTINUED | OUTPATIENT
Start: 2020-11-09 | End: 2020-11-09

## 2020-11-09 RX ORDER — HEPARIN SODIUM 1000 [USP'U]/ML
INJECTION, SOLUTION INTRAVENOUS; SUBCUTANEOUS PRN
Status: DISCONTINUED | OUTPATIENT
Start: 2020-11-09 | End: 2020-11-09

## 2020-11-09 RX ORDER — PROPOFOL 10 MG/ML
INJECTION, EMULSION INTRAVENOUS CONTINUOUS PRN
Status: DISCONTINUED | OUTPATIENT
Start: 2020-11-09 | End: 2020-11-09

## 2020-11-09 RX ORDER — CEFAZOLIN SODIUM 2 G/100ML
2 INJECTION, SOLUTION INTRAVENOUS EVERY 8 HOURS
Status: COMPLETED | OUTPATIENT
Start: 2020-11-09 | End: 2020-11-10

## 2020-11-09 RX ORDER — CEFAZOLIN SODIUM 2 G/100ML
2 INJECTION, SOLUTION INTRAVENOUS
Status: COMPLETED | OUTPATIENT
Start: 2020-11-09 | End: 2020-11-09

## 2020-11-09 RX ORDER — METOPROLOL TARTRATE 50 MG
50 TABLET ORAL 2 TIMES DAILY
Status: ON HOLD | COMMUNITY
End: 2020-11-14

## 2020-11-09 RX ORDER — PAPAVERINE HYDROCHLORIDE 30 MG/ML
INJECTION INTRAMUSCULAR; INTRAVENOUS PRN
Status: DISCONTINUED | OUTPATIENT
Start: 2020-11-09 | End: 2020-11-09 | Stop reason: HOSPADM

## 2020-11-09 RX ADMIN — FENTANYL CITRATE 100 MCG: 50 INJECTION, SOLUTION INTRAMUSCULAR; INTRAVENOUS at 14:08

## 2020-11-09 RX ADMIN — LIDOCAINE HYDROCHLORIDE 100 MG: 20 INJECTION, SOLUTION INFILTRATION; PERINEURAL at 11:07

## 2020-11-09 RX ADMIN — OXYCODONE HYDROCHLORIDE 10 MG: 5 TABLET ORAL at 20:30

## 2020-11-09 RX ADMIN — SUGAMMADEX 200 MG: 100 INJECTION, SOLUTION INTRAVENOUS at 15:55

## 2020-11-09 RX ADMIN — NITROGLYCERIN 20 MCG: 5 INJECTION, SOLUTION INTRAVENOUS at 14:09

## 2020-11-09 RX ADMIN — NITROGLYCERIN 20 MCG: 5 INJECTION, SOLUTION INTRAVENOUS at 12:12

## 2020-11-09 RX ADMIN — NITROGLYCERIN 20 MCG: 5 INJECTION, SOLUTION INTRAVENOUS at 12:17

## 2020-11-09 RX ADMIN — FENTANYL CITRATE 50 MCG: 50 INJECTION, SOLUTION INTRAMUSCULAR; INTRAVENOUS at 12:23

## 2020-11-09 RX ADMIN — FENTANYL CITRATE 50 MCG: 50 INJECTION, SOLUTION INTRAMUSCULAR; INTRAVENOUS at 18:10

## 2020-11-09 RX ADMIN — BUSPIRONE HYDROCHLORIDE 10 MG: 10 TABLET ORAL at 22:07

## 2020-11-09 RX ADMIN — MIDAZOLAM HYDROCHLORIDE 1 MG: 1 INJECTION, SOLUTION INTRAMUSCULAR; INTRAVENOUS at 12:42

## 2020-11-09 RX ADMIN — PROPOFOL 20 MG: 10 INJECTION, EMULSION INTRAVENOUS at 12:21

## 2020-11-09 RX ADMIN — NITROGLYCERIN 40 MCG: 5 INJECTION, SOLUTION INTRAVENOUS at 14:13

## 2020-11-09 RX ADMIN — NITROGLYCERIN 80 MCG/KG/MIN: 20 INJECTION INTRAVENOUS at 10:54

## 2020-11-09 RX ADMIN — SODIUM CHLORIDE 6 UNITS/HR: 9 INJECTION, SOLUTION INTRAVENOUS at 20:06

## 2020-11-09 RX ADMIN — FENTANYL CITRATE 100 MCG: 50 INJECTION, SOLUTION INTRAMUSCULAR; INTRAVENOUS at 12:18

## 2020-11-09 RX ADMIN — POTASSIUM CHLORIDE, DEXTROSE MONOHYDRATE AND SODIUM CHLORIDE: 150; 5; 450 INJECTION, SOLUTION INTRAVENOUS at 16:12

## 2020-11-09 RX ADMIN — PANTOPRAZOLE SODIUM 40 MG: 40 INJECTION, POWDER, FOR SOLUTION INTRAVENOUS at 16:39

## 2020-11-09 RX ADMIN — VECURONIUM BROMIDE 2 MG: 1 INJECTION, POWDER, LYOPHILIZED, FOR SOLUTION INTRAVENOUS at 13:58

## 2020-11-09 RX ADMIN — NITROGLYCERIN 20 MCG: 5 INJECTION, SOLUTION INTRAVENOUS at 12:11

## 2020-11-09 RX ADMIN — NITROGLYCERIN 80 MCG/MIN: 20 INJECTION INTRAVENOUS at 06:47

## 2020-11-09 RX ADMIN — NITROGLYCERIN 30 MCG: 5 INJECTION, SOLUTION INTRAVENOUS at 12:53

## 2020-11-09 RX ADMIN — FAMOTIDINE 20 MG: 20 TABLET ORAL at 05:27

## 2020-11-09 RX ADMIN — DOCUSATE SODIUM 50 MG AND SENNOSIDES 8.6 MG 1 TABLET: 8.6; 5 TABLET, FILM COATED ORAL at 20:31

## 2020-11-09 RX ADMIN — HEPARIN SODIUM 40000 UNITS: 1000 INJECTION INTRAVENOUS; SUBCUTANEOUS at 12:40

## 2020-11-09 RX ADMIN — PHENYLEPHRINE HYDROCHLORIDE 100 MCG: 10 INJECTION INTRAVENOUS at 11:07

## 2020-11-09 RX ADMIN — FENTANYL CITRATE 50 MCG: 50 INJECTION, SOLUTION INTRAMUSCULAR; INTRAVENOUS at 12:11

## 2020-11-09 RX ADMIN — OXYCODONE HYDROCHLORIDE 10 MG: 5 TABLET ORAL at 17:12

## 2020-11-09 RX ADMIN — SODIUM CHLORIDE 2.5 UNITS/HR: 9 INJECTION, SOLUTION INTRAVENOUS at 16:26

## 2020-11-09 RX ADMIN — FENTANYL CITRATE 250 MCG: 50 INJECTION, SOLUTION INTRAMUSCULAR; INTRAVENOUS at 11:07

## 2020-11-09 RX ADMIN — NITROGLYCERIN 20 MCG: 5 INJECTION, SOLUTION INTRAVENOUS at 14:08

## 2020-11-09 RX ADMIN — CEFAZOLIN SODIUM 1 G: 2 INJECTION, SOLUTION INTRAVENOUS at 13:42

## 2020-11-09 RX ADMIN — PROPOFOL 10 MG: 10 INJECTION, EMULSION INTRAVENOUS at 12:28

## 2020-11-09 RX ADMIN — NITROGLYCERIN 30 MCG/MIN: 20 INJECTION INTRAVENOUS at 14:12

## 2020-11-09 RX ADMIN — BUSPIRONE HYDROCHLORIDE 10 MG: 10 TABLET ORAL at 18:47

## 2020-11-09 RX ADMIN — CEFAZOLIN SODIUM 2 G: 2 INJECTION, SOLUTION INTRAVENOUS at 22:07

## 2020-11-09 RX ADMIN — METHOCARBAMOL TABLETS 750 MG: 750 TABLET, COATED ORAL at 19:37

## 2020-11-09 RX ADMIN — NITROGLYCERIN 20 MCG: 5 INJECTION, SOLUTION INTRAVENOUS at 12:15

## 2020-11-09 RX ADMIN — CEFAZOLIN SODIUM 2 G: 2 INJECTION, SOLUTION INTRAVENOUS at 11:42

## 2020-11-09 RX ADMIN — MIDAZOLAM HYDROCHLORIDE 2 MG: 1 INJECTION, SOLUTION INTRAMUSCULAR; INTRAVENOUS at 11:00

## 2020-11-09 RX ADMIN — NITROGLYCERIN 20 MCG: 5 INJECTION, SOLUTION INTRAVENOUS at 12:04

## 2020-11-09 RX ADMIN — NITROGLYCERIN 20 MCG: 5 INJECTION, SOLUTION INTRAVENOUS at 12:56

## 2020-11-09 RX ADMIN — FENTANYL CITRATE 100 MCG: 50 INJECTION, SOLUTION INTRAMUSCULAR; INTRAVENOUS at 11:27

## 2020-11-09 RX ADMIN — SODIUM CHLORIDE 2.5 MG/HR: 900 INJECTION, SOLUTION INTRAVENOUS at 16:30

## 2020-11-09 RX ADMIN — NITROGLYCERIN 20 MCG: 5 INJECTION, SOLUTION INTRAVENOUS at 11:13

## 2020-11-09 RX ADMIN — PROPOFOL 50 MG: 10 INJECTION, EMULSION INTRAVENOUS at 11:07

## 2020-11-09 RX ADMIN — FENTANYL CITRATE 50 MCG: 50 INJECTION, SOLUTION INTRAMUSCULAR; INTRAVENOUS at 12:14

## 2020-11-09 RX ADMIN — NITROGLYCERIN 20 MCG: 5 INJECTION, SOLUTION INTRAVENOUS at 12:43

## 2020-11-09 RX ADMIN — NITROGLYCERIN 40 MCG: 5 INJECTION, SOLUTION INTRAVENOUS at 12:19

## 2020-11-09 RX ADMIN — ALBUMIN HUMAN: 0.05 INJECTION, SOLUTION INTRAVENOUS at 11:30

## 2020-11-09 RX ADMIN — SODIUM CHLORIDE, POTASSIUM CHLORIDE, SODIUM LACTATE AND CALCIUM CHLORIDE: 600; 310; 30; 20 INJECTION, SOLUTION INTRAVENOUS at 11:30

## 2020-11-09 RX ADMIN — PROTAMINE SULFATE 250 MG: 10 INJECTION, SOLUTION INTRAVENOUS at 14:11

## 2020-11-09 RX ADMIN — AMINOCAPROIC ACID 5 G/HR: 250 INJECTION, SOLUTION INTRAVENOUS at 11:43

## 2020-11-09 RX ADMIN — NITROGLYCERIN 20 MCG: 5 INJECTION, SOLUTION INTRAVENOUS at 11:16

## 2020-11-09 RX ADMIN — FENTANYL CITRATE 150 MCG: 50 INJECTION, SOLUTION INTRAMUSCULAR; INTRAVENOUS at 14:11

## 2020-11-09 RX ADMIN — VECURONIUM BROMIDE 5 MG: 1 INJECTION, POWDER, LYOPHILIZED, FOR SOLUTION INTRAVENOUS at 11:53

## 2020-11-09 RX ADMIN — VECURONIUM BROMIDE 5 MG: 1 INJECTION, POWDER, LYOPHILIZED, FOR SOLUTION INTRAVENOUS at 12:42

## 2020-11-09 RX ADMIN — KETOROLAC TROMETHAMINE 15 MG: 15 INJECTION, SOLUTION INTRAMUSCULAR; INTRAVENOUS at 17:34

## 2020-11-09 RX ADMIN — PROPOFOL 20 MG: 10 INJECTION, EMULSION INTRAVENOUS at 11:21

## 2020-11-09 RX ADMIN — MUPIROCIN: 20 OINTMENT TOPICAL at 05:27

## 2020-11-09 RX ADMIN — PROPOFOL 30 MG: 10 INJECTION, EMULSION INTRAVENOUS at 11:15

## 2020-11-09 RX ADMIN — SODIUM CHLORIDE, POTASSIUM CHLORIDE, SODIUM LACTATE AND CALCIUM CHLORIDE: 600; 310; 30; 20 INJECTION, SOLUTION INTRAVENOUS at 10:54

## 2020-11-09 RX ADMIN — MIDAZOLAM HYDROCHLORIDE 1 MG: 1 INJECTION, SOLUTION INTRAMUSCULAR; INTRAVENOUS at 11:52

## 2020-11-09 RX ADMIN — PROPOFOL 50 MG: 10 INJECTION, EMULSION INTRAVENOUS at 11:10

## 2020-11-09 RX ADMIN — NITROGLYCERIN 40 MCG: 5 INJECTION, SOLUTION INTRAVENOUS at 14:10

## 2020-11-09 RX ADMIN — GABAPENTIN 300 MG: 300 CAPSULE ORAL at 20:31

## 2020-11-09 RX ADMIN — VECURONIUM BROMIDE 10 MG: 1 INJECTION, POWDER, LYOPHILIZED, FOR SOLUTION INTRAVENOUS at 11:07

## 2020-11-09 RX ADMIN — METOPROLOL TARTRATE 25 MG: 25 TABLET, FILM COATED ORAL at 05:27

## 2020-11-09 RX ADMIN — NITROGLYCERIN 20 MCG: 5 INJECTION, SOLUTION INTRAVENOUS at 14:07

## 2020-11-09 RX ADMIN — FENTANYL CITRATE 50 MCG: 50 INJECTION, SOLUTION INTRAMUSCULAR; INTRAVENOUS at 12:13

## 2020-11-09 RX ADMIN — NITROGLYCERIN 20 MCG: 5 INJECTION, SOLUTION INTRAVENOUS at 12:51

## 2020-11-09 RX ADMIN — EPINEPHRINE 0.03 MCG/KG/MIN: 1 INJECTION INTRAMUSCULAR; INTRAVENOUS; SUBCUTANEOUS at 13:48

## 2020-11-09 RX ADMIN — MIDAZOLAM HYDROCHLORIDE 1 MG: 1 INJECTION, SOLUTION INTRAMUSCULAR; INTRAVENOUS at 14:15

## 2020-11-09 RX ADMIN — ACETAMINOPHEN 975 MG: 325 TABLET, FILM COATED ORAL at 17:12

## 2020-11-09 RX ADMIN — FENTANYL CITRATE 100 MCG: 50 INJECTION, SOLUTION INTRAMUSCULAR; INTRAVENOUS at 11:52

## 2020-11-09 RX ADMIN — POTASSIUM CHLORIDE 20 MEQ: 29.8 INJECTION, SOLUTION INTRAVENOUS at 19:38

## 2020-11-09 RX ADMIN — PROPOFOL 50 MCG/KG/MIN: 10 INJECTION, EMULSION INTRAVENOUS at 14:18

## 2020-11-09 RX ADMIN — NITROGLYCERIN 30 MCG: 5 INJECTION, SOLUTION INTRAVENOUS at 12:54

## 2020-11-09 RX ADMIN — SODIUM CHLORIDE: 9 INJECTION, SOLUTION INTRAVENOUS at 11:20

## 2020-11-09 RX ADMIN — SODIUM CHLORIDE: 9 INJECTION, SOLUTION INTRAVENOUS at 13:29

## 2020-11-09 ASSESSMENT — ACTIVITIES OF DAILY LIVING (ADL)
ADLS_ACUITY_SCORE: 15
ADLS_ACUITY_SCORE: 15

## 2020-11-09 NOTE — PROGRESS NOTES
Extubated pt to 3L NC following 10 SBT at 5/5 50 for 10 minutes, per MD. Pt tolerated well, sats remain in mid 90's. Rate 20's w/ coarse BS. Will continue to monitor.    Elida Ling - RRT

## 2020-11-09 NOTE — ANESTHESIA CARE TRANSFER NOTE
Patient: Adrian Petty    Procedure(s):  CORONARY ARTERY BYPASS GRAFT X 3 (LIMA-LAD, SV-DIAG, SV-PDA), ON PUMP WITH SHAYY READ BY ANESTHESIOLOGIST DR RANGEL.    Diagnosis: CAD (coronary artery disease) [I25.10]  Diagnosis Additional Information: No value filed.    Anesthesia Type:   General     Note:  Airway :ETT  Patient transferred to:ICU  ICU Handoff: Call for PAUSE to initiate/utilize ICU HANDOFF, Identified Patient, Identified Responsible Provider, Reviewed the Pertinent Medical History, Discussed Surgical Course, Reviewed Intra-OP Anesthesia Management and Issues during Anesthesia, Set Expectations for Post Procedure Period and Allowed Opportunity for Questions and Acknowledgement of Understanding      Vitals: (Last set prior to Anesthesia Care Transfer)    CRNA VITALS  11/9/2020 1506 - 11/9/2020 1555      11/9/2020             Resp Rate (observed):  17    Resp Rate (set):  18                Electronically Signed By: COLIN Mariano CRNA  November 9, 2020  3:55 PM

## 2020-11-09 NOTE — ANESTHESIA POSTPROCEDURE EVALUATION
Patient: Adrian Petty    Procedure(s):  CORONARY ARTERY BYPASS GRAFT X 3 (LIMA-LAD, SV-DIAG, SV-PDA), ON PUMP WITH SHAYY READ BY ANESTHESIOLOGIST DR RANGEL.    Diagnosis:CAD (coronary artery disease) [I25.10]  Diagnosis Additional Information: No value filed.    Anesthesia Type:  General    Note:  Anesthesia Post Evaluation    Patient location during evaluation: ICU  Patient participation: Unable to evaluate secondary to administered sedation  Level of consciousness: obtunded/minimal responses  Pain management: unable to assess  Airway patency: patent  Cardiovascular status: acceptable  Respiratory status: acceptable, intubated, ventilator and ETT  Hydration status: acceptable  PONV: unable to assess       Comments: Stable transport from OR 12 to .  Propofol and nitroglycerin infusing through CVC. O2 via ambu. SBAR to ICU team. Questions answered.    Jake Rangel D.O.  Staff Anesthesiologist  [facility], [facility]          Last vitals:  Vitals:    11/09/20 0800 11/09/20 0900 11/09/20 1032   BP:  138/81 (!) 158/80   Pulse:  70 76   Resp:   17   Temp: 37  C (98.6  F)  36.8  C (98.2  F)   SpO2:   98%         Electronically Signed By: Jake Rangel DO  November 9, 2020  4:12 PM

## 2020-11-09 NOTE — PROGRESS NOTES
A&O pleasant cooperative. Nitroglycerine and Heparin gtt. Up SBA. Plan for CABG tomorrow, 11/9, first case. Pre-surgical scrub completed. Tylenol for 1/10 headache pain. Continue to monitor.

## 2020-11-09 NOTE — ANESTHESIA PROCEDURE NOTES
Airway   Date/Time: 11/9/2020 11:11 AM   Patient location during procedure: OR    Staff -   Anesthesiologist:  Jake Penn DO  CRNA: Neris Cui APRN CRNA  Performed By: anesthesiologist and CRNA    Consent for Airway   Urgency: elective    Indications and Patient Condition  Indications for airway management: britta-procedural  Induction type:intravenousMask difficulty assessment: 3 - difficult mask (inadequate, unstable, or two providers) +/- NMBA    Final Airway Details  Final airway type: endotracheal airway  Successful airway:ETT - single  Endotracheal Airway Details   ETT size (mm): 8.0  Cuffed: yes  Successful intubation technique: video laryngoscopy  Grade View of Cords: 1  Adjucts: stylet  Measured from: gums/teeth  Secured at (cm): 23  Secured with: pink tape  Bite block used: None    Post intubation assessment   Placement verified by: capnometry, equal breath sounds and chest rise   Number of attempts at approach: 1  Number of other approaches attempted: 0  Secured with:pink tape  Ease of procedure: easy  Dentition: Intact and Unchanged

## 2020-11-09 NOTE — BRIEF OP NOTE
[facility]    Brief Operative Note    Pre-operative diagnosis: CAD (coronary artery disease) [I25.10]  Post-operative diagnosis Same as pre-operative diagnosis    Procedure: Procedure(s):  CORONARY ARTERY BYPASS GRAFT X 3 (LIMA-LAD, SV-DIAG, SV-PDA), ON PUMP WITH SHAYY READ BY ANESTHESIOLOGIST DR RANGEL.  Surgeon: Surgeon(s) and Role:     * Leonardo Herrera MD - Primary     * Juan Jones PA-C - Assisting  Anesthesia: General   Estimated blood loss: 450 ml  Drains: 2 straight chest tubes, 1 in L pleural, 1 mediastinum, 1 ventricular wire,   Specimens: * No specimens in log *  Findings:   None.  Complications: None.  Implants: * No implants in log *      R pleural space is open

## 2020-11-09 NOTE — ANESTHESIA CARE TRANSFER NOTE
Patient: Adrian Petty    Procedure(s):  CORONARY ARTERY BYPASS GRAFT X 3 (LIMA-LAD, SV-DIAG, SV-PDA), ON PUMP WITH SHAYY READ BY ANESTHESIOLOGIST DR RANGEL.    Diagnosis: CAD (coronary artery disease) [I25.10]  Diagnosis Additional Information: No value filed.    Anesthesia Type:   General     Note:  Anesthesia Care Transfer Notewriter    Vitals: (Last set prior to Anesthesia Care Transfer)    CRNA VITALS  11/9/2020 1506 - 11/9/2020 1555      11/9/2020             Resp Rate (observed):  17    Resp Rate (set):  18                Electronically Signed By: COLIN Mariano CRNA  November 9, 2020  3:55 PM

## 2020-11-09 NOTE — OP NOTE
Procedure Date: 11/09/2020      REFERRING CARDIOLOGIST:  Andrea Mendes MD      PREOPERATIVE DIAGNOSIS:  Severe multivessel coronary artery disease, non-ST elevation myocardial infarction.      POSTOPERATIVE DIAGNOSIS:  Severe multivessel coronary artery disease, non-ST elevation myocardial infarction.     SURGEON:  Leonardo Herrera MD      ASSISTANT:  CADEN Romero      PROCEDURES PERFORMED:  Coronary artery bypass grafting x3 with left internal mammary artery to the left anterior descending, reverse saphenous vein graft to the posterior descending artery, reverse saphenous vein graft to the first diagonal artery, endoscopic vein harvest from the left lower extremity, intraoperative SHAYY.      ANESTHESIA:  General endotracheal.      INDICATIONS FOR PROCEDURE:  Mr. Petty is a very pleasant 60-year-old gentleman who was admitted to the hospital last week with chest pain.  He was diagnosed with non-ST elevation MI and a coronary angiogram demonstrated severe multivessel coronary artery disease including a chronically totally occluded PDA, significant disease involving the diagonal arteries and severe LAD disease.  His circumflex coronary distribution was very small.  He was started on nitroglycerin drip and Heparin drip over the weekend and was taken to the operating room today for coronary bypass surgery.      OPERATIVE FINDINGS:  The patient had an overall normal LV systolic size and function.  His posterior descending artery was on the small side, but it had minimal disease.  The probe size was 1 mm.  The patient did not have a large enough left circumflex coronary distribution for bypass grafting.  We grafted the medial branch of the first diagonal artery.  This had mild disease.  The probe size was 1.5 mm.  The mid to distal LAD had mild disease and the probe size was 1.5 mm as well.      DESCRIPTION OF PROCEDURE:  After informed consent was obtained, the patient was brought down to the operating room and was  placed on the OR table in a supine position.  Intravenous and intra-arterial lines were begun.  While monitoring his blood pressure and EKG tracing, he was anesthetized and intubated using a single lumen endotracheal tube.  His entire chest, abdomen, both groins and legs were prepped down to the toes using multiple layers of DuraPrep and he was draped in sterile field.  Median sternotomy was performed and in the meantime, the left greater saphenous vein was harvested endoscopically.  The left internal mammary artery was taken down.  Prior to clipping the LIMA distally, the patient was fully heparinized.  The sternal edges were retracted laterally and the pericardium was opened to suspend the heart in a pericardial cradle.  The ascending aorta and the right atrial appendage were cannulated.  A retrograde cardioplegia catheter was placed into the coronary sinus without difficulty.  An antegrade needle/aortic root vent was placed in the ascending aorta as well.  After appropriate ACT level was achieved, cardiopulmonary bypass was established and the patient was kept normothermic during the entire operation.  The aorta was then crossclamped and antegrade cold blood cardioplegia was given to fully arrest the heart.  The patient went into good diastolic arrest without any LV distention.  Following this, intermittent retrograde cardioplegia dose was given on average of 15 minutes for myocardial protection while the aorta was crossclamped.      The first coronary vessel grafted was the posterior descending artery.  Conduit used was a saphenous vein.  This anastomosis was performed in an end-to-side fashion using running 7-0 Prolene.  Next, the diagonal artery was grafted.  Another saphenous vein was used as a conduit. This anastomosis was performed in an end-to-side fashion using running 7-0 Prolene.  Next, the LIMA to LAD anastomosis was performed. This was also done in an end-to-side fashion using running 7-0 Prolene.   This anastomosis was protected by tacking the LIMA and pedicle down to the epicardium using interrupted 6-0 Prolene x2.  A retrograde hot shot was given and the aortic crossclamp was removed.  Aortic crossclamp time was 44 minutes.  Partial clamp was placed in the mid ascending aorta.  Two 4-mm aortotomies were created to perform the 2 proximal vein anastomoses in an end-to-side fashion using running 6-0 Prolene.  Partial clamp was removed.  The patient was weaned off cardiopulmonary bypass with low-dose epinephrine and Campos-Synephrine drips.  Total cardiopulmonary bypass time was 64 minutes.  LV function was good.  There was small bleeding around the toe of the distal LIMA to LAD anastomosis, which we repaired using a single 7-0 Prolene repair suture while stabilizing the heart with a Medtronic Octopus stabilizer.  Once the patient remained stable off bypass, the venous cannula was removed and protamine was given.  The aortic cannula was subsequently removed as well.  Temporary ventricular pacing wires placed in the RV muscle and 32-Aust[place] straight chest tubes were placed in mediastinum as well.  These were all brought out percutaneously below the sternotomy incision, secured to skin using 2-0 Ethibond.  The right pleural space was slightly open.  The mediastinum was irrigated with antibiotic saline and hemostasis was achieved.  The sternum was reapproximated using multiple interrupted single and double wires.  The incision was closed in layers of running Vicryl suture.  Skin was closed using 3-0 Vicryl and was sealed using Dermabond.      There were no intraoperative complications and the patient tolerated the operation well.  No blood products were given intraoperatively.  All sponge counts, needle counts and instrument counts were correct x2 at the end the operation.      ESTIMATED BLOOD LOSS:  Unknown.      SPECIMEN REMOVED:  None.      The patient was brought to the ICU in hemodynamically stable condition.          MOHAMUD GUIDO MD             D: 2020   T: 2020   MT: TACOS      Name:     ADRIANA AGUILERA   MRN:      [id number]        Account:        [id number]   :      1960           Procedure Date: 2020      Document: [id number]

## 2020-11-09 NOTE — PLAN OF CARE
Pt stable over night, VSS, no c/o Chest pain or sob, on a NTG gtt.  Pt is NPO for possible CABG today.  Heparin stopped at 0510.  Pre-op Med's given.  Pre-op called at 0530 and Pt is to remain in  until further notice.  No new issues noted, will continue to monitor.

## 2020-11-09 NOTE — ANESTHESIA PROCEDURE NOTES
ARTERIAL LINE PROCEDURE NOTE:      Staff -   Anesthesiologist:  Jake Penn DO  Performed By: anesthesiologist   Pre-Procedure  Performed by Jake Penn DO  Location: OR      Pre-Anesthestic Checklist: patient identified, IV checked, site marked, risks and benefits discussed, informed consent, monitors and equipment checked, pre-op evaluation and at physician/surgeon's request    Timeout  Correct Patient: Yes   Correct Procedure: Yes   Correct Site: Yes   Correct Laterality: Yes   Correct Position: Yes   Site Marked: Yes, N/A   .   Procedure Documentation  Procedure: arterial line  ASA 4  Supine  Insertion Site:radial.Skin infiltrated with 2 mL of 1% lidocaine. Injection technique: Seldinger Technique  .  .  Patient Prep/Sterile Barriers; all elements of maximal sterile barrier technique followed, mask, hat, sterile gown, sterile gloves, draped, hand hygiene, chlorhexidine gluconate and isopropyl alcohol    Assessment/Narrative    Catheter: 20 gauge, 1.75 in/4.5 cm quick cath (integral wire)      Tegaderm dressing used.    Arterial waveform: Yes IBP within 10% of NIBP: Yes

## 2020-11-09 NOTE — ANESTHESIA PROCEDURE NOTES
Acute Normovolemic Hemodilution Note:       Staff -   Anesthesiologist:  Jake Penn DO  CRNA: Neris Cui APRN CRNA  Other Anesthesia Staff: Jake Penn DO  Performed By: anesthesiologist and CRNA  Pre-Procedure  Performed by Jake Penn DO  Location: In OR after induction      Pre-Anesthestic Checklist: patient identified, IV checked, site marked, risks and benefits discussed, informed consent, monitors and equipment checked, pre-op evaluation and at physician/surgeon's request    .   Procedure; Normovolemic Hemodilution  Date/Time of Collection:  11/9/2020 11:30 AM  Date/Time of Expiration:11/9/2020 7:30 PM  Volume Collected: 750 ml    Provider Collecting: Jake Penn DO

## 2020-11-09 NOTE — ANESTHESIA PREPROCEDURE EVALUATION
Anesthesia Pre-Procedure Evaluation    Patient: Adrian Petty   MRN: [id number] : 1960          Preoperative Diagnosis: CAD (coronary artery disease) [I25.10]    Procedure(s):  CORONARY ARTERY BYPASS GRAFT (CABG)    Past Medical History:   Diagnosis Date     Arthritis      Cancer (H)     prostate     Diabetes (H)      Heart disease 2012    stents     Hypertension      Past Surgical History:   Procedure Laterality Date     BACK SURGERY       COLONOSCOPY       CV HEART CATHETERIZATION WITH POSSIBLE INTERVENTION N/A 2020    Procedure: Heart Catheterization with Possible Intervention with RETAAPPER - Schedule at 10:30 AM on ;  Surgeon: Ted Haider MD;  Location:  HEART CARDIAC CATH LAB     ORTHOPEDIC SURGERY         Anesthesia Evaluation     . Pt has had prior anesthetic.            ROS/MED HX    ENT/Pulmonary:       Neurologic:       Cardiovascular:     (+) Dyslipidemia, hypertension--CAD, angina-past MI,-stent,. : . . . :. . Previous cardiac testing Echodate:esults:Interpretation Summary     1. Normal left ventricular size and systolic function. LVEF 60-65%.  2. No regional wall motion abnormalities.  3. Normal right ventricular size and systolic function.  4. No significant valve disease.     There is no comparison study available.date: results: date: results:Cath date: results:Coronary angiography brief summary  Left main: Minimal disease  LAD: Diffuse mild disease throughout.  In-stent restenosis at mid LAD.  Distal LAD has severe disease.  D1 has a large territory with branching into 2.  It is with severe disease.  LCx: Small territory.    RCA: Dominant vessel.  Diffuse mild disease.  It gives rise to large PL branch.  RPDA is .  Unclear with the bifurcation.  Collateralized from LAD apical.     Conclusions  -Three-vessel coronary artery disease with right PDA , diagonal 1, and mid LAD ISR.  -Diabetes mellitus          METS/Exercise Tolerance:     Hematologic:  "        Musculoskeletal:   (+) arthritis,  -       GI/Hepatic:         Renal/Genitourinary:         Endo:     (+) type II DM Last HgA1c: 8.1 date: 11/4/20 Not using insulin .      Psychiatric:         Infectious Disease:         Malignancy:   (+) Malignancy History of Prostate  Prostate CA Remission status post Surgery, Chemo and Radiation,         Other:                          Physical Exam  Normal systems: cardiovascular, pulmonary and dental    Airway   Mallampati: II  TM distance: >3 FB  Neck ROM: full    Dental     Cardiovascular       Pulmonary             Lab Results   Component Value Date    WBC 9.0 11/06/2020    HGB 15.1 11/06/2020    HCT 44.3 11/06/2020     11/06/2020     11/06/2020    POTASSIUM 3.7 11/08/2020    CHLORIDE 105 11/06/2020    CO2 27 11/06/2020    BUN 17 11/06/2020    CR 0.83 11/06/2020     (H) 11/06/2020    NADEEN 8.7 11/06/2020    ALBUMIN 4.2 11/05/2020    PROTTOTAL 8.0 11/05/2020    ALT 36 11/05/2020    AST 19 11/05/2020    ALKPHOS 61 11/05/2020    BILITOTAL 0.7 11/05/2020    PTT 26 11/06/2020    INR 1.07 11/05/2020    TSH 0.84 11/04/2020       Preop Vitals  BP Readings from Last 3 Encounters:   11/08/20 (!) 151/84   11/03/20 (!) 195/108    Pulse Readings from Last 3 Encounters:   11/08/20 99   11/03/20 90      Resp Readings from Last 3 Encounters:   11/08/20 15    SpO2 Readings from Last 3 Encounters:   11/08/20 96%   11/03/20 99%      Temp Readings from Last 1 Encounters:   11/08/20 37.2  C (99  F) (Oral)    Ht Readings from Last 1 Encounters:   11/03/20 1.778 m (5' 10\")      Wt Readings from Last 1 Encounters:   11/08/20 104.6 kg (230 lb 9.6 oz)    Estimated body mass index is 33.09 kg/m  as calculated from the following:    Height as of 11/3/20: 1.778 m (5' 10\").    Weight as of this encounter: 104.6 kg (230 lb 9.6 oz).       Anesthesia Plan      History & Physical Review  History and physical reviewed and following examination; no interval change.    ASA Status:  4 . "    NPO Status:  > 8 hours    Plan for General with Intravenous and Propofol induction.     Additional equipment: Videolaryngoscope, 2nd IV, Arterial Line, Central Line, CVP and SHAYY PIV x 2, Art line, CVC (no swan), ETT, SHAYY    Plan to take off 2 units HANS   Patient was instructed to abstain from smoking on day of procedureThe patient is not a current smoker  and patient did not smoke on day of surgery     Postoperative Care  Postoperative pain management:  IV analgesics and Multi-modal analgesia.      Consents  Anesthetic plan, risks, benefits and alternatives discussed with:  Patient.  Use of blood products discussed: Yes.   Use of blood products discussed with Patient.  Consented to blood products.  .                 Jake Penn, DO

## 2020-11-10 ENCOUNTER — APPOINTMENT (OUTPATIENT)
Dept: PHYSICAL THERAPY | Facility: CLINIC | Age: 60
End: 2020-11-10
Attending: PHYSICIAN ASSISTANT
Payer: COMMERCIAL

## 2020-11-10 ENCOUNTER — APPOINTMENT (OUTPATIENT)
Dept: GENERAL RADIOLOGY | Facility: CLINIC | Age: 60
End: 2020-11-10
Attending: PHYSICIAN ASSISTANT
Payer: COMMERCIAL

## 2020-11-10 LAB
ALBUMIN SERPL-MCNC: 3.3 G/DL (ref 3.4–5)
ALP SERPL-CCNC: 43 U/L (ref 40–150)
ALT SERPL W P-5'-P-CCNC: 35 U/L (ref 0–70)
ANION GAP SERPL CALCULATED.3IONS-SCNC: 3 MMOL/L (ref 3–14)
AST SERPL W P-5'-P-CCNC: 36 U/L (ref 0–45)
BASE DEFICIT BLDA-SCNC: 2.1 MMOL/L
BASE DEFICIT BLDA-SCNC: 4.6 MMOL/L
BASE DEFICIT BLDV-SCNC: 0.4 MMOL/L
BASE EXCESS BLDA CALC-SCNC: 0.4 MMOL/L
BILIRUB SERPL-MCNC: 0.9 MG/DL (ref 0.2–1.3)
BLD PROD TYP BPU: NORMAL
BLD PROD TYP BPU: NORMAL
BLD UNIT ID BPU: 0
BLD UNIT ID BPU: 0
BLOOD PRODUCT CODE: NORMAL
BLOOD PRODUCT CODE: NORMAL
BPU ID: NORMAL
BPU ID: NORMAL
BUN SERPL-MCNC: 11 MG/DL (ref 7–30)
CA-I BLD-MCNC: 4.1 MG/DL (ref 4.4–5.2)
CA-I BLD-MCNC: 4.2 MG/DL (ref 4.4–5.2)
CA-I BLD-MCNC: 4.4 MG/DL (ref 4.4–5.2)
CA-I BLD-MCNC: 4.4 MG/DL (ref 4.4–5.2)
CA-I BLD-MCNC: 4.8 MG/DL (ref 4.4–5.2)
CALCIUM SERPL-MCNC: 7.8 MG/DL (ref 8.5–10.1)
CHLORIDE SERPL-SCNC: 108 MMOL/L (ref 94–109)
CO2 SERPL-SCNC: 25 MMOL/L (ref 20–32)
CREAT SERPL-MCNC: 0.76 MG/DL (ref 0.66–1.25)
ERYTHROCYTE [DISTWIDTH] IN BLOOD BY AUTOMATED COUNT: 13.7 % (ref 10–15)
GFR SERPL CREATININE-BSD FRML MDRD: >90 ML/MIN/{1.73_M2}
GLUCOSE BLD-MCNC: 194 MG/DL (ref 70–99)
GLUCOSE BLD-MCNC: 211 MG/DL (ref 70–99)
GLUCOSE BLD-MCNC: 215 MG/DL (ref 70–99)
GLUCOSE BLD-MCNC: 231 MG/DL (ref 70–99)
GLUCOSE BLDC GLUCOMTR-MCNC: 104 MG/DL (ref 70–99)
GLUCOSE BLDC GLUCOMTR-MCNC: 114 MG/DL (ref 70–99)
GLUCOSE BLDC GLUCOMTR-MCNC: 121 MG/DL (ref 70–99)
GLUCOSE BLDC GLUCOMTR-MCNC: 128 MG/DL (ref 70–99)
GLUCOSE BLDC GLUCOMTR-MCNC: 131 MG/DL (ref 70–99)
GLUCOSE BLDC GLUCOMTR-MCNC: 140 MG/DL (ref 70–99)
GLUCOSE BLDC GLUCOMTR-MCNC: 142 MG/DL (ref 70–99)
GLUCOSE BLDC GLUCOMTR-MCNC: 160 MG/DL (ref 70–99)
GLUCOSE BLDC GLUCOMTR-MCNC: 162 MG/DL (ref 70–99)
GLUCOSE BLDC GLUCOMTR-MCNC: 166 MG/DL (ref 70–99)
GLUCOSE BLDC GLUCOMTR-MCNC: 191 MG/DL (ref 70–99)
GLUCOSE SERPL-MCNC: 132 MG/DL (ref 70–99)
HCO3 BLD-SCNC: 22 MMOL/L (ref 21–28)
HCO3 BLD-SCNC: 24 MMOL/L (ref 21–28)
HCO3 BLD-SCNC: 25 MMOL/L (ref 21–28)
HCO3 BLDV-SCNC: 26 MMOL/L (ref 21–28)
HCT VFR BLD AUTO: 39.4 % (ref 40–53)
HGB BLD-MCNC: 10 G/DL (ref 13.3–17.7)
HGB BLD-MCNC: 11.8 G/DL (ref 13.3–17.7)
HGB BLD-MCNC: 13.2 G/DL (ref 13.3–17.7)
HGB BLD-MCNC: 9.5 G/DL (ref 13.3–17.7)
HGB BLD-MCNC: 9.9 G/DL (ref 13.3–17.7)
LACTATE BLD-SCNC: 1.4 MMOL/L (ref 0.7–2)
LACTATE BLD-SCNC: 1.4 MMOL/L (ref 0.7–2)
LACTATE BLD-SCNC: 2.5 MMOL/L (ref 0.7–2)
LACTATE BLD-SCNC: 3.3 MMOL/L (ref 0.7–2)
MAGNESIUM SERPL-MCNC: 2.4 MG/DL (ref 1.6–2.3)
MCH RBC QN AUTO: 29.4 PG (ref 26.5–33)
MCHC RBC AUTO-ENTMCNC: 33.5 G/DL (ref 31.5–36.5)
MCV RBC AUTO: 88 FL (ref 78–100)
O2/TOTAL GAS SETTING VFR VENT: 100 %
O2/TOTAL GAS SETTING VFR VENT: 100 %
O2/TOTAL GAS SETTING VFR VENT: 75 %
O2/TOTAL GAS SETTING VFR VENT: 75 %
OXYHGB MFR BLD: 84 % (ref 92–100)
OXYHGB MFR BLD: >100 % (ref 92–100)
PCO2 BLD: 32 MM HG (ref 35–45)
PCO2 BLD: 49 MM HG (ref 35–45)
PCO2 BLD: 52 MM HG (ref 35–45)
PCO2 BLDV: 51 MM HG (ref 40–50)
PH BLD: 7.27 PH (ref 7.35–7.45)
PH BLD: 7.29 PH (ref 7.35–7.45)
PH BLD: 7.47 PH (ref 7.35–7.45)
PH BLDV: 7.32 PH (ref 7.32–7.43)
PHOSPHATE SERPL-MCNC: 3.1 MG/DL (ref 2.5–4.5)
PLATELET # BLD AUTO: 194 10E9/L (ref 150–450)
PO2 BLD: 240 MM HG (ref 80–105)
PO2 BLD: 299 MM HG (ref 80–105)
PO2 BLD: 456 MM HG (ref 80–105)
PO2 BLDV: 49 MM HG (ref 25–47)
POTASSIUM BLD-SCNC: 3.2 MMOL/L (ref 3.4–5.3)
POTASSIUM BLD-SCNC: 3.2 MMOL/L (ref 3.4–5.3)
POTASSIUM BLD-SCNC: 3.7 MMOL/L (ref 3.4–5.3)
POTASSIUM BLD-SCNC: 3.7 MMOL/L (ref 3.4–5.3)
POTASSIUM SERPL-SCNC: 3.8 MMOL/L (ref 3.4–5.3)
PROT SERPL-MCNC: 6 G/DL (ref 6.8–8.8)
RBC # BLD AUTO: 4.49 10E12/L (ref 4.4–5.9)
SODIUM BLD-SCNC: 136 MMOL/L (ref 133–144)
SODIUM BLD-SCNC: 137 MMOL/L (ref 133–144)
SODIUM BLD-SCNC: 137 MMOL/L (ref 133–144)
SODIUM BLD-SCNC: 139 MMOL/L (ref 133–144)
SODIUM SERPL-SCNC: 136 MMOL/L (ref 133–144)
TRANSFUSION STATUS PATIENT QL: NORMAL
WBC # BLD AUTO: 15.5 10E9/L (ref 4–11)

## 2020-11-10 PROCEDURE — 93005 ELECTROCARDIOGRAM TRACING: CPT

## 2020-11-10 PROCEDURE — 97110 THERAPEUTIC EXERCISES: CPT | Mod: GP | Performed by: PHYSICAL THERAPIST

## 2020-11-10 PROCEDURE — 120N000001 HC R&B MED SURG/OB

## 2020-11-10 PROCEDURE — 84100 ASSAY OF PHOSPHORUS: CPT | Performed by: PHYSICIAN ASSISTANT

## 2020-11-10 PROCEDURE — 99232 SBSQ HOSP IP/OBS MODERATE 35: CPT | Performed by: INTERNAL MEDICINE

## 2020-11-10 PROCEDURE — 82330 ASSAY OF CALCIUM: CPT | Performed by: PHYSICIAN ASSISTANT

## 2020-11-10 PROCEDURE — 250N000013 HC RX MED GY IP 250 OP 250 PS 637: Performed by: PHYSICIAN ASSISTANT

## 2020-11-10 PROCEDURE — 250N000012 HC RX MED GY IP 250 OP 636 PS 637: Performed by: PHYSICIAN ASSISTANT

## 2020-11-10 PROCEDURE — 999N001017 HC STATISTIC GLUCOSE BY METER IP

## 2020-11-10 PROCEDURE — C9113 INJ PANTOPRAZOLE SODIUM, VIA: HCPCS | Performed by: PHYSICIAN ASSISTANT

## 2020-11-10 PROCEDURE — 250N000013 HC RX MED GY IP 250 OP 250 PS 637: Performed by: SURGERY

## 2020-11-10 PROCEDURE — 80053 COMPREHEN METABOLIC PANEL: CPT | Performed by: PHYSICIAN ASSISTANT

## 2020-11-10 PROCEDURE — 250N000009 HC RX 250: Performed by: PHYSICIAN ASSISTANT

## 2020-11-10 PROCEDURE — 97161 PT EVAL LOW COMPLEX 20 MIN: CPT | Mod: GP | Performed by: PHYSICAL THERAPIST

## 2020-11-10 PROCEDURE — 250N000011 HC RX IP 250 OP 636: Performed by: PHYSICIAN ASSISTANT

## 2020-11-10 PROCEDURE — 85027 COMPLETE CBC AUTOMATED: CPT | Performed by: PHYSICIAN ASSISTANT

## 2020-11-10 PROCEDURE — 93010 ELECTROCARDIOGRAM REPORT: CPT | Performed by: INTERNAL MEDICINE

## 2020-11-10 PROCEDURE — 71045 X-RAY EXAM CHEST 1 VIEW: CPT

## 2020-11-10 PROCEDURE — 250N000013 HC RX MED GY IP 250 OP 250 PS 637: Performed by: INTERNAL MEDICINE

## 2020-11-10 PROCEDURE — 83735 ASSAY OF MAGNESIUM: CPT | Performed by: PHYSICIAN ASSISTANT

## 2020-11-10 PROCEDURE — 97530 THERAPEUTIC ACTIVITIES: CPT | Mod: GP | Performed by: PHYSICAL THERAPIST

## 2020-11-10 PROCEDURE — 258N000003 HC RX IP 258 OP 636: Performed by: PHYSICIAN ASSISTANT

## 2020-11-10 RX ORDER — HEPARIN SODIUM 5000 [USP'U]/.5ML
5000 INJECTION, SOLUTION INTRAVENOUS; SUBCUTANEOUS EVERY 8 HOURS
Status: DISCONTINUED | OUTPATIENT
Start: 2020-11-10 | End: 2020-11-14 | Stop reason: HOSPADM

## 2020-11-10 RX ORDER — METOPROLOL TARTRATE 25 MG/1
25 TABLET, FILM COATED ORAL 2 TIMES DAILY
Status: DISCONTINUED | OUTPATIENT
Start: 2020-11-10 | End: 2020-11-10

## 2020-11-10 RX ORDER — KETOROLAC TROMETHAMINE 15 MG/ML
15 INJECTION, SOLUTION INTRAMUSCULAR; INTRAVENOUS EVERY 6 HOURS
Status: DISPENSED | OUTPATIENT
Start: 2020-11-10 | End: 2020-11-11

## 2020-11-10 RX ORDER — POTASSIUM CHLORIDE 1500 MG/1
20 TABLET, EXTENDED RELEASE ORAL ONCE
Status: COMPLETED | OUTPATIENT
Start: 2020-11-10 | End: 2020-11-10

## 2020-11-10 RX ORDER — METOPROLOL TARTRATE 25 MG/1
25 TABLET, FILM COATED ORAL 2 TIMES DAILY
Status: DISCONTINUED | OUTPATIENT
Start: 2020-11-10 | End: 2020-11-11

## 2020-11-10 RX ORDER — PANTOPRAZOLE SODIUM 40 MG/1
40 TABLET, DELAYED RELEASE ORAL
Status: DISCONTINUED | OUTPATIENT
Start: 2020-11-11 | End: 2020-11-14 | Stop reason: HOSPADM

## 2020-11-10 RX ORDER — HYDROMORPHONE HYDROCHLORIDE 1 MG/ML
.3-.5 INJECTION, SOLUTION INTRAMUSCULAR; INTRAVENOUS; SUBCUTANEOUS
Status: DISCONTINUED | OUTPATIENT
Start: 2020-11-10 | End: 2020-11-14 | Stop reason: HOSPADM

## 2020-11-10 RX ADMIN — ROSUVASTATIN CALCIUM 40 MG: 20 TABLET, FILM COATED ORAL at 08:55

## 2020-11-10 RX ADMIN — BUSPIRONE HYDROCHLORIDE 10 MG: 10 TABLET ORAL at 16:23

## 2020-11-10 RX ADMIN — SODIUM CHLORIDE 7.5 MG/HR: 900 INJECTION, SOLUTION INTRAVENOUS at 00:17

## 2020-11-10 RX ADMIN — SERTRALINE HYDROCHLORIDE 50 MG: 50 TABLET ORAL at 08:55

## 2020-11-10 RX ADMIN — METOPROLOL TARTRATE 25 MG: 25 TABLET, FILM COATED ORAL at 07:27

## 2020-11-10 RX ADMIN — ASPIRIN 325 MG: 325 TABLET, COATED ORAL at 08:55

## 2020-11-10 RX ADMIN — SODIUM CHLORIDE 15 MG/HR: 900 INJECTION, SOLUTION INTRAVENOUS at 07:37

## 2020-11-10 RX ADMIN — SODIUM CHLORIDE 6 UNITS/HR: 9 INJECTION, SOLUTION INTRAVENOUS at 11:56

## 2020-11-10 RX ADMIN — METHOCARBAMOL TABLETS 750 MG: 750 TABLET, COATED ORAL at 20:16

## 2020-11-10 RX ADMIN — HYDRALAZINE HYDROCHLORIDE 10 MG: 20 INJECTION INTRAMUSCULAR; INTRAVENOUS at 06:18

## 2020-11-10 RX ADMIN — FUROSEMIDE 20 MG: 10 INJECTION, SOLUTION INTRAVENOUS at 07:07

## 2020-11-10 RX ADMIN — GABAPENTIN 300 MG: 300 CAPSULE ORAL at 20:15

## 2020-11-10 RX ADMIN — SODIUM CHLORIDE 2 UNITS/HR: 9 INJECTION, SOLUTION INTRAVENOUS at 02:17

## 2020-11-10 RX ADMIN — BUSPIRONE HYDROCHLORIDE 10 MG: 10 TABLET ORAL at 22:18

## 2020-11-10 RX ADMIN — OXYCODONE HYDROCHLORIDE 10 MG: 5 TABLET ORAL at 00:05

## 2020-11-10 RX ADMIN — DOCUSATE SODIUM 50 MG AND SENNOSIDES 8.6 MG 1 TABLET: 8.6; 5 TABLET, FILM COATED ORAL at 08:55

## 2020-11-10 RX ADMIN — KETOROLAC TROMETHAMINE 15 MG: 15 INJECTION, SOLUTION INTRAMUSCULAR; INTRAVENOUS at 17:01

## 2020-11-10 RX ADMIN — METOPROLOL TARTRATE 25 MG: 25 TABLET, FILM COATED ORAL at 20:15

## 2020-11-10 RX ADMIN — LIDOCAINE 2 PATCH: 560 PATCH PERCUTANEOUS; TOPICAL; TRANSDERMAL at 08:55

## 2020-11-10 RX ADMIN — DOCUSATE SODIUM 50 MG AND SENNOSIDES 8.6 MG 1 TABLET: 8.6; 5 TABLET, FILM COATED ORAL at 20:15

## 2020-11-10 RX ADMIN — CEFAZOLIN SODIUM 2 G: 2 INJECTION, SOLUTION INTRAVENOUS at 13:16

## 2020-11-10 RX ADMIN — SODIUM CHLORIDE 10 MG/HR: 900 INJECTION, SOLUTION INTRAVENOUS at 05:04

## 2020-11-10 RX ADMIN — HEPARIN SODIUM 5000 UNITS: 5000 INJECTION, SOLUTION INTRAVENOUS; SUBCUTANEOUS at 22:09

## 2020-11-10 RX ADMIN — ACETAMINOPHEN 975 MG: 325 TABLET, FILM COATED ORAL at 00:05

## 2020-11-10 RX ADMIN — ACETAMINOPHEN 975 MG: 325 TABLET, FILM COATED ORAL at 08:55

## 2020-11-10 RX ADMIN — POTASSIUM CHLORIDE 20 MEQ: 1500 TABLET, EXTENDED RELEASE ORAL at 05:27

## 2020-11-10 RX ADMIN — PANTOPRAZOLE SODIUM 40 MG: 40 INJECTION, POWDER, FOR SOLUTION INTRAVENOUS at 08:56

## 2020-11-10 RX ADMIN — OXYCODONE HYDROCHLORIDE 10 MG: 5 TABLET ORAL at 04:16

## 2020-11-10 RX ADMIN — CEFAZOLIN SODIUM 2 G: 2 INJECTION, SOLUTION INTRAVENOUS at 06:11

## 2020-11-10 RX ADMIN — HYDRALAZINE HYDROCHLORIDE 10 MG: 20 INJECTION INTRAMUSCULAR; INTRAVENOUS at 17:01

## 2020-11-10 RX ADMIN — SODIUM CHLORIDE 4 UNITS/HR: 9 INJECTION, SOLUTION INTRAVENOUS at 22:16

## 2020-11-10 RX ADMIN — ACETAMINOPHEN 975 MG: 325 TABLET, FILM COATED ORAL at 16:23

## 2020-11-10 RX ADMIN — GABAPENTIN 300 MG: 300 CAPSULE ORAL at 08:55

## 2020-11-10 RX ADMIN — SODIUM CHLORIDE 6 UNITS/HR: 9 INJECTION, SOLUTION INTRAVENOUS at 18:12

## 2020-11-10 RX ADMIN — HEPARIN SODIUM 5000 UNITS: 5000 INJECTION, SOLUTION INTRAVENOUS; SUBCUTANEOUS at 13:16

## 2020-11-10 RX ADMIN — HYDRALAZINE HYDROCHLORIDE 10 MG: 20 INJECTION INTRAMUSCULAR; INTRAVENOUS at 16:09

## 2020-11-10 RX ADMIN — BUSPIRONE HYDROCHLORIDE 10 MG: 10 TABLET ORAL at 08:55

## 2020-11-10 ASSESSMENT — ACTIVITIES OF DAILY LIVING (ADL)
ADLS_ACUITY_SCORE: 15

## 2020-11-10 NOTE — PROGRESS NOTES
Alert/orientedx4. Pain controlled, see MAR for PRNs.  Up to chair x3, and ambulated around unit with PT. Strong movement, denies lightheadedness/dizziness.   Tele SR. SBP goal <130. PRN hydralazine given x2. Nicardipine been off since 1030.  LS dim. Encouraging pulmonary toileting. IS only to 600.   Good appetite. Passing gas, no BM. Low UOP in [place], will leave overnight to monitor.   CT output as expected. Insulin gtt infusing, will remain overnight as well.     S/O Yu at bedside throughout day. Questions answered.

## 2020-11-10 NOTE — CONSULTS
CARDIAC SURGERY NUTRITION CONSULT    Received standing order to assess and educate patient.    Will follow and complete assessment once patient is extubated and/or is transferred to medical unit.    Patient will receive nutrition education during the Outpatient Cardiac Rehab Program (nutrition classes/dietitian counseling).    Veronica Melendrez, HUY, LD, [facility]

## 2020-11-10 NOTE — PROGRESS NOTES
"   11/10/20 1141   Quick Adds   Type of Visit Initial PT Evaluation   Living Environment   People in home spouse   Current Living Arrangements apartment   Home Accessibility stairs to enter home   Number of Stairs, Main Entrance 10   Transportation Anticipated car, drives self;family or friend will provide   Living Environment Comments Looking to move to the first floor, \"It was supposed to happen this week.\"   Self-Care   Usual Activity Tolerance good   Current Activity Tolerance moderate   Regular Exercise No   Equipment Currently Used at Home none   Disability/Function   Wear Glasses or Blind yes   Vision Management Glasses   Fall history within last six months no   Change in Functional Status Since Onset of Current Illness/Injury yes   General Information   Onset of Illness/Injury or Date of Surgery 11/10/20   Referring Physician Juan Jones, GEENA   Patient/Family Therapy Goals Statement (PT) Home   Pertinent History of Current Problem (include personal factors and/or comorbidities that impact the POC) 61 yo male POD#1 CABG x3 secondary to Multivessel CAD with NSTEMI. PRevious stenting in 2013. PMH HTN, DM 2 and prostate CA with chemo and radiation treatment.   Existing Precautions/Restrictions cardiac;fall   Heart Disease Risk Factors Diabetes;High blood pressure;Lack of physical activity;Dislipidemia;Medical history;Gender;Age   Cognition   Orientation Status (Cognition) oriented x 4   Affect/Mental Status (Cognition) WNL   Pain Assessment   Patient Currently in Pain Yes, see Vital Sign flowsheet  (\"L shoulder with deep breaths.\" At CT site \"pressure.\")   Integumentary/Edema   Integumentary/Edema Comments Slight pitting LEs   Posture    Posture Forward head position;Protracted shoulders   Range of Motion (ROM)   ROM Comment B LEs and UEs WFL   Strength   Strength Comments Demonstrates antigravity strength with funcitonal mobility; limited functional activity tolerance from baseline; MINAYA   Bed Mobility "   Comment (Bed Mobility) Sit>supine Min A x 2; sternal prec; logroll   Transfers   Transfer Safety Comments Sit>stand CGA ; holds breath; sternal precautions   Gait/Stairs (Locomotion)   Distance in Feet (Required for LE Total Joints) 180   Comment (Gait/Stairs) Gait in hallway, short step length, wide ROSANNA, lateral weight shift, use of WW for balance. CGA gait belt for safety.  2+ assist for lines, wc follow   Balance   Balance Comments Slight sway in standing, uses walker for finger tip balance, no acute LOB.   Clinical Impression   Criteria for Skilled Therapeutic Intervention yes, treatment indicated   PT Diagnosis (PT) Impaired functional activity [place]   Influenced by the following impairments Post op sternal precautions, weakness, fatigue, decreased balance; fall risk   Functional limitations due to impairments Decreased funcitonal independence   Clinical Presentation Stable/Uncomplicated   Clinical Presentation Rationale see MR   Clinical Decision Making (Complexity) low complexity   Therapy Frequency (PT) 2x/day   Predicted Duration of Therapy Intervention (days/wks) 5 days   Planned Therapy Interventions (PT) balance training;bed mobility training;gait training;home exercise program;neuromuscular re-education;ROM (range of motion);stair training;strengthening;stretching;transfer training   Risk & Benefits of therapy have been explained evaluation/treatment results reviewed;care plan/treatment goals reviewed;risks/benefits reviewed;current/potential barriers reviewed;participants voiced agreement with care plan;participants included;patient;spouse/significant other   PT Discharge Planning    PT Discharge Recommendation (DC Rec) home with outpatient physical therapy   PT Rationale for DC Rec Pt will benefit from continued skilled rehab services to closely monitor exercise progression and activity.   PT Brief overview of current status  CGA sit<>stand, Min A sit>supine. Ambulated 180' in [place] with WW, CGA  x1 2+ for lines and wc follow. Stable CV response.   Total Evaluation Time   Total Evaluation Time (Minutes) 10

## 2020-11-10 NOTE — PROGRESS NOTES
[facility]  Critical Care Service  Progress Note  Date of Service (when I saw the patient): 11/10/2020  Main Plans for Today  Hopefully wean per pathway  Assessment & Plan   Adrian Petty is a 60 year old male who was admitted on 11/5/2020. He underwent CAB x3  Neuro    1. Acute pain  2. Sedation  Plan:  -- Scheduled acetaminophen,   -- Propofol for sedation as needed until extubation  -- Delirium prevention as able    CV  1. S/p 3 vessel bypass  2. HTN  Plan:  -- Per pathway  -- Cardiac meds per CV surgery    Resp:  1. Post operative ventilator management  2. Acute respiratory failure  Plan:  -- Current settings are:  -- PST when hemodynamically stable    GI/Nutrition  1. No prior hx  Plan:  -- NPO  -- PPI    Renal  1. No prior hx.  Baseline creatinine appears to be 0.8  Plan:  --monitor function and electrolytes as needed with replacement per ICU protocols. - generally avoid nephrotoxic agents such as NSAID, IV contrast unless specifically required  -- adjust medications as needed for renal clearance  -- follow I/O's as appropriate.  -- maintain euvolemia      Endocrine  1. Stress Hyperglycemia  Plan:  --  Insulin gtt per protocol if indicated  -- Keep BG  <180 for optimal healing    Heme:  1. Acute blood loss anemia  2. Coagulopathy   Plan:  -- Monitor hemoglobin.  -- Transfuse to keep > 7.0        General cares:  DVT Prophylaxis: Pneumatic Compression Devices  GI Prophylaxis: PPI  Restraints: Restraints for medical healing needed: YES  Family update by me today: Yes   Current lines are required for patient management  Access:  Manjit Meier  Time Spent on this Encounter   Billing:  I spent 35 minutes bedside and on the inpatient unit today managing the critical care of Adrian Petty in relation to the issues listed in this note.  Interval History   Surgery today  Physical Exam   Temp: 98.1  F (36.7  C) Temp src: Bladder Temp  Min: 96.6  F (35.9  C)  Max: 98.6  F (37  C)  BP: 131/87 Pulse: 98   Resp: 22 SpO2: 97 % O2 Device: Nasal cannula Oxygen Delivery: 5 LPM  Vitals:    11/08/20 0635 11/09/20 0551 11/10/20 0215   Weight: 104.6 kg (230 lb 9.6 oz) 103.4 kg (228 lb) 104.6 kg (230 lb 9.6 oz)     I/O last 3 completed shifts:  In: 97919.16 [I.V.:9947.16]  Out: 1939 [Urine:1460; Chest Tube:479]    GEN: Sedated on ventilator  EYES: PERRL, Anicteric sclera.   HEENT:  Normocephalic, atraumatic, trachea midline, ETT secure  CV: RRR, no gallops, rubs, or murmurs  PULM/CHEST: Clear breath sounds bilaterally without rhonchi, crackles or wheeze, symmetric chest rise  GI: normal bowel sounds, soft, non-tender, no rebound tenderness or guarding, no masses  : [place] catheter in place, urine yellow and clear  EXTREMITIES: no peripheral edema, moving all extremities, peripheral pulses intact  NEURO: Sedated  SKIN: No rashes, sores or ulcerations  Imaging personally reviewed:  ECG    Data   Recent Labs   Lab 11/10/20  0355 11/09/20  1604 11/09/20  1425 11/06/20  0043 11/06/20  0043 11/05/20  2059 11/05/20  1656   WBC 15.5* 11.7* 14.9*   < >  --   --   --    HGB 13.2* 11.8* 9.9*   < >  --   --   --    MCV 88 88 88   < >  --   --   --     150 163   < >  --   --   --    INR  --  1.19* 1.42*  --   --   --  1.07    139 141   < >  --   --   --    POTASSIUM 3.8 3.8 3.2*   < >  --   --   --    CHLORIDE 108 109 110*   < >  --   --   --    CO2 25 25 22   < >  --   --   --    BUN 11 11 12   < >  --   --   --    CR 0.76 0.89 0.91   < >  --   --   --    ANIONGAP 3 5 9   < >  --   --   --    NADEEN 7.8* 7.9* 8.0*   < >  --   --   --    * 230* 221*   < >  --   --   --    ALBUMIN 3.3* 3.2*  --   --   --   --   --    PROTTOTAL 6.0* 5.8*  --   --   --   --   --    BILITOTAL 0.9 0.7  --   --   --   --   --    ALKPHOS 43 41  --   --   --   --   --    ALT 35 34  --   --   --   --   --    AST 36 44  --   --   --   --   --    TROPI  --   --   --   --  0.127* 0.043 <0.015    < > = values in this interval  not displayed.         Manjit Meier MD  [facility] Intensivist Service

## 2020-11-10 NOTE — PROGRESS NOTES
[facility]    Medicine Progress Note - Hospitalist Service       Date of Admission:  11/5/2020  Assessment & Plan       Adrian Petty is a 60 year old male with a history of CAD status post stenting at [facility] in [place] as recent as 2013, hypertension, hyperlipidemia, prostate cancer status post surgery, chemo, and radiation, and new diagnosis of type 2 diabetes who has been experiencing worsening exertional shortness of breath and chest discomfort.  Cardiology was following the patient and moved up his elective/outpatient angiogram 11/5.  Subsequent to the angiogram which showed multivessel disease, patient was being evaluated for CABG consideration with ultrasound of the legs and neck when he experienced sudden onset of shortness of breath, chest pain, nausea and vomiting.  He is subsequently admitted with nitroglycerin infusion. He is currently s/p CABG on 11/9/20.  .    Multivessel CAD, NSTEMI s/p CABG x 3 (LIMA-LAD< SV-Diag, SV-PDA) on 11/9  Know hx of CAD with prior stenting most recent 2013  Patient was extubated POD#0. EBL 450cc. Plant to transfer to the floor  - routine post op care per CV surgery  - current meds include- ASA 325mg daily, Metoprolol 25mg BID, Crestor 40mg daily  - continue tele, encouraged IS use, ambulation    Hypertension  Hyperlipidemia  meds as above    T2 Diabetes Mellitus-uncontrolled  A1c 8.0% 11/4.   - continue with insulin x 48hours post op, plan to transition off drip tomorrow afternoon  - Prior to discharge will need diabetic education and proper follow up at discharge  - will need to establish primary care locally for continued managment    History of prostate cancer, s/p resection, chemo, and radiation  Noted. No change in management while in hospital for now.        Diet: Regular Diet Adult    DVT Prophylaxis: Heparin SQ  Kraft Catheter: in place, indication: Strict 1-2 Hour I&O  Code Status: Full Code           Disposition Plan    Expected discharge: per CV surgery. ,     Entered: Ida Be MD 11/10/2020, 3:34 PM       The patient's care was discussed with the Patient and Patient's Family.    Ida Be MD  Hospitalist Service  [facility]  Contact information available via [facility] Paging/Directory    ______________________________________________________________________    Interval History   Pain is controlled.   Tolerating diet.   tmax 100 earlier today.     Data reviewed today: I reviewed all medications, new labs and imaging results over the last 24 hours. I personally reviewed no images or EKG's today.    Physical Exam   Vital Signs: Temp: 99.3  F (37.4  C) Temp src: Bladder BP: 136/75 Pulse: 80   Resp: 14 SpO2: 96 % O2 Device: Nasal cannula Oxygen Delivery: 5 LPM  Weight: 230 lbs 9.62 oz  General Appearance: Alert, awake and no apparent distress  Respiratory: diminished BS at the bases  Cardiovascular: regular rate and rhythm, +LE edema  GI: soft and non-tender  Skin: warm and dry      Data   Recent Labs   Lab 11/10/20  0355 11/09/20  1604 11/09/20  1431 11/09/20  1425 11/06/20  0043 11/06/20  0043 11/05/20  2059 11/05/20  1656   WBC 15.5* 11.7*  --  14.9*   < >  --   --   --    HGB 13.2* 11.8* 10.0* 9.9*   < >  --   --   --    MCV 88 88  --  88   < >  --   --   --     150  --  163   < >  --   --   --    INR  --  1.19*  --  1.42*  --   --   --  1.07    139 139 141   < >  --   --   --    POTASSIUM 3.8 3.8 3.2* 3.2*   < >  --   --   --    CHLORIDE 108 109  --  110*   < >  --   --   --    CO2 25 25  --  22   < >  --   --   --    BUN 11 11  --  12   < >  --   --   --    CR 0.76 0.89  --  0.91   < >  --   --   --    ANIONGAP 3 5  --  9   < >  --   --   --    NADEEN 7.8* 7.9*  --  8.0*   < >  --   --   --    * 230* 231* 221*   < >  --   --   --    ALBUMIN 3.3* 3.2*  --   --   --   --   --   --    PROTTOTAL 6.0* 5.8*  --   --   --   --   --   --    BILITOTAL 0.9 0.7  --   --   --    --   --   --    ALKPHOS 43 41  --   --   --   --   --   --    ALT 35 34  --   --   --   --   --   --    AST 36 44  --   --   --   --   --   --    TROPI  --   --   --   --   --  0.127* 0.043 <0.015    < > = values in this interval not displayed.     Recent Results (from the past 24 hour(s))   XR Chest Port 1 View    Narrative    EXAM: CHEST SINGLE VIEW PORTABLE  LOCATION: [facility]  DATE/TIME: 11/10/2020 5:11 AM    INDICATION: Coronary artery bypass surgery.  COMPARISON: 11/09/2020 at 1606 hours.      Impression    IMPRESSION:   1. Interval removal of an endotracheal tube and enteric feeding tube.  2. No other significant interval change since the recent comparison study.  3. A right internal jugular central venous catheter, mediastinal drain and left pleural drainage catheter are again noted.  4. Patchy opacities in the retrocardiac region of the left lung base could represent atelectasis or pneumonia.                Medications     dexmedetomidine Stopped (11/09/20 1612)     dextrose       dextrose 5% and 0.45% NaCl + KCl 20 mEq/L Stopped (11/10/20 1503)     EPINEPHrine IV infusion ADULT Stopped (11/09/20 1612)     insulin (regular) 4 Units/hr (11/10/20 1321)     niCARdipine Stopped (11/10/20 1035)     phenylephrine Stopped (11/09/20 1613)     propofol (DIPRIVAN) infusion Stopped (11/09/20 1700)     BETA BLOCKER NOT PRESCRIBED         acetaminophen  975 mg Oral Q8H     aspirin  325 mg Oral Daily     busPIRone  10 mg Oral TID     gabapentin  300 mg Oral BID     heparin ANTICOAGULANT  5,000 Units Subcutaneous Q8H     influenza recomb quadrivalent PF  0.5 mL Intramuscular Prior to discharge     insulin aspart   Subcutaneous TID w/meals     ketorolac  15 mg Intravenous Q6H     lidocaine  2 patch Transdermal Q24H     lidocaine   Transdermal Q8H     metoprolol tartrate  25 mg Oral BID     pantoprazole (PROTONIX) IV  40 mg Intravenous Daily     rosuvastatin  40 mg Oral Daily     senna-docusate  1 tablet  Oral BID    Or     senna-docusate  2 tablet Oral BID     sertraline  50 mg Oral Daily     sodium chloride (PF)  3 mL Intracatheter Q8H

## 2020-11-10 NOTE — PROGRESS NOTES
[facility]  Cardiovascular and Thoracic Surgery Daily Note          Assessment and Plan:   POD#1 s/p CABG x 3 (LIMA to LAD, SVG to diag, SVG to PDA) by Dr Herrera on 20  -CVS: -150/70-80s, HR 90-100s, nicardipine gtt, starting lopressor 25 mg PO bid, continue aspirin 325 mg, crestor 40 mg (PTA cozaar 50 mg, dyazide 37.5/25 mg and norvasc 10 mg)  -Resp: Extubated 20 at 1710, saturating well on NC, working with IS  -Neuro:  Moving all extremities, grossly intact  -Renal: good UO, UO 75 ml/hr, keep lawton in place  Creatinine   Date Value Ref Range Status   11/10/2020 0.76 0.66 - 1.25 mg/dL Final   -GI:  Continue bowel regimen  -:  Lawton in place, Cr 0.76  -Endo: Continue Insulin gtt 48hrs, Hgb A1C 7.7 ()  -FEN: replete lytes as needed, Na 136, K 3.8  -ID: Temp (24hrs), Av.4  F (36.3  C), Min:96.6  F (35.9  C), Max:98.6  F (37  C)     WBC   Date Value Ref Range Status   11/10/2020 15.5 (H) 4.0 - 11.0 10e9/L Final   ]  -Heme:   Hemoglobin   Date Value Ref Range Status   11/10/2020 13.2 (L) 13.3 - 17.7 g/dL Final   -Proph: PCD, change protonix to 40 mg PO, ok to start heparin subcutaneous tid  -Dispo: Encouraged IS/TCDB/sternal precautions. Will wean nicardipine gtt. Starting lopressor 25 mg PO bid. If needs increased BP control will start losartan. Ok to transfer to the step down unit. Will leave on insulin gtt. Keep lawton catheter today. Will schedule toradol x 5 more doses. Continue to monitor.           Interval History:   States that pain is being controlled. Denies any hallucinations. Breathing is ok. Working with IS. Not feeling very hungry. Denies any nausea. Denies flatus. Denies any popping/clicking in his breast bone. Has been out of bed. Was able to sleep well.           Medications:       acetaminophen  975 mg Oral Q8H     aspirin  325 mg Oral Daily     busPIRone  10 mg Oral TID     ceFAZolin  2 g Intravenous Q8H     gabapentin  300 mg Oral BID     influenza recomb  quadrivalent PF  0.5 mL Intramuscular Prior to discharge     insulin aspart   Subcutaneous TID w/meals     lidocaine  2 patch Transdermal Q24H     lidocaine   Transdermal Q8H     metoprolol tartrate  25 mg Oral BID     pantoprazole (PROTONIX) IV  40 mg Intravenous Daily     rosuvastatin  40 mg Oral Daily     senna-docusate  1 tablet Oral BID    Or     senna-docusate  2 tablet Oral BID     sertraline  50 mg Oral Daily     sodium chloride (PF)  3 mL Intracatheter Q8H     [START ON 11/12/2020] acetaminophen, albumin human, dextrose, glucose **OR** dextrose **OR** glucagon, fentaNYL, hydrALAZINE, hydrOXYzine, insulin aspart, ketorolac, lidocaine 4%, lidocaine (buffered or not buffered), melatonin, meperidine, methocarbamol, naloxone, ondansetron **OR** ondansetron, oxyCODONE, BETA BLOCKER NOT PRESCRIBED, sodium chloride (PF)          Physical Exam:   Vitals were reviewed  Blood pressure 131/87, pulse 98, temperature 98.1  F (36.7  C), resp. rate 22, weight 104.6 kg (230 lb 9.6 oz), SpO2 97 %.  Gen: lying in bed, comfortable, +O2    Lungs: CTA anteriorly,  ml    Cardiovascular: RRR, telemetry SR 90s, +dorsalis pedis pulses 2+, -edema LE    Abdomen: BS hypo, NT, mild distended, soft    Derm: sternal incision D/I   L LE incisions D/I    CT: serous output, -leak    CXR (11/10/20) - extubated, chest tubes in place, atelectasis    +nicardipine gtt  +lawton    Weight:   Vitals:    11/06/20 0223 11/07/20 0652 11/08/20 0635 11/09/20 0551   Weight: 101.4 kg (223 lb 9.6 oz) 103.6 kg (228 lb 6.4 oz) 104.6 kg (230 lb 9.6 oz) 103.4 kg (228 lb)    11/10/20 0215   Weight: 104.6 kg (230 lb 9.6 oz)            Data:   Labs:   Lab Results   Component Value Date    WBC 15.5 11/10/2020     Lab Results   Component Value Date    RBC 4.49 11/10/2020     Lab Results   Component Value Date    HGB 13.2 11/10/2020     Lab Results   Component Value Date    HCT 39.4 11/10/2020     No components found for: MCT  Lab Results   Component Value Date     MCV 88 11/10/2020     Lab Results   Component Value Date    MCH 29.4 11/10/2020     Lab Results   Component Value Date    MCHC 33.5 11/10/2020     Lab Results   Component Value Date    RDW 13.7 11/10/2020     Lab Results   Component Value Date     11/10/2020       Last Basic Metabolic Panel:  Lab Results   Component Value Date     11/10/2020      Lab Results   Component Value Date    POTASSIUM 3.8 11/10/2020     Lab Results   Component Value Date    CHLORIDE 108 11/10/2020     Lab Results   Component Value Date    NADEEN 7.8 11/10/2020     Lab Results   Component Value Date    CO2 25 11/10/2020     Lab Results   Component Value Date    BUN 11 11/10/2020     Lab Results   Component Value Date    CR 0.76 11/10/2020     Lab Results   Component Value Date     11/10/2020     Juan Jones PA-C  [phone]

## 2020-11-10 NOTE — PROGRESS NOTES
Pt successfully extubated yesterday evening and got up to the chair at 2200. He is alert and oriented x 4 and moves all extremities without difficulty. S1 and S2 preasent, and a pericardial friction rub was noted but remains unchanged from previous assessments. HR averages 80s to 90s and he has consistently been in NSR without ectopy. Nicardipine gtt titrated to achieve SBP goal of less than 130. So far, he has been hesitant to take deep breaths, so incentive spirometer encouraged and pt educated on need for good pulmonary toilet post procedure. LS clear anteriorly and laterally, but some fine crackles noted in the lung bases posteriorly. Multiple medications used to achieve adequate pain control. Plan to initiate oral BP medications, wean nicardipine as able, increase activity and continue aggressive pulmonary toilet.

## 2020-11-10 NOTE — RESULT ENCOUNTER NOTE
He is currently inpatient undergoing CABG, and this can wait until outpatient.  However, his tests suggest primary hyperaldosteronism.  The next step is an adrenal CT scan.  This will tell us if there is an adrenal tumor that should be resected, or if we should manage this medically.  We can set up this scan as an outpatient.

## 2020-11-11 ENCOUNTER — APPOINTMENT (OUTPATIENT)
Dept: PHYSICAL THERAPY | Facility: CLINIC | Age: 60
End: 2020-11-11
Attending: INTERNAL MEDICINE
Payer: COMMERCIAL

## 2020-11-11 LAB
ANION GAP SERPL CALCULATED.3IONS-SCNC: 6 MMOL/L (ref 3–14)
BUN SERPL-MCNC: 20 MG/DL (ref 7–30)
CALCIUM SERPL-MCNC: 8.8 MG/DL (ref 8.5–10.1)
CHLORIDE SERPL-SCNC: 106 MMOL/L (ref 94–109)
CO2 SERPL-SCNC: 23 MMOL/L (ref 20–32)
CREAT SERPL-MCNC: 1.12 MG/DL (ref 0.66–1.25)
ERYTHROCYTE [DISTWIDTH] IN BLOOD BY AUTOMATED COUNT: 14.2 % (ref 10–15)
GFR SERPL CREATININE-BSD FRML MDRD: 70 ML/MIN/{1.73_M2}
GLUCOSE BLDC GLUCOMTR-MCNC: 107 MG/DL (ref 70–99)
GLUCOSE BLDC GLUCOMTR-MCNC: 113 MG/DL (ref 70–99)
GLUCOSE BLDC GLUCOMTR-MCNC: 122 MG/DL (ref 70–99)
GLUCOSE BLDC GLUCOMTR-MCNC: 125 MG/DL (ref 70–99)
GLUCOSE BLDC GLUCOMTR-MCNC: 186 MG/DL (ref 70–99)
GLUCOSE BLDC GLUCOMTR-MCNC: 186 MG/DL (ref 70–99)
GLUCOSE BLDC GLUCOMTR-MCNC: 243 MG/DL (ref 70–99)
GLUCOSE BLDC GLUCOMTR-MCNC: 253 MG/DL (ref 70–99)
GLUCOSE SERPL-MCNC: 114 MG/DL (ref 70–99)
HCT VFR BLD AUTO: 38.4 % (ref 40–53)
HGB BLD-MCNC: 12.5 G/DL (ref 13.3–17.7)
INTERPRETATION ECG - MUSE: NORMAL
MCH RBC QN AUTO: 28.9 PG (ref 26.5–33)
MCHC RBC AUTO-ENTMCNC: 32.6 G/DL (ref 31.5–36.5)
MCV RBC AUTO: 89 FL (ref 78–100)
PLATELET # BLD AUTO: 209 10E9/L (ref 150–450)
POTASSIUM SERPL-SCNC: 3.2 MMOL/L (ref 3.4–5.3)
POTASSIUM SERPL-SCNC: 3.8 MMOL/L (ref 3.4–5.3)
RBC # BLD AUTO: 4.32 10E12/L (ref 4.4–5.9)
SODIUM SERPL-SCNC: 135 MMOL/L (ref 133–144)
WBC # BLD AUTO: 12.5 10E9/L (ref 4–11)

## 2020-11-11 PROCEDURE — 97530 THERAPEUTIC ACTIVITIES: CPT | Mod: GP | Performed by: PHYSICAL THERAPIST

## 2020-11-11 PROCEDURE — 120N000001 HC R&B MED SURG/OB

## 2020-11-11 PROCEDURE — 85027 COMPLETE CBC AUTOMATED: CPT | Performed by: PHYSICIAN ASSISTANT

## 2020-11-11 PROCEDURE — 258N000003 HC RX IP 258 OP 636: Performed by: PHYSICIAN ASSISTANT

## 2020-11-11 PROCEDURE — 80048 BASIC METABOLIC PNL TOTAL CA: CPT | Performed by: PHYSICIAN ASSISTANT

## 2020-11-11 PROCEDURE — 250N000013 HC RX MED GY IP 250 OP 250 PS 637: Performed by: PHYSICIAN ASSISTANT

## 2020-11-11 PROCEDURE — 99232 SBSQ HOSP IP/OBS MODERATE 35: CPT | Performed by: INTERNAL MEDICINE

## 2020-11-11 PROCEDURE — 97110 THERAPEUTIC EXERCISES: CPT | Mod: GP | Performed by: PHYSICAL THERAPIST

## 2020-11-11 PROCEDURE — 250N000013 HC RX MED GY IP 250 OP 250 PS 637: Performed by: INTERNAL MEDICINE

## 2020-11-11 PROCEDURE — 36415 COLL VENOUS BLD VENIPUNCTURE: CPT | Performed by: INTERNAL MEDICINE

## 2020-11-11 PROCEDURE — 36415 COLL VENOUS BLD VENIPUNCTURE: CPT | Performed by: PHYSICIAN ASSISTANT

## 2020-11-11 PROCEDURE — 84132 ASSAY OF SERUM POTASSIUM: CPT | Performed by: INTERNAL MEDICINE

## 2020-11-11 PROCEDURE — 250N000011 HC RX IP 250 OP 636: Performed by: PHYSICIAN ASSISTANT

## 2020-11-11 PROCEDURE — 999N001017 HC STATISTIC GLUCOSE BY METER IP

## 2020-11-11 PROCEDURE — 250N000012 HC RX MED GY IP 250 OP 636 PS 637: Performed by: PHYSICIAN ASSISTANT

## 2020-11-11 RX ORDER — POTASSIUM CHLORIDE 1500 MG/1
40 TABLET, EXTENDED RELEASE ORAL ONCE
Status: COMPLETED | OUTPATIENT
Start: 2020-11-11 | End: 2020-11-11

## 2020-11-11 RX ORDER — METOPROLOL TARTRATE 50 MG
50 TABLET ORAL 2 TIMES DAILY
Status: DISCONTINUED | OUTPATIENT
Start: 2020-11-11 | End: 2020-11-12

## 2020-11-11 RX ORDER — FUROSEMIDE 20 MG
20 TABLET ORAL DAILY
Status: DISCONTINUED | OUTPATIENT
Start: 2020-11-11 | End: 2020-11-12

## 2020-11-11 RX ORDER — DEXTROSE MONOHYDRATE 25 G/50ML
25-50 INJECTION, SOLUTION INTRAVENOUS
Status: DISCONTINUED | OUTPATIENT
Start: 2020-11-11 | End: 2020-11-14 | Stop reason: HOSPADM

## 2020-11-11 RX ORDER — NICOTINE POLACRILEX 4 MG
15-30 LOZENGE BUCCAL
Status: DISCONTINUED | OUTPATIENT
Start: 2020-11-11 | End: 2020-11-14 | Stop reason: HOSPADM

## 2020-11-11 RX ADMIN — ACETAMINOPHEN 975 MG: 325 TABLET, FILM COATED ORAL at 08:51

## 2020-11-11 RX ADMIN — ACETAMINOPHEN 975 MG: 325 TABLET, FILM COATED ORAL at 00:10

## 2020-11-11 RX ADMIN — METOPROLOL TARTRATE 50 MG: 50 TABLET, FILM COATED ORAL at 21:13

## 2020-11-11 RX ADMIN — LIDOCAINE 2 PATCH: 560 PATCH PERCUTANEOUS; TOPICAL; TRANSDERMAL at 08:50

## 2020-11-11 RX ADMIN — METOPROLOL TARTRATE 25 MG: 25 TABLET, FILM COATED ORAL at 08:51

## 2020-11-11 RX ADMIN — DOCUSATE SODIUM 50 MG AND SENNOSIDES 8.6 MG 1 TABLET: 8.6; 5 TABLET, FILM COATED ORAL at 21:13

## 2020-11-11 RX ADMIN — GABAPENTIN 300 MG: 300 CAPSULE ORAL at 08:51

## 2020-11-11 RX ADMIN — METHOCARBAMOL TABLETS 750 MG: 750 TABLET, COATED ORAL at 08:51

## 2020-11-11 RX ADMIN — SERTRALINE HYDROCHLORIDE 50 MG: 50 TABLET ORAL at 08:52

## 2020-11-11 RX ADMIN — HYDRALAZINE HYDROCHLORIDE 10 MG: 20 INJECTION INTRAMUSCULAR; INTRAVENOUS at 00:14

## 2020-11-11 RX ADMIN — OXYCODONE HYDROCHLORIDE 10 MG: 5 TABLET ORAL at 08:52

## 2020-11-11 RX ADMIN — KETOROLAC TROMETHAMINE 15 MG: 15 INJECTION, SOLUTION INTRAMUSCULAR; INTRAVENOUS at 06:21

## 2020-11-11 RX ADMIN — SODIUM CHLORIDE 2 UNITS/HR: 9 INJECTION, SOLUTION INTRAVENOUS at 04:14

## 2020-11-11 RX ADMIN — BUSPIRONE HYDROCHLORIDE 10 MG: 10 TABLET ORAL at 21:13

## 2020-11-11 RX ADMIN — HEPARIN SODIUM 5000 UNITS: 5000 INJECTION, SOLUTION INTRAVENOUS; SUBCUTANEOUS at 14:02

## 2020-11-11 RX ADMIN — BUSPIRONE HYDROCHLORIDE 10 MG: 10 TABLET ORAL at 16:29

## 2020-11-11 RX ADMIN — OXYCODONE HYDROCHLORIDE 10 MG: 5 TABLET ORAL at 16:28

## 2020-11-11 RX ADMIN — ASPIRIN 325 MG: 325 TABLET, COATED ORAL at 08:51

## 2020-11-11 RX ADMIN — ROSUVASTATIN CALCIUM 40 MG: 20 TABLET, FILM COATED ORAL at 08:51

## 2020-11-11 RX ADMIN — INSULIN GLARGINE 15 UNITS: 100 INJECTION, SOLUTION SUBCUTANEOUS at 11:50

## 2020-11-11 RX ADMIN — BUSPIRONE HYDROCHLORIDE 10 MG: 10 TABLET ORAL at 08:52

## 2020-11-11 RX ADMIN — HEPARIN SODIUM 5000 UNITS: 5000 INJECTION, SOLUTION INTRAVENOUS; SUBCUTANEOUS at 21:13

## 2020-11-11 RX ADMIN — DOCUSATE SODIUM 50 MG AND SENNOSIDES 8.6 MG 2 TABLET: 8.6; 5 TABLET, FILM COATED ORAL at 08:51

## 2020-11-11 RX ADMIN — ACETAMINOPHEN 975 MG: 325 TABLET, FILM COATED ORAL at 16:28

## 2020-11-11 RX ADMIN — HEPARIN SODIUM 5000 UNITS: 5000 INJECTION, SOLUTION INTRAVENOUS; SUBCUTANEOUS at 06:22

## 2020-11-11 RX ADMIN — POTASSIUM CHLORIDE 40 MEQ: 1500 TABLET, EXTENDED RELEASE ORAL at 09:39

## 2020-11-11 RX ADMIN — INSULIN ASPART 3 UNITS: 100 INJECTION, SOLUTION INTRAVENOUS; SUBCUTANEOUS at 17:49

## 2020-11-11 RX ADMIN — PANTOPRAZOLE SODIUM 40 MG: 40 TABLET, DELAYED RELEASE ORAL at 08:51

## 2020-11-11 RX ADMIN — KETOROLAC TROMETHAMINE 15 MG: 15 INJECTION, SOLUTION INTRAMUSCULAR; INTRAVENOUS at 00:10

## 2020-11-11 RX ADMIN — GABAPENTIN 300 MG: 300 CAPSULE ORAL at 21:13

## 2020-11-11 RX ADMIN — FUROSEMIDE 20 MG: 20 TABLET ORAL at 11:50

## 2020-11-11 ASSESSMENT — ACTIVITIES OF DAILY LIVING (ADL)
ADLS_ACUITY_SCORE: 19

## 2020-11-11 NOTE — PROGRESS NOTES
[facility]  Cardiovascular and Thoracic Surgery Daily Note          Assessment and Plan:   POD#2 s/p CABG x 3 (LIMA to LAD, SVG to diag, SVG to PDA) by Dr Herrera on 20  -CVS: -130/60-70s, HR 90s, increase lopressor to 50 mg PO bid, aspirin 325 mg, crestor 40 mg (PTA cozaar 50 mg, dyazide 37.5/25 mg and norvasc 10 mg), if additional BP control needed will look to restart cozaar  -Resp: Extubated 20 at 1710, saturating well on NC, working with IS  -Neuro:  Moving all extremities, grossly intact  -Renal: good UO, lawton removed am   Creatinine   Date Value Ref Range Status   2020 1.12 0.66 - 1.25 mg/dL Final   -GI:  Continue bowel regimen  -:  Lawton in place, Cr 1.12  -Endo: Hgb A1C 7.7 (), will give lantus x 1 and change to sliding scale insulin, will likely need metformin at time of discharge, newly diagnosed diabetic  -FEN: replete lytes as needed, Na 135, K 3.2  -ID: Temp (24hrs), Av.4  F (36.3  C), Min:96.6  F (35.9  C), Max:98.6  F (37  C)     WBC   Date Value Ref Range Status   2020 12.5 (H) 4.0 - 11.0 10e9/L Final   ]  -Heme:   Hemoglobin   Date Value Ref Range Status   2020 12.5 (L) 13.3 - 17.7 g/dL Final   -Proph: PCD, change protonix to 40 mg PO, heparin subcutaneous tid  -Dispo: Encouraged IS/TCDB/sternal precautions. Increase lopressor to 50 mg PO bid. If needs increased BP control will start losartan. Transition to sliding scale insulin and lantus x 1. Lawton removed. Looking at likely discharge to home on Friday/Saturday. Continue to monitor.           Interval History:   States that pain is being controlled. Denies any hallucinations. Breathing is ok. Working with IS. Appetite is ok. Denies any nausea. +flatus/-BM. Denies any popping/clicking in his breast bone. Ambulated x 1. Some sleep. Asking about talking to someone about nutrition.            Medications:       acetaminophen  975 mg Oral Q8H     aspirin  325 mg Oral Daily      busPIRone  10 mg Oral TID     gabapentin  300 mg Oral BID     heparin ANTICOAGULANT  5,000 Units Subcutaneous Q8H     influenza recomb quadrivalent PF  0.5 mL Intramuscular Prior to discharge     insulin aspart   Subcutaneous TID w/meals     ketorolac  15 mg Intravenous Q6H     lidocaine  2 patch Transdermal Q24H     lidocaine   Transdermal Q8H     metoprolol tartrate  25 mg Oral BID     pantoprazole  40 mg Oral QAM AC     rosuvastatin  40 mg Oral Daily     senna-docusate  1 tablet Oral BID    Or     senna-docusate  2 tablet Oral BID     sertraline  50 mg Oral Daily     sodium chloride (PF)  3 mL Intracatheter Q8H     [START ON 11/12/2020] acetaminophen, dextrose, glucose **OR** dextrose **OR** glucagon, hydrALAZINE, HYDROmorphone, hydrOXYzine, insulin aspart, lidocaine 4%, lidocaine (buffered or not buffered), melatonin, methocarbamol, naloxone, ondansetron **OR** ondansetron, oxyCODONE, sodium chloride (PF)          Physical Exam:   Vitals were reviewed  Blood pressure 139/71, pulse 95, temperature 99.5  F (37.5  C), temperature source Oral, resp. rate 16, weight 106.5 kg (234 lb 14.4 oz), SpO2 92 %.  Gen: up in chair, comfortable, +O2    Lungs: decreased bases bilaterally,  ml    Cardiovascular: RRR, telemetry SR 90s, +dorsalis pedis pulses 2+, +edema LE    Abdomen: BS+, NT, mild distended, soft    Derm: sternal incision D/I   L LE incisions D/I    CT: serous output, -leak    CXR (11/10/20) - extubated, chest tubes in place, atelectasis    Carotid US (11/5/20) - R ICA 50-69% stenosis   L ICA < than 50% stenosis    Echo (11/5/20) - normal LV size and systolic function, EF 60-65%   No regional WMA   Normal RV size and function   No significant valve disease    Weight:   Vitals:    11/08/20 0635 11/09/20 0551 11/10/20 0215 11/10/20 1850   Weight: 104.6 kg (230 lb 9.6 oz) 103.4 kg (228 lb) 104.6 kg (230 lb 9.6 oz) 106.7 kg (235 lb 3.2 oz)    11/11/20 0720   Weight: 106.5 kg (234 lb 14.4 oz)            Data:    Labs:   Lab Results   Component Value Date    WBC 15.5 11/10/2020     Lab Results   Component Value Date    RBC 4.49 11/10/2020     Lab Results   Component Value Date    HGB 13.2 11/10/2020     Lab Results   Component Value Date    HCT 39.4 11/10/2020     No components found for: MCT  Lab Results   Component Value Date    MCV 88 11/10/2020     Lab Results   Component Value Date    MCH 29.4 11/10/2020     Lab Results   Component Value Date    MCHC 33.5 11/10/2020     Lab Results   Component Value Date    RDW 13.7 11/10/2020     Lab Results   Component Value Date     11/10/2020       Last Basic Metabolic Panel:  Lab Results   Component Value Date     11/10/2020      Lab Results   Component Value Date    POTASSIUM 3.8 11/10/2020     Lab Results   Component Value Date    CHLORIDE 108 11/10/2020     Lab Results   Component Value Date    NADEEN 7.8 11/10/2020     Lab Results   Component Value Date    CO2 25 11/10/2020     Lab Results   Component Value Date    BUN 11 11/10/2020     Lab Results   Component Value Date    CR 0.76 11/10/2020     Lab Results   Component Value Date     11/10/2020     Juan Jones PA-C  [phone]

## 2020-11-11 NOTE — PROGRESS NOTES
[facility]    Medicine Progress Note - Hospitalist Service       Date of Admission:  11/5/2020  Assessment & Plan         Adrian Petty is a 60 year old male with a history of CAD status post stenting at [facility] in [place] as recent as 2013, hypertension, hyperlipidemia, prostate cancer status post surgery, chemo, and radiation, and new diagnosis of type 2 diabetes who has been experiencing worsening exertional shortness of breath and chest discomfort.  Cardiology was following the patient and moved up his elective/outpatient angiogram 11/5.  Subsequent to the angiogram which showed multivessel disease, patient was being evaluated for CABG consideration with ultrasound of the legs and neck when he experienced sudden onset of shortness of breath, chest pain, nausea and vomiting.  He is subsequently admitted with nitroglycerin infusion. He is currently s/p CABG on 11/9/20.  .    Multivessel CAD, NSTEMI s/p CABG x 3 (LIMA-LAD< SV-Diag, SV-PDA) on 11/9  Know hx of CAD with prior stenting most recent 2013  Patient was extubated POD#0. EBL 450cc.  - routine post op care per CV surgery  - current meds include- ASA 325mg daily, Metoprolol increased to 50mg BID, Crestor 40mg daily  - CV surgery is adjusting cardiac/bp meds, bp currently on the higher side  - continue tele, encouraged IS use, ambulation    Hypertension  Hyperlipidemia  meds as above    T2 Diabetes Mellitus-uncontrolled  A1c 8.0% 11/4.   - Insulin gtt discontinued this am and Lantus 15 units per CV surgery, discussed with Juan SEAY  - will asses insulin tomorrow, may need same dose lantus vs increased  - continue with sliding scale insulin  - plan for initiation of Metformin when nearing discharge  - Prior to discharge will need diabetic education and proper follow up at discharge  - will need to establish primary care locally for continued managment    History of prostate cancer, s/p resection, chemo, and  radiation  Noted. No change in management while in hospital for now.        Diet: Regular Diet Adult    DVT Prophylaxis: Heparin SQ  Kraft Catheter: not present  Code Status: Full Code           Disposition Plan   Expected discharge: per CV surgery. ,     Entered: Ida Be MD 11/11/2020, 3:30 PM       The patient's care was discussed with the Bedside Nurse, Patient and Patient's Family.    Ida Be MD  Hospitalist Service  [facility]  Contact information available via [facility] Paging/Directory    ______________________________________________________________________    Interval History   At time of evaluation, he was just getting back from the bathroom and felt wheezy when he was up. Felt a bit short of breath. Shortly after sitting down, no longer feeling wheezy. On exam, he does not wheezing, lungs are clear other than diminished at the bases. He is working on IS/flutter valve. He is on RA. Pulse ox showed sats in the 90s after sitting down. Tmax 99.5 tpdau  Denies n/v.     Data reviewed today: I reviewed all medications, new labs and imaging results over the last 24 hours. I personally reviewed no images or EKG's today.    Physical Exam   Vital Signs: Temp: 98.1  F (36.7  C) Temp src: Oral BP: (!) 154/76 Pulse: 94   Resp: 16 SpO2: 94 % O2 Device: None (Room air) Oxygen Delivery: 2 LPM  Weight: 234 lbs 14.4 oz  General Appearance: Alert, awake and no apparent distress  Respiratory: diminished BS at the bases, otherwise clear, no wheezing  Cardiovascular: regular rate and rhythm, +LE edema  GI: soft and non-tender  Skin: warm and dry      Data   Recent Labs   Lab 11/11/20  1438 11/11/20  0755 11/10/20  0355 11/09/20  1604 11/09/20  1425 11/09/20  1425 11/06/20  0043 11/06/20  0043 11/05/20  2059 11/05/20  1656   WBC  --  12.5* 15.5* 11.7*  --  14.9*   < >  --   --   --    HGB  --  12.5* 13.2* 11.8*   < > 9.9*   < >  --   --   --    MCV  --  89 88 88  --  88   < >  --    --   --    PLT  --  209 194 150  --  163   < >  --   --   --    INR  --   --   --  1.19*  --  1.42*  --   --   --  1.07   NA  --  135 136 139   < > 141   < >  --   --   --    POTASSIUM 3.8 3.2* 3.8 3.8   < > 3.2*   < >  --   --   --    CHLORIDE  --  106 108 109  --  110*   < >  --   --   --    CO2  --  23 25 25  --  22   < >  --   --   --    BUN  --  20 11 11  --  12   < >  --   --   --    CR  --  1.12 0.76 0.89  --  0.91   < >  --   --   --    ANIONGAP  --  6 3 5  --  9   < >  --   --   --    NADEEN  --  8.8 7.8* 7.9*  --  8.0*   < >  --   --   --    GLC  --  114* 132* 230*   < > 221*   < >  --   --   --    ALBUMIN  --   --  3.3* 3.2*  --   --   --   --   --   --    PROTTOTAL  --   --  6.0* 5.8*  --   --   --   --   --   --    BILITOTAL  --   --  0.9 0.7  --   --   --   --   --   --    ALKPHOS  --   --  43 41  --   --   --   --   --   --    ALT  --   --  35 34  --   --   --   --   --   --    AST  --   --  36 44  --   --   --   --   --   --    TROPI  --   --   --   --   --   --   --  0.127* 0.043 <0.015    < > = values in this interval not displayed.     No results found for this or any previous visit (from the past 24 hour(s)).  Medications     dextrose       dextrose 5% and 0.45% NaCl + KCl 20 mEq/L Stopped (11/10/20 3789)       acetaminophen  975 mg Oral Q8H     aspirin  325 mg Oral Daily     busPIRone  10 mg Oral TID     furosemide  20 mg Oral Daily     gabapentin  300 mg Oral BID     heparin ANTICOAGULANT  5,000 Units Subcutaneous Q8H     influenza recomb quadrivalent PF  0.5 mL Intramuscular Prior to discharge     insulin aspart  1-7 Units Subcutaneous TID AC     insulin aspart  1-5 Units Subcutaneous At Bedtime     lidocaine  2 patch Transdermal Q24H     lidocaine   Transdermal Q8H     metoprolol tartrate  50 mg Oral BID     pantoprazole  40 mg Oral QAM AC     rosuvastatin  40 mg Oral Daily     senna-docusate  1 tablet Oral BID    Or     senna-docusate  2 tablet Oral BID     sertraline  50 mg Oral Daily      sodium chloride (PF)  3 mL Intracatheter Q8H

## 2020-11-11 NOTE — PLAN OF CARE
AVSS. Rhythms sinus. Weaned off oxygen and sating well on RA. Pulmonary toilet encouraged. Incisions frederic. CT, wires and lawton removed. Insulin gtt discontinued, basal insulin initiated. Denies pain. Electrolytes repletion complete. Voiding adequately on own. Flatus + however no BM yet. OOB and ambulating in halls. Diabetes teaching initiated. Heart videos/teaching done. Will continue to monitor.

## 2020-11-11 NOTE — CONSULTS
"NUTRITION ASSESSMENT      REASON FOR ASSESSMENT:  Cardiac Surgery Nutrition Consult    CURRENT DIET / INTAKE:  -Regular diet  -Patient reports normal appetite and has not noticed any decrease in appetite since surgery.  -Patient isn't necessarily fond of the hospital's food so orders generally the same meal three times a day.  -He was finishing up his lunch while I was visiting him. His lunch included a 2 egg omelet with spinach and cheese, Divehi toast, vanilla ice cream, and a fruit cup.  -He is familiar with protein sources and eats chicken, salmon, and eggs regularly.       ANTHROPOMETRICS:   Ht: 177.8 cm (5'10\")  Wt: 101.4 kg   BMI: 32.1 kg/m^2  IBW: 75.5 kg  Weight Status: Obesity Grade I BMI 30-34.9  %IBW: 134%    MALNUTRITION:  Patient does not meet two of the following criteria necessary for diagnosing malnutrition:  significant weight loss, reduced intake, subcutaneous fat loss, muscle loss or fluid retention. Nutrition Focused Physical Assessment (NFPA) not appropriate at this time.    NUTRITION DIAGNOSIS:   No nutrition diagnosis at this time.    INTERVENTIONS:    Nutrition Prescription:  Continue regular diet.    Implementation:  Nutrition Education (Content):  Discussed the importance of adequate nutrition post-op for healing and energy, emphasizing protein foods, and encouraged patient to order a protein food at each meal.    Goals:  Patient to consume ~75% at meals in the next 3 - 5 days    Follow Up/Monitoring (InPatient):  Food and Fluid intake -  Monitor for adequacy    Follow Up/Monitoring (OutPatient):  Patient will participate in out-patient cardiac rehab and attend nutrition classes during the program    Di Worthington  Dietetic Intern    "

## 2020-11-11 NOTE — PLAN OF CARE
POD #2 CABG x3 with LUE harvest. A&Ox4. VSS on 3L O2 NC. C/o pressure to chest, given scheduled toradol, tylenol, PRN robaxin. Sternal incision ALINE, intact. LUE harvest site intact, ALINE, CMS intact. 2-to-1 CT to -20 cm suction, adequate output. A1 GB/W. Flatus +, BM -. Tele: SR w/ PVCs. Insulin drip on Algorithm 4, 4 units/hr. Kraft patent with good output. Continue to monitor.

## 2020-11-12 ENCOUNTER — APPOINTMENT (OUTPATIENT)
Dept: PHYSICAL THERAPY | Facility: CLINIC | Age: 60
End: 2020-11-12
Attending: INTERNAL MEDICINE
Payer: COMMERCIAL

## 2020-11-12 ENCOUNTER — APPOINTMENT (OUTPATIENT)
Dept: GENERAL RADIOLOGY | Facility: CLINIC | Age: 60
End: 2020-11-12
Attending: PHYSICIAN ASSISTANT
Payer: COMMERCIAL

## 2020-11-12 LAB
ALDOSTERONE RENIN RATIO: NORMAL (ref 0–25)
ANION GAP SERPL CALCULATED.3IONS-SCNC: 5 MMOL/L (ref 3–14)
BUN SERPL-MCNC: 17 MG/DL (ref 7–30)
CALCIUM SERPL-MCNC: 8.6 MG/DL (ref 8.5–10.1)
CHLORIDE SERPL-SCNC: 104 MMOL/L (ref 94–109)
CO2 SERPL-SCNC: 25 MMOL/L (ref 20–32)
CREAT SERPL-MCNC: 0.86 MG/DL (ref 0.66–1.25)
ERYTHROCYTE [DISTWIDTH] IN BLOOD BY AUTOMATED COUNT: 14.2 % (ref 10–15)
GFR SERPL CREATININE-BSD FRML MDRD: >90 ML/MIN/{1.73_M2}
GLUCOSE BLDC GLUCOMTR-MCNC: 174 MG/DL (ref 70–99)
GLUCOSE BLDC GLUCOMTR-MCNC: 219 MG/DL (ref 70–99)
GLUCOSE SERPL-MCNC: 200 MG/DL (ref 70–99)
HCT VFR BLD AUTO: 38.2 % (ref 40–53)
HGB BLD-MCNC: 12.4 G/DL (ref 13.3–17.7)
MCH RBC QN AUTO: 29 PG (ref 26.5–33)
MCHC RBC AUTO-ENTMCNC: 32.5 G/DL (ref 31.5–36.5)
MCV RBC AUTO: 89 FL (ref 78–100)
PLATELET # BLD AUTO: 216 10E9/L (ref 150–450)
POTASSIUM SERPL-SCNC: 3.8 MMOL/L (ref 3.4–5.3)
RBC # BLD AUTO: 4.28 10E12/L (ref 4.4–5.9)
SODIUM SERPL-SCNC: 134 MMOL/L (ref 133–144)
WBC # BLD AUTO: 9.3 10E9/L (ref 4–11)

## 2020-11-12 PROCEDURE — 250N000009 HC RX 250: Performed by: PHYSICIAN ASSISTANT

## 2020-11-12 PROCEDURE — 97530 THERAPEUTIC ACTIVITIES: CPT | Mod: GP | Performed by: PHYSICAL THERAPIST

## 2020-11-12 PROCEDURE — 250N000013 HC RX MED GY IP 250 OP 250 PS 637: Performed by: PHYSICIAN ASSISTANT

## 2020-11-12 PROCEDURE — 94640 AIRWAY INHALATION TREATMENT: CPT

## 2020-11-12 PROCEDURE — 999N001017 HC STATISTIC GLUCOSE BY METER IP

## 2020-11-12 PROCEDURE — 36415 COLL VENOUS BLD VENIPUNCTURE: CPT | Performed by: PHYSICIAN ASSISTANT

## 2020-11-12 PROCEDURE — 250N000011 HC RX IP 250 OP 636: Performed by: PHYSICIAN ASSISTANT

## 2020-11-12 PROCEDURE — 80048 BASIC METABOLIC PNL TOTAL CA: CPT | Performed by: PHYSICIAN ASSISTANT

## 2020-11-12 PROCEDURE — 120N000001 HC R&B MED SURG/OB

## 2020-11-12 PROCEDURE — 999N000157 HC STATISTIC RCP TIME EA 10 MIN

## 2020-11-12 PROCEDURE — 99231 SBSQ HOSP IP/OBS SF/LOW 25: CPT | Performed by: INTERNAL MEDICINE

## 2020-11-12 PROCEDURE — 250N000011 HC RX IP 250 OP 636: Performed by: SURGERY

## 2020-11-12 PROCEDURE — 97110 THERAPEUTIC EXERCISES: CPT | Mod: GP | Performed by: PHYSICAL THERAPIST

## 2020-11-12 PROCEDURE — 250N000013 HC RX MED GY IP 250 OP 250 PS 637: Performed by: SURGERY

## 2020-11-12 PROCEDURE — 85027 COMPLETE CBC AUTOMATED: CPT | Performed by: PHYSICIAN ASSISTANT

## 2020-11-12 PROCEDURE — 97110 THERAPEUTIC EXERCISES: CPT | Mod: GP

## 2020-11-12 PROCEDURE — 97530 THERAPEUTIC ACTIVITIES: CPT | Mod: GP

## 2020-11-12 PROCEDURE — 71046 X-RAY EXAM CHEST 2 VIEWS: CPT

## 2020-11-12 RX ORDER — IPRATROPIUM BROMIDE AND ALBUTEROL SULFATE 2.5; .5 MG/3ML; MG/3ML
3 SOLUTION RESPIRATORY (INHALATION)
Status: COMPLETED | OUTPATIENT
Start: 2020-11-12 | End: 2020-11-13

## 2020-11-12 RX ORDER — GUAIFENESIN 600 MG/1
600 TABLET, EXTENDED RELEASE ORAL 2 TIMES DAILY
Status: DISCONTINUED | OUTPATIENT
Start: 2020-11-12 | End: 2020-11-14 | Stop reason: HOSPADM

## 2020-11-12 RX ORDER — FUROSEMIDE 40 MG
40 TABLET ORAL DAILY
Status: DISCONTINUED | OUTPATIENT
Start: 2020-11-12 | End: 2020-11-14 | Stop reason: HOSPADM

## 2020-11-12 RX ORDER — HYDRALAZINE HYDROCHLORIDE 20 MG/ML
20 INJECTION INTRAMUSCULAR; INTRAVENOUS EVERY 4 HOURS PRN
Status: DISCONTINUED | OUTPATIENT
Start: 2020-11-12 | End: 2020-11-14 | Stop reason: HOSPADM

## 2020-11-12 RX ORDER — LOSARTAN POTASSIUM 25 MG/1
25 TABLET ORAL DAILY
Status: DISCONTINUED | OUTPATIENT
Start: 2020-11-12 | End: 2020-11-13

## 2020-11-12 RX ORDER — BLOOD PRESSURE TEST KIT
KIT MISCELLANEOUS
Qty: 100 EACH | Refills: 0 | Status: SHIPPED | OUTPATIENT
Start: 2020-11-12

## 2020-11-12 RX ORDER — POTASSIUM CHLORIDE 1500 MG/1
20 TABLET, EXTENDED RELEASE ORAL ONCE
Status: COMPLETED | OUTPATIENT
Start: 2020-11-12 | End: 2020-11-12

## 2020-11-12 RX ADMIN — ROSUVASTATIN CALCIUM 40 MG: 20 TABLET, FILM COATED ORAL at 09:15

## 2020-11-12 RX ADMIN — HYDRALAZINE HYDROCHLORIDE 10 MG: 20 INJECTION INTRAMUSCULAR; INTRAVENOUS at 01:01

## 2020-11-12 RX ADMIN — GUAIFENESIN 600 MG: 600 TABLET, EXTENDED RELEASE ORAL at 13:43

## 2020-11-12 RX ADMIN — FUROSEMIDE 40 MG: 40 TABLET ORAL at 09:14

## 2020-11-12 RX ADMIN — BUSPIRONE HYDROCHLORIDE 10 MG: 10 TABLET ORAL at 21:14

## 2020-11-12 RX ADMIN — HEPARIN SODIUM 5000 UNITS: 5000 INJECTION, SOLUTION INTRAVENOUS; SUBCUTANEOUS at 06:29

## 2020-11-12 RX ADMIN — METOPROLOL TARTRATE 75 MG: 50 TABLET, FILM COATED ORAL at 19:25

## 2020-11-12 RX ADMIN — IPRATROPIUM BROMIDE AND ALBUTEROL SULFATE 3 ML: .5; 3 SOLUTION RESPIRATORY (INHALATION) at 19:48

## 2020-11-12 RX ADMIN — INSULIN ASPART 1 UNITS: 100 INJECTION, SOLUTION INTRAVENOUS; SUBCUTANEOUS at 16:19

## 2020-11-12 RX ADMIN — ACETAMINOPHEN 975 MG: 325 TABLET, FILM COATED ORAL at 00:37

## 2020-11-12 RX ADMIN — BUSPIRONE HYDROCHLORIDE 10 MG: 10 TABLET ORAL at 16:01

## 2020-11-12 RX ADMIN — ASPIRIN 325 MG: 325 TABLET, COATED ORAL at 09:15

## 2020-11-12 RX ADMIN — PANTOPRAZOLE SODIUM 40 MG: 40 TABLET, DELAYED RELEASE ORAL at 06:29

## 2020-11-12 RX ADMIN — GUAIFENESIN 600 MG: 600 TABLET, EXTENDED RELEASE ORAL at 21:14

## 2020-11-12 RX ADMIN — LIDOCAINE 2 PATCH: 560 PATCH PERCUTANEOUS; TOPICAL; TRANSDERMAL at 09:13

## 2020-11-12 RX ADMIN — BUSPIRONE HYDROCHLORIDE 10 MG: 10 TABLET ORAL at 09:15

## 2020-11-12 RX ADMIN — LOSARTAN POTASSIUM 25 MG: 25 TABLET, FILM COATED ORAL at 10:37

## 2020-11-12 RX ADMIN — SERTRALINE HYDROCHLORIDE 50 MG: 50 TABLET ORAL at 09:15

## 2020-11-12 RX ADMIN — METFORMIN HYDROCHLORIDE 500 MG: 500 TABLET, FILM COATED ORAL at 17:24

## 2020-11-12 RX ADMIN — METFORMIN HYDROCHLORIDE 500 MG: 500 TABLET, FILM COATED ORAL at 10:37

## 2020-11-12 RX ADMIN — ACETAMINOPHEN 975 MG: 325 TABLET, FILM COATED ORAL at 09:15

## 2020-11-12 RX ADMIN — HEPARIN SODIUM 5000 UNITS: 5000 INJECTION, SOLUTION INTRAVENOUS; SUBCUTANEOUS at 13:43

## 2020-11-12 RX ADMIN — HYDRALAZINE HYDROCHLORIDE 10 MG: 20 INJECTION INTRAMUSCULAR; INTRAVENOUS at 16:18

## 2020-11-12 RX ADMIN — POTASSIUM CHLORIDE 20 MEQ: 1500 TABLET, EXTENDED RELEASE ORAL at 10:37

## 2020-11-12 RX ADMIN — HEPARIN SODIUM 5000 UNITS: 5000 INJECTION, SOLUTION INTRAVENOUS; SUBCUTANEOUS at 21:14

## 2020-11-12 RX ADMIN — METOPROLOL TARTRATE 50 MG: 50 TABLET, FILM COATED ORAL at 09:16

## 2020-11-12 RX ADMIN — GABAPENTIN 300 MG: 300 CAPSULE ORAL at 09:14

## 2020-11-12 RX ADMIN — INSULIN ASPART 2 UNITS: 100 INJECTION, SOLUTION INTRAVENOUS; SUBCUTANEOUS at 09:21

## 2020-11-12 RX ADMIN — IPRATROPIUM BROMIDE AND ALBUTEROL SULFATE 3 ML: .5; 3 SOLUTION RESPIRATORY (INHALATION) at 13:59

## 2020-11-12 RX ADMIN — OXYCODONE HYDROCHLORIDE 5 MG: 5 TABLET ORAL at 23:05

## 2020-11-12 RX ADMIN — HYDRALAZINE HYDROCHLORIDE 20 MG: 20 INJECTION INTRAMUSCULAR; INTRAVENOUS at 18:13

## 2020-11-12 RX ADMIN — ACETAMINOPHEN 650 MG: 325 TABLET, FILM COATED ORAL at 17:24

## 2020-11-12 RX ADMIN — DOCUSATE SODIUM 50 MG AND SENNOSIDES 8.6 MG 1 TABLET: 8.6; 5 TABLET, FILM COATED ORAL at 09:15

## 2020-11-12 RX ADMIN — METHOCARBAMOL TABLETS 750 MG: 750 TABLET, COATED ORAL at 09:20

## 2020-11-12 RX ADMIN — METHOCARBAMOL TABLETS 750 MG: 750 TABLET, COATED ORAL at 17:24

## 2020-11-12 ASSESSMENT — ACTIVITIES OF DAILY LIVING (ADL)
ADLS_ACUITY_SCORE: 19
ADLS_ACUITY_SCORE: 17
ADLS_ACUITY_SCORE: 17
DEPENDENT_IADLS:: INDEPENDENT
ADLS_ACUITY_SCORE: 17
ADLS_ACUITY_SCORE: 19
ADLS_ACUITY_SCORE: 19

## 2020-11-12 NOTE — PLAN OF CARE
Pt alert and oriented, chest tubes removed earlier today, dressing clean dry and intact, lungs clear but diminished, IS encouraged, oral pain medications given with good results, tolerates diet, good urine output, passing flatus, walking in halls with assist of one,

## 2020-11-12 NOTE — PLAN OF CARE
AVSS ex BP elevated, antihypertensives helping some. Rhythms sinus. L/S with expiratory wheezes, Nebs helping. Sating well on RA. Pulmonary toilet encouraged. Incisions frederic. Electrolytes repletion complete. Voiding adequately on own and having BMs. OOB and ambulating in halls. Pain fairly controlled. Accucheck meter use and Diabetes teaching done. Will continue to monitor

## 2020-11-12 NOTE — CONSULTS
Care Management Initial Consult    General Information  Assessment completed with: Patient, Radu  Type of CM/SW Visit: Initial Assessment  Primary Care Provider verified and updated as needed: Yes(would like to establish care at [facility])   Readmission within the last 30 days: no previous admission in last 30 days      Reason for Consult: discharge planning  Advance Care Planning: Advance Care Planning Reviewed: verified with patient          Communication Assessment  Patient's communication style: spoken language (English or Bilingual)    Hearing Difficulty or Deaf: no   Wear Glasses or Blind: yes    Cognitive  Cognitive/Neuro/Behavioral: WDL                      Living Environment:   People in home: spouse  Yu  Current living Arrangements: apartment      Able to return to prior arrangements: yes       Family/Social Support:  Care provided by: self  Provides care for: no one  Marital Status:   Wife;Children(Yu and Martha)  Yu       Description of Support System: Supportive;Involved    Support Assessment: Adequate family and caregiver support;Adequate social supports    Current Resources:   Skilled Home Care Services:    Community Resources:    Equipment currently used at home: none  Supplies currently used at home:      Employment/Financial:  Employment Status: retired        Financial Concerns: No concerns identified           Lifestyle & Psychosocial Needs:        Socioeconomic History     Marital status:      Spouse name: Not on file     Number of children: Not on file     Years of education: Not on file     Highest education level: Not on file     Tobacco Use     Smoking status: Never Smoker     Smokeless tobacco: Former User   Substance and Sexual Activity     Alcohol use: Yes     Comment: 1 beer per week       Functional Status:  Prior to admission patient needed assistance:   Dependent ADLs:: Independent  Dependent IADLs:: Independent       Mental Health Status:           Chemical Dependency Status:                Values/Beliefs:  Spiritual, Cultural Beliefs, [employer] Practices, Values that affect care: no               Additional Information:  Provider has ordered glucometer for discharge. Will obtain new PCP for patient .     Lucy Dye RN   [facility]   Phone [phone]

## 2020-11-12 NOTE — PROGRESS NOTES
[facility]    Medicine Progress Note - Hospitalist Service       Date of Admission:  11/5/2020  Assessment & Plan         Adrian Petty is a 60 year old male with a history of CAD status post stenting at [facility] in [place] as recent as 2013, hypertension, hyperlipidemia, prostate cancer status post surgery, chemo, and radiation, and new diagnosis of type 2 diabetes who has been experiencing worsening exertional shortness of breath and chest discomfort.  Cardiology was following the patient and moved up his elective/outpatient angiogram 11/5.  Subsequent to the angiogram which showed multivessel disease, patient was being evaluated for CABG consideration with ultrasound of the legs and neck when he experienced sudden onset of shortness of breath, chest pain, nausea and vomiting.  He is subsequently admitted with nitroglycerin infusion. He is currently s/p CABG on 11/9/20.  .    Multivessel CAD, NSTEMI s/p CABG x 3 (LIMA-LAD< SV-Diag, SV-PDA) on 11/9  Know hx of CAD with prior stenting most recent 2013  Patient was extubated POD#0. EBL 450cc.  - routine post op care per CV surgery  - current meds include- ASA 325mg daily, Metoprolol increased to 50mg BID, Crestor 40mg daily, Losartan 25mg daily  - CV surgery is adjusting cardiac/bp meds, bp currently on the higher side  - continue tele, encouraged IS use, ambulation    Hypertension  Hyperlipidemia  meds as above    T2 Diabetes Mellitus-uncontrolled  A1c 8.0% 11/4.   - insulin drip discontinued on 11/11  - Metformin 500mg BID started today per CV surger  - continue with sliding scale insulin   - Prior to discharge will need diabetic education and proper follow up at discharge  - will need to establish primary care locally for continued managment    History of prostate cancer, s/p resection, chemo, and radiation  Noted. No change in management while in hospital for now.        Diet: Regular Diet Adult    DVT Prophylaxis:  Heparin SQ  Kraft Catheter: not present  Code Status: Full Code           Disposition Plan   Expected discharge: per CV surgery. ,     Entered: Ida Be MD 11/12/2020, 3:17 PM       The patient's care was discussed with the Bedside Nurse, Patient and Patient's Family.    Ida Be MD  Hospitalist Service  [facility]  Contact information available via [facility] Paging/Directory    ______________________________________________________________________    Interval History   Some wheezing earlier today, feels improved currently.   Pain controlled.       Data reviewed today: I reviewed all medications, new labs and imaging results over the last 24 hours. I personally reviewed no images or EKG's today.    Physical Exam   Vital Signs: Temp: 98.2  F (36.8  C) Temp src: Oral BP: (!) 175/88(after gait with CR ) Pulse: 85   Resp: 18 SpO2: 96 % O2 Device: None (Room air)    Weight: 233 lbs 11 oz  General Appearance: Alert, awake and no apparent distress  Respiratory: diminished BS at the bases, otherwise clear, no wheezing  Cardiovascular: regular rate and rhythm, +LE edema  GI: soft and non-tender  Skin: warm and dry      Data   Recent Labs   Lab 11/12/20  0846 11/11/20  1438 11/11/20  0755 11/10/20  0355 11/09/20  1604 11/09/20  1425 11/09/20  1425 11/06/20  0043 11/06/20  0043 11/05/20 2059 11/05/20  1656   WBC 9.3  --  12.5* 15.5* 11.7*  --  14.9*   < >  --   --   --    HGB 12.4*  --  12.5* 13.2* 11.8*   < > 9.9*   < >  --   --   --    MCV 89  --  89 88 88  --  88   < >  --   --   --      --  209 194 150  --  163   < >  --   --   --    INR  --   --   --   --  1.19*  --  1.42*  --   --   --  1.07     --  135 136 139   < > 141   < >  --   --   --    POTASSIUM 3.8 3.8 3.2* 3.8 3.8   < > 3.2*   < >  --   --   --    CHLORIDE 104  --  106 108 109  --  110*   < >  --   --   --    CO2 25  --  23 25 25  --  22   < >  --   --   --    BUN 17  --  20 11 11  --  12   < >  --    --   --    CR 0.86  --  1.12 0.76 0.89  --  0.91   < >  --   --   --    ANIONGAP 5  --  6 3 5  --  9   < >  --   --   --    NADEEN 8.6  --  8.8 7.8* 7.9*  --  8.0*   < >  --   --   --    *  --  114* 132* 230*   < > 221*   < >  --   --   --    ALBUMIN  --   --   --  3.3* 3.2*  --   --   --   --   --   --    PROTTOTAL  --   --   --  6.0* 5.8*  --   --   --   --   --   --    BILITOTAL  --   --   --  0.9 0.7  --   --   --   --   --   --    ALKPHOS  --   --   --  43 41  --   --   --   --   --   --    ALT  --   --   --  35 34  --   --   --   --   --   --    AST  --   --   --  36 44  --   --   --   --   --   --    TROPI  --   --   --   --   --   --   --   --  0.127* 0.043 <0.015    < > = values in this interval not displayed.     Recent Results (from the past 24 hour(s))   XR Chest 2 Views    Narrative    CHEST TWO VIEWS  11/12/2020 7:45 AM     HISTORY: Status post CABG, removal of chest tubes.    COMPARISON: Chest x-ray on 11/10/2020      Impression    IMPRESSION: PA and lateral views of the chest were obtained.  Postsurgical changes of cardiac surgery with median sternotomy wires  and surgical clips. Enlarged cardiac silhouette. Mild left basilar  pulmonary opacities, likely atelectasis. Platelike pulmonary opacity  projects over the left midlung, likely atelectatic. Small left apical  pneumothorax status post chest tube removal. No right pneumothorax. No  significant pleural effusion.    RUPESH GAGE MD     Medications     dextrose       dextrose 5% and 0.45% NaCl + KCl 20 mEq/L Stopped (11/10/20 1747)       aspirin  325 mg Oral Daily     busPIRone  10 mg Oral TID     furosemide  40 mg Oral Daily     guaiFENesin  600 mg Oral BID     heparin ANTICOAGULANT  5,000 Units Subcutaneous Q8H     influenza recomb quadrivalent PF  0.5 mL Intramuscular Prior to discharge     insulin aspart  1-7 Units Subcutaneous TID AC     insulin aspart  1-5 Units Subcutaneous At Bedtime     ipratropium - albuterol 0.5 mg/2.5 mg/3 mL   3 mL Nebulization 4x daily     lidocaine  2 patch Transdermal Q24H     lidocaine   Transdermal Q8H     losartan  25 mg Oral Daily     metFORMIN  500 mg Oral BID w/meals     metoprolol tartrate  50 mg Oral BID     pantoprazole  40 mg Oral QAM AC     rosuvastatin  40 mg Oral Daily     senna-docusate  1 tablet Oral BID    Or     senna-docusate  2 tablet Oral BID     sertraline  50 mg Oral Daily     sodium chloride (PF)  3 mL Intracatheter Q8H

## 2020-11-12 NOTE — PROGRESS NOTES
[facility]  Cardiovascular and Thoracic Surgery Daily Note          Assessment and Plan:   POD#3 s/p CABG x 3 (LIMA to LAD, SVG to diag, SVG to PDA) by Dr Herrera on 20  -CVS: -160/70-90s, HR 90s, lopressor 50 mg PO bid, aspirin 325 mg, crestor 40 mg (PTA cozaar 50 mg, dyazide 37.5/25 mg and norvasc 10 mg), started losartan 25 mg PO daily, increase lasix to 40 mg PO daily  -Resp: Extubated 20 at 1710, saturating well on NC, working with IS, wean O2 as able  -Neuro:  Moving all extremities, grossly intact  -Renal: good UO, lawton removed am , voiding with no problems  Creatinine   Date Value Ref Range Status   2020 1.12 0.66 - 1.25 mg/dL Final   -GI:  Continue bowel regimen  -:  Lawton in place, Cr 1.12  -Endo: Hgb A1C 7.7 (), sliding scale insulin, will start metformin 500 mg PO bid, newly diagnosed diabetic, diabetic education  -FEN: replete lytes as needed, Na 135, K 3.2  -ID: Temp (24hrs), Av.4  F (36.3  C), Min:96.6  F (35.9  C), Max:98.6  F (37  C)     WBC   Date Value Ref Range Status   2020 9.3 4.0 - 11.0 10e9/L Final   ]  -Heme:   Hemoglobin   Date Value Ref Range Status   2020 12.4 (L) 13.3 - 17.7 g/dL Final   -Proph: PCD, change protonix to 40 mg PO, heparin subcutaneous tid  -Dispo: Encouraged IS/TCDB/sternal precautions. Starting losartan 25 mg PO daily. Increasing lasix. Will start metformin 500 mg PO bid. Looking at likely discharge to home on Friday/Saturday. Needs improved BP control, diabetic education, and improved strength prior to discharge. Add ultram for pain control. Continue to monitor.           Interval History:   States that pain has been worse. Discussed need for pain meds. Denies any hallucinations. Breathing is ok. Working with IS/flutter valve. Appetite is ok. Denies any nausea. +BM. Denies any popping/clicking in his breast bone. Ambulated x 1. Did not sleep very well.         Medications:       acetaminophen  975 mg Oral  Q8H     aspirin  325 mg Oral Daily     busPIRone  10 mg Oral TID     furosemide  40 mg Oral Daily     gabapentin  300 mg Oral BID     heparin ANTICOAGULANT  5,000 Units Subcutaneous Q8H     influenza recomb quadrivalent PF  0.5 mL Intramuscular Prior to discharge     insulin aspart  1-7 Units Subcutaneous TID AC     insulin aspart  1-5 Units Subcutaneous At Bedtime     lidocaine  2 patch Transdermal Q24H     lidocaine   Transdermal Q8H     losartan  25 mg Oral Daily     metoprolol tartrate  50 mg Oral BID     pantoprazole  40 mg Oral QAM AC     rosuvastatin  40 mg Oral Daily     senna-docusate  1 tablet Oral BID    Or     senna-docusate  2 tablet Oral BID     sertraline  50 mg Oral Daily     sodium chloride (PF)  3 mL Intracatheter Q8H     acetaminophen, dextrose, glucose **OR** dextrose **OR** glucagon, hydrALAZINE, HYDROmorphone, hydrOXYzine, lidocaine 4%, lidocaine (buffered or not buffered), melatonin, methocarbamol, naloxone, ondansetron **OR** ondansetron, oxyCODONE, sodium chloride (PF)          Physical Exam:   Vitals were reviewed  Blood pressure (!) 160/98, pulse 98, temperature 98  F (36.7  C), temperature source Oral, resp. rate 18, weight 106 kg (233 lb 11 oz), SpO2 94 %.  Gen: up in chair, comfortable, +O2    Lungs: fairly clear, IS 1000 ml    Cardiovascular: RRR, telemetry SR 90s, +edema LE    Abdomen: BS+, NT, mild distended, soft    Derm: sternal incision D/I   L LE incisions D/I    CT: removed    CXR (11/12/20) - chest tubes removed, per report trace apical L sided pneumothorax    Carotid US (11/5/20) - R ICA 50-69% stenosis   L ICA < than 50% stenosis    Echo (11/5/20) - normal LV size and systolic function, EF 60-65%   No regional WMA   Normal RV size and function   No significant valve disease    Weight:   Vitals:    11/09/20 0551 11/10/20 0215 11/10/20 1850 11/11/20 0720   Weight: 103.4 kg (228 lb) 104.6 kg (230 lb 9.6 oz) 106.7 kg (235 lb 3.2 oz) 106.5 kg (234 lb 14.4 oz)    11/12/20 0300    Weight: 106 kg (233 lb 11 oz)            Data:   Labs:   Lab Results   Component Value Date    WBC 15.5 11/10/2020     Lab Results   Component Value Date    RBC 4.49 11/10/2020     Lab Results   Component Value Date    HGB 13.2 11/10/2020     Lab Results   Component Value Date    HCT 39.4 11/10/2020     No components found for: MCT  Lab Results   Component Value Date    MCV 88 11/10/2020     Lab Results   Component Value Date    MCH 29.4 11/10/2020     Lab Results   Component Value Date    MCHC 33.5 11/10/2020     Lab Results   Component Value Date    RDW 13.7 11/10/2020     Lab Results   Component Value Date     11/10/2020       Last Basic Metabolic Panel:  Lab Results   Component Value Date     11/10/2020      Lab Results   Component Value Date    POTASSIUM 3.8 11/10/2020     Lab Results   Component Value Date    CHLORIDE 108 11/10/2020     Lab Results   Component Value Date    NADEEN 7.8 11/10/2020     Lab Results   Component Value Date    CO2 25 11/10/2020     Lab Results   Component Value Date    BUN 11 11/10/2020     Lab Results   Component Value Date    CR 0.76 11/10/2020     Lab Results   Component Value Date     11/10/2020     Juan Jones PA-C  [phone]

## 2020-11-12 NOTE — PLAN OF CARE
Vital signs stable on 2 L NC except elevated BP. Alert and oriented. Lung sounds diminished with some expiratory wheezing. Bowel sounds active, flatus present. Sternal incision bruised open to air. Pain controlled with tylenol. Up assist of one to bathroom. Tolerating diet. CMS/neuros intact. Tele normal sinus.

## 2020-11-13 ENCOUNTER — APPOINTMENT (OUTPATIENT)
Dept: PHYSICAL THERAPY | Facility: CLINIC | Age: 60
End: 2020-11-13
Attending: INTERNAL MEDICINE
Payer: COMMERCIAL

## 2020-11-13 LAB
GLUCOSE BLDC GLUCOMTR-MCNC: 182 MG/DL (ref 70–99)
GLUCOSE BLDC GLUCOMTR-MCNC: 193 MG/DL (ref 70–99)
GLUCOSE BLDC GLUCOMTR-MCNC: 201 MG/DL (ref 70–99)
GLUCOSE BLDC GLUCOMTR-MCNC: 205 MG/DL (ref 70–99)
INTERPRETATION ECG - MUSE: NORMAL
POTASSIUM SERPL-SCNC: 3.6 MMOL/L (ref 3.4–5.3)

## 2020-11-13 PROCEDURE — 97110 THERAPEUTIC EXERCISES: CPT | Mod: GP | Performed by: PHYSICAL THERAPIST

## 2020-11-13 PROCEDURE — 999N001017 HC STATISTIC GLUCOSE BY METER IP

## 2020-11-13 PROCEDURE — 250N000009 HC RX 250: Performed by: PHYSICIAN ASSISTANT

## 2020-11-13 PROCEDURE — 36415 COLL VENOUS BLD VENIPUNCTURE: CPT | Performed by: SURGERY

## 2020-11-13 PROCEDURE — 250N000011 HC RX IP 250 OP 636: Performed by: SURGERY

## 2020-11-13 PROCEDURE — 120N000001 HC R&B MED SURG/OB

## 2020-11-13 PROCEDURE — 250N000013 HC RX MED GY IP 250 OP 250 PS 637: Performed by: PHYSICIAN ASSISTANT

## 2020-11-13 PROCEDURE — 99232 SBSQ HOSP IP/OBS MODERATE 35: CPT | Performed by: INTERNAL MEDICINE

## 2020-11-13 PROCEDURE — 97530 THERAPEUTIC ACTIVITIES: CPT | Mod: GP | Performed by: PHYSICAL THERAPIST

## 2020-11-13 PROCEDURE — 94640 AIRWAY INHALATION TREATMENT: CPT | Mod: 76

## 2020-11-13 PROCEDURE — 250N000013 HC RX MED GY IP 250 OP 250 PS 637: Performed by: SURGERY

## 2020-11-13 PROCEDURE — 84132 ASSAY OF SERUM POTASSIUM: CPT | Performed by: SURGERY

## 2020-11-13 PROCEDURE — 250N000011 HC RX IP 250 OP 636: Performed by: PHYSICIAN ASSISTANT

## 2020-11-13 PROCEDURE — 99207 PR CDG-MDM COMPONENT: MEETS MODERATE - UP CODED: CPT | Performed by: INTERNAL MEDICINE

## 2020-11-13 PROCEDURE — 94640 AIRWAY INHALATION TREATMENT: CPT

## 2020-11-13 PROCEDURE — 999N000157 HC STATISTIC RCP TIME EA 10 MIN

## 2020-11-13 RX ORDER — POTASSIUM CHLORIDE 1500 MG/1
20 TABLET, EXTENDED RELEASE ORAL ONCE
Status: COMPLETED | OUTPATIENT
Start: 2020-11-13 | End: 2020-11-13

## 2020-11-13 RX ORDER — LOSARTAN POTASSIUM 50 MG/1
50 TABLET ORAL DAILY
Status: DISCONTINUED | OUTPATIENT
Start: 2020-11-13 | End: 2020-11-14 | Stop reason: HOSPADM

## 2020-11-13 RX ORDER — AMLODIPINE BESYLATE 2.5 MG/1
2.5 TABLET ORAL DAILY
Status: DISCONTINUED | OUTPATIENT
Start: 2020-11-13 | End: 2020-11-14

## 2020-11-13 RX ORDER — METOPROLOL TARTRATE 25 MG/1
25 TABLET, FILM COATED ORAL ONCE
Status: COMPLETED | OUTPATIENT
Start: 2020-11-13 | End: 2020-11-13

## 2020-11-13 RX ORDER — METOPROLOL TARTRATE 100 MG
100 TABLET ORAL 2 TIMES DAILY
Status: DISCONTINUED | OUTPATIENT
Start: 2020-11-13 | End: 2020-11-14 | Stop reason: HOSPADM

## 2020-11-13 RX ORDER — METOPROLOL TARTRATE 100 MG
100 TABLET ORAL 2 TIMES DAILY
Status: DISCONTINUED | OUTPATIENT
Start: 2020-11-13 | End: 2020-11-13

## 2020-11-13 RX ORDER — ACETAMINOPHEN 325 MG/1
975 TABLET ORAL EVERY 8 HOURS
Status: DISCONTINUED | OUTPATIENT
Start: 2020-11-13 | End: 2020-11-14 | Stop reason: HOSPADM

## 2020-11-13 RX ADMIN — ROSUVASTATIN CALCIUM 40 MG: 20 TABLET, FILM COATED ORAL at 09:52

## 2020-11-13 RX ADMIN — HYDRALAZINE HYDROCHLORIDE 20 MG: 20 INJECTION INTRAMUSCULAR; INTRAVENOUS at 00:37

## 2020-11-13 RX ADMIN — LOSARTAN POTASSIUM 50 MG: 50 TABLET, FILM COATED ORAL at 09:39

## 2020-11-13 RX ADMIN — INSULIN ASPART 1 UNITS: 100 INJECTION, SOLUTION INTRAVENOUS; SUBCUTANEOUS at 13:00

## 2020-11-13 RX ADMIN — ACETAMINOPHEN 975 MG: 325 TABLET, FILM COATED ORAL at 18:07

## 2020-11-13 RX ADMIN — POTASSIUM CHLORIDE 20 MEQ: 1500 TABLET, EXTENDED RELEASE ORAL at 20:35

## 2020-11-13 RX ADMIN — AMLODIPINE BESYLATE 2.5 MG: 2.5 TABLET ORAL at 12:58

## 2020-11-13 RX ADMIN — METFORMIN HYDROCHLORIDE 500 MG: 500 TABLET, FILM COATED ORAL at 09:39

## 2020-11-13 RX ADMIN — HEPARIN SODIUM 5000 UNITS: 5000 INJECTION, SOLUTION INTRAVENOUS; SUBCUTANEOUS at 22:37

## 2020-11-13 RX ADMIN — BUSPIRONE HYDROCHLORIDE 10 MG: 10 TABLET ORAL at 22:37

## 2020-11-13 RX ADMIN — METOPROLOL TARTRATE 100 MG: 100 TABLET, FILM COATED ORAL at 20:35

## 2020-11-13 RX ADMIN — METOPROLOL TARTRATE 25 MG: 25 TABLET, FILM COATED ORAL at 09:40

## 2020-11-13 RX ADMIN — IPRATROPIUM BROMIDE AND ALBUTEROL SULFATE 3 ML: .5; 3 SOLUTION RESPIRATORY (INHALATION) at 08:17

## 2020-11-13 RX ADMIN — ASPIRIN 325 MG: 325 TABLET, COATED ORAL at 09:39

## 2020-11-13 RX ADMIN — DOCUSATE SODIUM 50 MG AND SENNOSIDES 8.6 MG 1 TABLET: 8.6; 5 TABLET, FILM COATED ORAL at 09:37

## 2020-11-13 RX ADMIN — BUSPIRONE HYDROCHLORIDE 10 MG: 10 TABLET ORAL at 09:39

## 2020-11-13 RX ADMIN — BUSPIRONE HYDROCHLORIDE 10 MG: 10 TABLET ORAL at 18:08

## 2020-11-13 RX ADMIN — GUAIFENESIN 600 MG: 600 TABLET, EXTENDED RELEASE ORAL at 20:35

## 2020-11-13 RX ADMIN — PANTOPRAZOLE SODIUM 40 MG: 40 TABLET, DELAYED RELEASE ORAL at 07:14

## 2020-11-13 RX ADMIN — INSULIN ASPART 2 UNITS: 100 INJECTION, SOLUTION INTRAVENOUS; SUBCUTANEOUS at 18:09

## 2020-11-13 RX ADMIN — IPRATROPIUM BROMIDE AND ALBUTEROL SULFATE 3 ML: .5; 3 SOLUTION RESPIRATORY (INHALATION) at 11:13

## 2020-11-13 RX ADMIN — LIDOCAINE 2 PATCH: 560 PATCH PERCUTANEOUS; TOPICAL; TRANSDERMAL at 09:51

## 2020-11-13 RX ADMIN — METOPROLOL TARTRATE 75 MG: 50 TABLET, FILM COATED ORAL at 07:13

## 2020-11-13 RX ADMIN — INSULIN ASPART 2 UNITS: 100 INJECTION, SOLUTION INTRAVENOUS; SUBCUTANEOUS at 09:44

## 2020-11-13 RX ADMIN — GUAIFENESIN 600 MG: 600 TABLET, EXTENDED RELEASE ORAL at 09:37

## 2020-11-13 RX ADMIN — HYDRALAZINE HYDROCHLORIDE 20 MG: 20 INJECTION INTRAMUSCULAR; INTRAVENOUS at 17:12

## 2020-11-13 RX ADMIN — HEPARIN SODIUM 5000 UNITS: 5000 INJECTION, SOLUTION INTRAVENOUS; SUBCUTANEOUS at 05:00

## 2020-11-13 RX ADMIN — SERTRALINE HYDROCHLORIDE 50 MG: 50 TABLET ORAL at 09:37

## 2020-11-13 RX ADMIN — FUROSEMIDE 40 MG: 40 TABLET ORAL at 09:39

## 2020-11-13 RX ADMIN — ACETAMINOPHEN 975 MG: 325 TABLET, FILM COATED ORAL at 09:36

## 2020-11-13 RX ADMIN — METFORMIN HYDROCHLORIDE 500 MG: 500 TABLET, FILM COATED ORAL at 18:08

## 2020-11-13 RX ADMIN — HEPARIN SODIUM 5000 UNITS: 5000 INJECTION, SOLUTION INTRAVENOUS; SUBCUTANEOUS at 13:02

## 2020-11-13 ASSESSMENT — ACTIVITIES OF DAILY LIVING (ADL)
ADLS_ACUITY_SCORE: 17

## 2020-11-13 NOTE — PROGRESS NOTES
[facility]  Cardiovascular and Thoracic Surgery Daily Note          Assessment and Plan:   POD#4 s/p CABG x 3 (LIMA to LAD, SVG to diag, SVG to PDA) by Dr Herrera on 20  -CVS: -180s/60-90s, HR 90-100s, increase lopressor to 100 mg PO bid, aspirin 325 mg, crestor 40 mg (PTA cozaar 50 mg, dyazide 37.5/25 mg and norvasc 10 mg), increased losartan to 50 mg PO daily, lasix to 40 mg PO daily  -Resp: Extubated 20 at 1710, saturating well on RA, working with IS  -Neuro:  Moving all extremities, grossly intact  -Renal: good UO, lawton removed am , voiding with no problems  Creatinine   Date Value Ref Range Status   2020 0.86 0.66 - 1.25 mg/dL Final   -GI:  Continue bowel regimen  -:  Lawton in place, Cr 1.12  -Endo: Hgb A1C 7.7 (), sliding scale insulin, metformin 500 mg PO bid, newly diagnosed diabetic, diabetic education  -FEN: replete lytes as needed, Na 135, K 3.2  -ID: Temp (24hrs), Av.4  F (36.3  C), Min:96.6  F (35.9  C), Max:98.6  F (37  C)     WBC   Date Value Ref Range Status   2020 9.3 4.0 - 11.0 10e9/L Final   ]  -Heme:   Hemoglobin   Date Value Ref Range Status   2020 12.4 (L) 13.3 - 17.7 g/dL Final   -Proph: PCD, protonix 40 mg PO, heparin subcutaneous tid  -Dispo: Encouraged IS/TCDB/sternal precautions. Changing BP meds.Continue metformin 500 mg PO bid. Looking at likely discharge to home on Saturday. Needs improved BP control, diabetic education, and improved strength prior to discharge. Continue to monitor.           Interval History:   States that pain has been controlled. Denies any hallucinations. Breathing is ok. Working with IS/flutter valve. No further wheezes after nebs and mucinex. Weight is down. Appetite is ok. Denies any nausea. +BMs. Denies any popping/clicking in his breast bone. Ambulating. States that he has not been able to sleep very well in his room. Temp is not regulated very well. Has BP cuff at home.          Medications:        acetaminophen  975 mg Oral Q8H     aspirin  325 mg Oral Daily     busPIRone  10 mg Oral TID     furosemide  40 mg Oral Daily     guaiFENesin  600 mg Oral BID     heparin ANTICOAGULANT  5,000 Units Subcutaneous Q8H     influenza recomb quadrivalent PF  0.5 mL Intramuscular Prior to discharge     insulin aspart  1-7 Units Subcutaneous TID AC     insulin aspart  1-5 Units Subcutaneous At Bedtime     ipratropium - albuterol 0.5 mg/2.5 mg/3 mL  3 mL Nebulization 4x daily     lidocaine  2 patch Transdermal Q24H     lidocaine   Transdermal Q8H     losartan  50 mg Oral Daily     metFORMIN  500 mg Oral BID w/meals     metoprolol tartrate  100 mg Oral BID     metoprolol tartrate  25 mg Oral Once     pantoprazole  40 mg Oral QAM AC     rosuvastatin  40 mg Oral Daily     senna-docusate  1 tablet Oral BID    Or     senna-docusate  2 tablet Oral BID     sertraline  50 mg Oral Daily     sodium chloride (PF)  3 mL Intracatheter Q8H     acetaminophen, dextrose, glucose **OR** dextrose **OR** glucagon, hydrALAZINE, HYDROmorphone, hydrOXYzine, lidocaine 4%, lidocaine (buffered or not buffered), melatonin, methocarbamol, naloxone, ondansetron **OR** ondansetron, oxyCODONE, sodium chloride (PF), traMADol          Physical Exam:   Vitals were reviewed  Blood pressure (!) 185/96, pulse 101, temperature 98.1  F (36.7  C), temperature source Oral, resp. rate 18, weight 105 kg (231 lb 8 oz), SpO2 96 %.  Gen: up at side of bed, comfortable, -O2    Lungs: CTA    Cardiovascular: RRR, telemetry SR 90s, +trace edema LE    Abdomen: BS+, NT, mild distended, soft    Derm: sternal incision D/I   L LE incisions D/I    CT: removed    CXR (11/12/20) - chest tubes removed, per report trace apical L sided pneumothorax    Carotid US (11/5/20) - R ICA 50-69% stenosis   L ICA < than 50% stenosis    Echo (11/5/20) - normal LV size and systolic function, EF 60-65%   No regional WMA   Normal RV size and function   No significant valve disease    Weight:    Vitals:    11/10/20 0215 11/10/20 1850 11/11/20 0720 11/12/20 0300   Weight: 104.6 kg (230 lb 9.6 oz) 106.7 kg (235 lb 3.2 oz) 106.5 kg (234 lb 14.4 oz) 106 kg (233 lb 11 oz)    11/13/20 0642   Weight: 105 kg (231 lb 8 oz)            Data:   Labs:   Lab Results   Component Value Date    WBC 15.5 11/10/2020     Lab Results   Component Value Date    RBC 4.49 11/10/2020     Lab Results   Component Value Date    HGB 13.2 11/10/2020     Lab Results   Component Value Date    HCT 39.4 11/10/2020     No components found for: MCT  Lab Results   Component Value Date    MCV 88 11/10/2020     Lab Results   Component Value Date    MCH 29.4 11/10/2020     Lab Results   Component Value Date    MCHC 33.5 11/10/2020     Lab Results   Component Value Date    RDW 13.7 11/10/2020     Lab Results   Component Value Date     11/10/2020       Last Basic Metabolic Panel:  Lab Results   Component Value Date     11/10/2020      Lab Results   Component Value Date    POTASSIUM 3.8 11/10/2020     Lab Results   Component Value Date    CHLORIDE 108 11/10/2020     Lab Results   Component Value Date    NADEEN 7.8 11/10/2020     Lab Results   Component Value Date    CO2 25 11/10/2020     Lab Results   Component Value Date    BUN 11 11/10/2020     Lab Results   Component Value Date    CR 0.76 11/10/2020     Lab Results   Component Value Date     11/10/2020     Juan Jones PA-C  [phone]

## 2020-11-13 NOTE — PROVIDER NOTIFICATION
MD Notification    Notified Person: MD    Notified Person Name: Dr. Olvera    Notification Date/Time: 11/12/2020 at 1800    Notification Interaction: phone    Purpose of Notification: Pt BP elevated, headache and nausea    Orders Received: Increase BID metoprolol dose from 50mg to 75mg and increase prn hydralazine dose to 20mg and give both meds now.     Comments:

## 2020-11-13 NOTE — PLAN OF CARE
VSS ex HTN.  IV hydralazine given x1 overnight, metoprolol given early this am.  Up SBA, denies pain.  +void and BM, edu videos watched.  Hopeful for discharge soon.

## 2020-11-13 NOTE — PROGRESS NOTES
[facility]    Medicine Progress Note - Hospitalist Service       Date of Admission:  11/5/2020  Assessment & Plan         Adrian Petty is a 60 year old male with a history of CAD status post stenting at [facility] in [place] as recent as 2013, hypertension, hyperlipidemia, prostate cancer status post surgery, chemo, and radiation, and new diagnosis of type 2 diabetes who has been experiencing worsening exertional shortness of breath and chest discomfort.  Cardiology was following the patient and moved up his elective/outpatient angiogram 11/5.  Subsequent to the angiogram which showed multivessel disease, patient was being evaluated for CABG consideration with ultrasound of the legs and neck when he experienced sudden onset of shortness of breath, chest pain, nausea and vomiting.  He is subsequently admitted with nitroglycerin infusion. He is currently s/p CABG on 11/9/20.  .    Multivessel CAD, NSTEMI s/p CABG x 3 (LIMA-LAD< SV-Diag, SV-PDA) on 11/9  Know hx of CAD with prior stenting most recent 2013  Patient was extubated POD#0. EBL 450cc.  - routine post op care per CV surgery  - current meds include- ASA 325mg daily, Metoprolol increased to 100mg BID, Crestor 40mg daily, Losartan 50mg daily, Amlodipine 2.5mg daily  - CV surgery is adjusting cardiac/bp meds  - continue tele, encouraged IS use, ambulation    Hypertension  Hyperlipidemia  meds as above    T2 Diabetes Mellitus-uncontrolled  A1c 8.0% 11/4.   - insulin drip discontinued on 11/11  - Metformin 500mg BID started, tolerating  - continue with sliding scale insulin   -PCP follow-up is arranged for patient    History of prostate cancer, s/p resection, chemo, and radiation  Noted. No change in management while in hospital for now.        Diet: Regular Diet Adult    DVT Prophylaxis: Heparin SQ  Kraft Catheter: not present  Code Status: Full Code           Disposition Plan   Expected discharge: per CV surgery planning  for 11/14,     Entered: Ida Be MD 11/13/2020, 3:01 PM       The patient's care was discussed with the Patient.    Ida Be MD  Hospitalist Service  [facility]  Contact information available via [facility] Paging/Directory    ______________________________________________________________________    Interval History   Tolerating Metformin.  Denies diarrhea or abdominal pain.  He however reports felt nauseous after taking his morning medication without food.  Feeling much improved this afternoon.  He also reports he has not slept well the last 2 or 3 days.  He does take melatonin up to 5 mg at night at home.  Offered to increase his melatonin here in the hospital which he declined at this time since he is anticipating discharge home tomorrow.    Data reviewed today: I reviewed all medications, new labs and imaging results over the last 24 hours. I personally reviewed no images or EKG's today.    Physical Exam   Vital Signs: Temp: 98.3  F (36.8  C) Temp src: Oral BP: (!) 146/80 Pulse: 91   Resp: 18 SpO2: 95 % O2 Device: None (Room air)    Weight: 231 lbs 8 oz  General Appearance: Alert, awake and no apparent distress  Respiratory: Clear to auscultation bilaterally no wheezing   cardiovascular: regular rate and rhythm, +LE edema  GI: soft and non-tender  Skin: warm and dry      Data   Recent Labs   Lab 11/13/20  0859 11/12/20  0846 11/11/20  1438 11/11/20  0755 11/10/20  0355 11/09/20  1604 11/09/20  1425 11/09/20  1425   WBC  --  9.3  --  12.5* 15.5* 11.7*  --  14.9*   HGB  --  12.4*  --  12.5* 13.2* 11.8*   < > 9.9*   MCV  --  89  --  89 88 88  --  88   PLT  --  216  --  209 194 150  --  163   INR  --   --   --   --   --  1.19*  --  1.42*   NA  --  134  --  135 136 139   < > 141   POTASSIUM 3.6 3.8 3.8 3.2* 3.8 3.8   < > 3.2*   CHLORIDE  --  104  --  106 108 109  --  110*   CO2  --  25  --  23 25 25  --  22   BUN  --  17  --  20 11 11  --  12   CR  --  0.86  --  1.12 0.76  0.89  --  0.91   ANIONGAP  --  5  --  6 3 5  --  9   NADEEN  --  8.6  --  8.8 7.8* 7.9*  --  8.0*   GLC  --  200*  --  114* 132* 230*   < > 221*   ALBUMIN  --   --   --   --  3.3* 3.2*  --   --    PROTTOTAL  --   --   --   --  6.0* 5.8*  --   --    BILITOTAL  --   --   --   --  0.9 0.7  --   --    ALKPHOS  --   --   --   --  43 41  --   --    ALT  --   --   --   --  35 34  --   --    AST  --   --   --   --  36 44  --   --     < > = values in this interval not displayed.     No results found for this or any previous visit (from the past 24 hour(s)).  Medications     dextrose       dextrose 5% and 0.45% NaCl + KCl 20 mEq/L Stopped (11/10/20 9433)       acetaminophen  975 mg Oral Q8H     amLODIPine  2.5 mg Oral Daily     aspirin  325 mg Oral Daily     busPIRone  10 mg Oral TID     furosemide  40 mg Oral Daily     guaiFENesin  600 mg Oral BID     heparin ANTICOAGULANT  5,000 Units Subcutaneous Q8H     influenza recomb quadrivalent PF  0.5 mL Intramuscular Prior to discharge     insulin aspart  1-7 Units Subcutaneous TID AC     insulin aspart  1-5 Units Subcutaneous At Bedtime     lidocaine  2 patch Transdermal Q24H     lidocaine   Transdermal Q8H     losartan  50 mg Oral Daily     metFORMIN  500 mg Oral BID w/meals     metoprolol tartrate  100 mg Oral BID     pantoprazole  40 mg Oral QAM AC     rosuvastatin  40 mg Oral Daily     senna-docusate  1 tablet Oral BID    Or     senna-docusate  2 tablet Oral BID     sertraline  50 mg Oral Daily     sodium chloride (PF)  3 mL Intracatheter Q8H

## 2020-11-13 NOTE — PLAN OF CARE
Pt BP high at beginning of shift prn hydralazine given x2 and BP came down. Pt taking tylenol and robaxin prn for pain control. Tele is sinus tach. Chest tube sites are clean and open to air. Chest incision is clean dry and open to air.

## 2020-11-14 ENCOUNTER — APPOINTMENT (OUTPATIENT)
Dept: PHYSICAL THERAPY | Facility: CLINIC | Age: 60
End: 2020-11-14
Attending: INTERNAL MEDICINE
Payer: COMMERCIAL

## 2020-11-14 VITALS
BODY MASS INDEX: 32.68 KG/M2 | OXYGEN SATURATION: 98 % | SYSTOLIC BLOOD PRESSURE: 148 MMHG | RESPIRATION RATE: 16 BRPM | HEART RATE: 99 BPM | WEIGHT: 227.74 LBS | TEMPERATURE: 98.8 F | DIASTOLIC BLOOD PRESSURE: 83 MMHG

## 2020-11-14 LAB
GLUCOSE BLDC GLUCOMTR-MCNC: 170 MG/DL (ref 70–99)
GLUCOSE BLDC GLUCOMTR-MCNC: 176 MG/DL (ref 70–99)
POTASSIUM SERPL-SCNC: 3.5 MMOL/L (ref 3.4–5.3)

## 2020-11-14 PROCEDURE — 999N001017 HC STATISTIC GLUCOSE BY METER IP

## 2020-11-14 PROCEDURE — 250N000011 HC RX IP 250 OP 636: Performed by: PHYSICIAN ASSISTANT

## 2020-11-14 PROCEDURE — 36415 COLL VENOUS BLD VENIPUNCTURE: CPT | Performed by: SURGERY

## 2020-11-14 PROCEDURE — 250N000013 HC RX MED GY IP 250 OP 250 PS 637: Performed by: PHYSICIAN ASSISTANT

## 2020-11-14 PROCEDURE — 250N000011 HC RX IP 250 OP 636: Performed by: SURGERY

## 2020-11-14 PROCEDURE — 97530 THERAPEUTIC ACTIVITIES: CPT | Mod: GP

## 2020-11-14 PROCEDURE — 97110 THERAPEUTIC EXERCISES: CPT | Mod: GP

## 2020-11-14 PROCEDURE — 84132 ASSAY OF SERUM POTASSIUM: CPT | Performed by: SURGERY

## 2020-11-14 PROCEDURE — 250N000013 HC RX MED GY IP 250 OP 250 PS 637: Performed by: SURGERY

## 2020-11-14 RX ORDER — ACETAMINOPHEN 325 MG/1
650 TABLET ORAL EVERY 4 HOURS PRN
Start: 2020-11-14 | End: 2021-08-06

## 2020-11-14 RX ORDER — ASPIRIN 325 MG
325 TABLET, DELAYED RELEASE (ENTERIC COATED) ORAL DAILY
Start: 2020-11-14 | End: 2020-12-02

## 2020-11-14 RX ORDER — OXYCODONE HYDROCHLORIDE 5 MG/1
5-10 TABLET ORAL
Qty: 20 TABLET | Refills: 0 | Status: SHIPPED | OUTPATIENT
Start: 2020-11-14 | End: 2020-12-02

## 2020-11-14 RX ORDER — FUROSEMIDE 40 MG
40 TABLET ORAL DAILY
Qty: 5 TABLET | Refills: 0 | Status: SHIPPED | OUTPATIENT
Start: 2020-11-14 | End: 2020-12-02

## 2020-11-14 RX ORDER — POTASSIUM CHLORIDE 1500 MG/1
20 TABLET, EXTENDED RELEASE ORAL ONCE
Status: COMPLETED | OUTPATIENT
Start: 2020-11-14 | End: 2020-11-14

## 2020-11-14 RX ORDER — METOPROLOL TARTRATE 100 MG
100 TABLET ORAL 2 TIMES DAILY
Qty: 60 TABLET | Refills: 3 | Status: SHIPPED | OUTPATIENT
Start: 2020-11-14 | End: 2020-12-02

## 2020-11-14 RX ORDER — POTASSIUM CHLORIDE 750 MG/1
20 TABLET, EXTENDED RELEASE ORAL DAILY
Qty: 10 TABLET | Refills: 0 | Status: SHIPPED | OUTPATIENT
Start: 2020-11-14 | End: 2021-08-06

## 2020-11-14 RX ORDER — AMLODIPINE BESYLATE 5 MG/1
5 TABLET ORAL DAILY
Status: DISCONTINUED | OUTPATIENT
Start: 2020-11-14 | End: 2020-11-14 | Stop reason: HOSPADM

## 2020-11-14 RX ORDER — AMLODIPINE BESYLATE 5 MG/1
5 TABLET ORAL DAILY
Qty: 30 TABLET | Refills: 3 | Status: SHIPPED | OUTPATIENT
Start: 2020-11-14 | End: 2020-11-18

## 2020-11-14 RX ORDER — METHOCARBAMOL 750 MG/1
750 TABLET, FILM COATED ORAL 4 TIMES DAILY PRN
Qty: 40 TABLET | Refills: 0 | Status: SHIPPED | OUTPATIENT
Start: 2020-11-14 | End: 2020-12-02

## 2020-11-14 RX ADMIN — BUSPIRONE HYDROCHLORIDE 10 MG: 10 TABLET ORAL at 09:17

## 2020-11-14 RX ADMIN — ROSUVASTATIN CALCIUM 40 MG: 20 TABLET, FILM COATED ORAL at 09:16

## 2020-11-14 RX ADMIN — FUROSEMIDE 40 MG: 40 TABLET ORAL at 09:17

## 2020-11-14 RX ADMIN — METOPROLOL TARTRATE 100 MG: 100 TABLET, FILM COATED ORAL at 09:17

## 2020-11-14 RX ADMIN — ACETAMINOPHEN 975 MG: 325 TABLET, FILM COATED ORAL at 00:12

## 2020-11-14 RX ADMIN — SERTRALINE HYDROCHLORIDE 50 MG: 50 TABLET ORAL at 09:18

## 2020-11-14 RX ADMIN — GUAIFENESIN 600 MG: 600 TABLET, EXTENDED RELEASE ORAL at 09:17

## 2020-11-14 RX ADMIN — LOSARTAN POTASSIUM 50 MG: 50 TABLET, FILM COATED ORAL at 09:17

## 2020-11-14 RX ADMIN — HYDRALAZINE HYDROCHLORIDE 20 MG: 20 INJECTION INTRAMUSCULAR; INTRAVENOUS at 00:12

## 2020-11-14 RX ADMIN — ACETAMINOPHEN 975 MG: 325 TABLET, FILM COATED ORAL at 09:16

## 2020-11-14 RX ADMIN — LIDOCAINE 2 PATCH: 560 PATCH PERCUTANEOUS; TOPICAL; TRANSDERMAL at 09:13

## 2020-11-14 RX ADMIN — METFORMIN HYDROCHLORIDE 1000 MG: 500 TABLET, FILM COATED ORAL at 09:17

## 2020-11-14 RX ADMIN — HYDRALAZINE HYDROCHLORIDE 20 MG: 20 INJECTION INTRAMUSCULAR; INTRAVENOUS at 04:12

## 2020-11-14 RX ADMIN — INSULIN ASPART 1 UNITS: 100 INJECTION, SOLUTION INTRAVENOUS; SUBCUTANEOUS at 07:38

## 2020-11-14 RX ADMIN — ASPIRIN 325 MG: 325 TABLET, COATED ORAL at 09:18

## 2020-11-14 RX ADMIN — HEPARIN SODIUM 5000 UNITS: 5000 INJECTION, SOLUTION INTRAVENOUS; SUBCUTANEOUS at 06:32

## 2020-11-14 RX ADMIN — AMLODIPINE BESYLATE 5 MG: 5 TABLET ORAL at 09:16

## 2020-11-14 RX ADMIN — POTASSIUM CHLORIDE 20 MEQ: 1500 TABLET, EXTENDED RELEASE ORAL at 12:00

## 2020-11-14 RX ADMIN — PANTOPRAZOLE SODIUM 40 MG: 40 TABLET, DELAYED RELEASE ORAL at 06:32

## 2020-11-14 ASSESSMENT — ACTIVITIES OF DAILY LIVING (ADL)
ADLS_ACUITY_SCORE: 17

## 2020-11-14 NOTE — PROGRESS NOTES
Pt discharged to home with his wife per MD orders around 1230.  Pt's wife to drive him home.  Pt and his wife stated they understood discharge instructions including medication instructions.  Pt and his wife packed up the pt's belongings.

## 2020-11-14 NOTE — PROGRESS NOTES
[facility]  Cardiovascular and Thoracic Surgery Daily Note          Assessment and Plan:   POD#5 s/p CABG x 3 (LIMA to LAD, SVG to diag, SVG to PDA) by Dr Herrera on 20  -CVS: -160s/70-90s, HR 80-90s, lopressor 100 mg PO bid, aspirin 325 mg, crestor 40 mg (PTA cozaar 50 mg, dyazide 37.5/25 mg and norvasc 10 mg), losartan 50 mg PO daily, lasix 40 mg PO daily, increase amlodipine to 5 mg PO daily  -Resp: Extubated 20 at 1710, saturating well on RA, working with IS  -Neuro:  Moving all extremities, grossly intact  -Renal: good UO, lawton removed am , voiding with no problems  Creatinine   Date Value Ref Range Status   2020 0.86 0.66 - 1.25 mg/dL Final   -GI:  Continue bowel regimen  -:  Lawton in place, Cr 1.12  -Endo: Hgb A1C 7.7 (), sliding scale insulin, metformin 500 mg PO bid, newly diagnosed diabetic, diabetic education  -FEN: replete lytes as needed, Na 135, K 3.2  -ID: Temp (24hrs), Av.4  F (36.3  C), Min:96.6  F (35.9  C), Max:98.6  F (37  C)     WBC   Date Value Ref Range Status   2020 9.3 4.0 - 11.0 10e9/L Final   ]  -Heme:   Hemoglobin   Date Value Ref Range Status   2020 12.4 (L) 13.3 - 17.7 g/dL Final   -Proph: PCD, protonix 40 mg PO, heparin subcutaneous tid  -Dispo: Encouraged IS/TCDB/sternal precautions. Changing BP meds. Increase metformin to 1000 mg PO bid. Ok to discharge to home. Will call pt in am tomorrow to check his BP readings. Pt has BP cuff at home. Continue to monitor.           Interval History:   States that pain has been controlled. Denies any hallucinations. Breathing is ok. Working with IS/flutter valve. Appetite is ok. Denies any nausea. +BMs. Denies any popping/clicking in his breast bone. Ambulating. States that he has not been able to sleep very well in his room. Feels ready to discharge to home today.         Medications:       acetaminophen  975 mg Oral Q8H     amLODIPine  5 mg Oral Daily     aspirin  325 mg Oral  Daily     busPIRone  10 mg Oral TID     furosemide  40 mg Oral Daily     guaiFENesin  600 mg Oral BID     heparin ANTICOAGULANT  5,000 Units Subcutaneous Q8H     influenza recomb quadrivalent PF  0.5 mL Intramuscular Prior to discharge     insulin aspart  1-7 Units Subcutaneous TID AC     insulin aspart  1-5 Units Subcutaneous At Bedtime     lidocaine  2 patch Transdermal Q24H     lidocaine   Transdermal Q8H     losartan  50 mg Oral Daily     metFORMIN  500 mg Oral BID w/meals     metoprolol tartrate  100 mg Oral BID     pantoprazole  40 mg Oral QAM AC     rosuvastatin  40 mg Oral Daily     senna-docusate  1 tablet Oral BID    Or     senna-docusate  2 tablet Oral BID     sertraline  50 mg Oral Daily     sodium chloride (PF)  3 mL Intracatheter Q8H     acetaminophen, dextrose, glucose **OR** dextrose **OR** glucagon, hydrALAZINE, HYDROmorphone, hydrOXYzine, lidocaine 4%, lidocaine (buffered or not buffered), melatonin, methocarbamol, naloxone, ondansetron **OR** ondansetron, oxyCODONE, sodium chloride (PF), traMADol          Physical Exam:   Vitals were reviewed  Blood pressure (!) 163/78, pulse 101, temperature 98.8  F (37.1  C), temperature source Oral, resp. rate 16, weight 103.3 kg (227 lb 11.8 oz), SpO2 97 %.  Gen: up in chair, comfortable, -O2    Lungs: CTA, IS 1250 ml    Cardiovascular: RRR, telemetry SR 90s, +edema LE    Abdomen: BS+, NT, mild distended, soft    Derm: sternal incision D/I   L LE incisions D/I    CT: removed    CXR (11/12/20) - chest tubes removed, per report trace apical L sided pneumothorax    Carotid US (11/5/20) - R ICA 50-69% stenosis   L ICA < than 50% stenosis    Echo (11/5/20) - normal LV size and systolic function, EF 60-65%   No regional WMA   Normal RV size and function   No significant valve disease    Weight:   Vitals:    11/10/20 1850 11/11/20 0720 11/12/20 0300 11/13/20 0642   Weight: 106.7 kg (235 lb 3.2 oz) 106.5 kg (234 lb 14.4 oz) 106 kg (233 lb 11 oz) 105 kg (231 lb 8 oz)     11/14/20 0555   Weight: 103.3 kg (227 lb 11.8 oz)            Data:   Labs:   Lab Results   Component Value Date    WBC 15.5 11/10/2020     Lab Results   Component Value Date    RBC 4.49 11/10/2020     Lab Results   Component Value Date    HGB 13.2 11/10/2020     Lab Results   Component Value Date    HCT 39.4 11/10/2020     No components found for: MCT  Lab Results   Component Value Date    MCV 88 11/10/2020     Lab Results   Component Value Date    MCH 29.4 11/10/2020     Lab Results   Component Value Date    MCHC 33.5 11/10/2020     Lab Results   Component Value Date    RDW 13.7 11/10/2020     Lab Results   Component Value Date     11/10/2020       Last Basic Metabolic Panel:  Lab Results   Component Value Date     11/10/2020      Lab Results   Component Value Date    POTASSIUM 3.8 11/10/2020     Lab Results   Component Value Date    CHLORIDE 108 11/10/2020     Lab Results   Component Value Date    NADEEN 7.8 11/10/2020     Lab Results   Component Value Date    CO2 25 11/10/2020     Lab Results   Component Value Date    BUN 11 11/10/2020     Lab Results   Component Value Date    CR 0.76 11/10/2020     Lab Results   Component Value Date     11/10/2020     Juan Jones PA-C  [phone]

## 2020-11-14 NOTE — DISCHARGE SUMMARY
Discharge Summary    Adrian Petty MRN# [id number]   YOB: 1960 Age: 60 year old     Date of Admission:  11/5/2020  Admit Wgt: 101.4 kg  Date of Discharge:  11/14/2020  Discharge Wgt: 103.3 kg  Admitting Physician:  Jake Herrera MD  Discharge Physician:  KARTHIK MedleyC  Discharging Service:  Cardiovascular and Thoracic Surgery     Home clinic: [facility]  Primary Provider: Ghislaine Ref-Primary, Physician          Admission Diagnoses:   Coronary artery disease involving native coronary artery of native heart with unstable angina pectoris (H) [I25.110]          Discharge Diagnosis:   S/p CABG x 3  Hypertension  Hyperlipidemia  DM type II - newly diagnosed  NSTEMI             Discharge Disposition:   Discharged to home           Condition on Discharge:   Discharge condition: Good   Discharge vitals: Blood pressure (!) 148/83, pulse 99, temperature 98.8  F (37.1  C), temperature source Oral, resp. rate 16, weight 103.3 kg (227 lb 11.8 oz), SpO2 98 %.     Code status on discharge: Full Code           Procedures:   Coronary artery bypass grafting x 3 (LIMA to LAD, SVG to PDA, SVG to diag) on 11/9/20 by Dr Herrera          Medications Prior to Admission:     Medications Prior to Admission   Medication Sig Dispense Refill Last Dose     busPIRone (BUSPAR) 10 MG tablet Take 10 mg by mouth 3 times daily   11/5/2020 at 0600     losartan (COZAAR) 50 MG tablet Take 50 mg by mouth daily   11/5/2020 at 0600     rosuvastatin (CRESTOR) 40 MG tablet Take 1 tablet (40 mg) by mouth daily 90 tablet 3 11/5/2020 at 0600     sertraline (ZOLOFT) 50 MG tablet Take 50 mg by mouth daily   11/4/2020 at 2200     nitroGLYcerin (NITROSTAT) 0.3 MG sublingual tablet For chest pain place 1 tablet under the tongue every 5 minutes for 3 doses. If symptoms persist 5 minutes after 1st dose call 911. 30 tablet 3 Unknown at Unknown time     [DISCONTINUED] amLODIPine (NORVASC) 10 MG tablet Take 1 tablet (10 mg) by mouth daily 90  tablet 3 11/5/2020 at 0600     [DISCONTINUED] aspirin (ASA) 81 MG EC tablet Take 1 tablet (81 mg) by mouth daily 90 tablet 3 11/4/2020 at 0800     [DISCONTINUED] metoprolol tartrate (LOPRESSOR) 50 MG tablet Take 50 mg by mouth 2 times daily        [DISCONTINUED] potassium chloride ER (KLOR-CON M) 20 MEQ CR tablet Take 20 mEq by mouth daily    11/5/2020 at 0600     [DISCONTINUED] triamterene-HCTZ (DYAZIDE) 37.5-25 MG capsule Take by mouth every morning   11/4/2020 at 0800             Discharge Medications:     Current Discharge Medication List      START taking these medications    Details   acetaminophen (TYLENOL) 325 MG tablet Take 2 tablets (650 mg) by mouth every 4 hours as needed for other (multimodal surgical pain management along with NSAIDS and opioid medication as indicated based on pain control and physical function.)  Qty:      Associated Diagnoses: S/P CABG x 3      Alcohol Swabs PADS Use to swab the area of the injection or anamika as directed   Per insurance coverage  Qty: 100 each, Refills: 0    Associated Diagnoses: S/P CABG x 3      blood glucose (NO BRAND SPECIFIED) lancets standard To use to test glucose level in the blood  Use to test blood sugar  2  times daily as directed. To accompany glucose monitor brands per insurance coverage.  Qty: 100 each, Refills: 0    Associated Diagnoses: S/P CABG x 3      blood glucose (NO BRAND SPECIFIED) test strip To use to test glucose level in the blood  Use to test blood sugar  2 times daily as directed. To accompany glucose monitor brands per insurance coverage.  Qty: 100 each, Refills: 0    Associated Diagnoses: S/P CABG x 3      blood glucose calibration (NO BRAND SPECIFIED) solution Used to calibrate the blood glucose monitor as needed and as directed.  To accompany  blood glucose brands per insurance coverage  Qty: 1 Bottle, Refills: 0    Associated Diagnoses: S/P CABG x 3      blood glucose monitoring (NO BRAND SPECIFIED) meter device kit Use as directed    Per insurance coverage  Qty: 1 kit, Refills: 0    Associated Diagnoses: S/P CABG x 3      furosemide (LASIX) 40 MG tablet Take 1 tablet (40 mg) by mouth daily  Qty: 5 tablet, Refills: 0    Associated Diagnoses: S/P CABG x 3      metFORMIN (GLUCOPHAGE) 1000 MG tablet Take 1 tablet (1,000 mg) by mouth 2 times daily (with meals)  Qty: 60 tablet, Refills: 3    Associated Diagnoses: S/P CABG x 3      methocarbamol (ROBAXIN) 750 MG tablet Take 1 tablet (750 mg) by mouth 4 times daily as needed for muscle spasms  Qty: 40 tablet, Refills: 0    Associated Diagnoses: S/P CABG x 3      oxyCODONE (ROXICODONE) 5 MG tablet Take 1-2 tablets (5-10 mg) by mouth every 3 hours as needed for moderate to severe pain  Qty: 20 tablet, Refills: 0    Associated Diagnoses: S/P CABG x 3         CONTINUE these medications which have CHANGED    Details   amLODIPine (NORVASC) 5 MG tablet Take 1 tablet (5 mg) by mouth daily  Qty: 30 tablet, Refills: 3    Associated Diagnoses: S/P CABG x 3      aspirin (ASA) 325 MG EC tablet Take 1 tablet (325 mg) by mouth daily  Qty:      Associated Diagnoses: S/P CABG x 3      metoprolol tartrate (LOPRESSOR) 100 MG tablet Take 1 tablet (100 mg) by mouth 2 times daily  Qty: 60 tablet, Refills: 3    Associated Diagnoses: S/P CABG x 3      potassium chloride ER (KLOR-CON M) 10 MEQ CR tablet Take 2 tablets (20 mEq) by mouth daily  Qty: 10 tablet, Refills: 0    Associated Diagnoses: S/P CABG x 3         CONTINUE these medications which have NOT CHANGED    Details   busPIRone (BUSPAR) 10 MG tablet Take 10 mg by mouth 3 times daily      losartan (COZAAR) 50 MG tablet Take 50 mg by mouth daily      rosuvastatin (CRESTOR) 40 MG tablet Take 1 tablet (40 mg) by mouth daily  Qty: 90 tablet, Refills: 3    Associated Diagnoses: Coronary artery disease involving native coronary artery of native heart with unstable angina pectoris (H)      sertraline (ZOLOFT) 50 MG tablet Take 50 mg by mouth daily      nitroGLYcerin  (NITROSTAT) 0.3 MG sublingual tablet For chest pain place 1 tablet under the tongue every 5 minutes for 3 doses. If symptoms persist 5 minutes after 1st dose call 911.  Qty: 30 tablet, Refills: 3    Associated Diagnoses: Coronary artery disease involving native coronary artery of native heart with unstable angina pectoris (H); Mixed hyperlipidemia         STOP taking these medications       triamterene-HCTZ (DYAZIDE) 37.5-25 MG capsule Comments:   Reason for Stopping:                     Consultations:   Nutrition, Intensivist, Cardiology             Brief History of Illness:   Mr. Petty is a very pleasant 60-year-old gentleman who was admitted to the hospital with chest pain. He was diagnosed with non-ST elevation MI and a coronary angiogram demonstrated severe multivessel coronary artery disease including a chronically totally occluded PDA, significant disease involving the diagonal arteries and severe LAD disease. His circumflex coronary distribution was very small. He was started on nitroglycerin drip and Heparin drip. After discussing pt's symptoms and angiogram surgery was indicated. After discussing the risks/benefits of surgery pt decided to proceed with surgery.          Hospital Course:   Pt was taken to the OR on 11/9/20 by Dr Herrera for a CABG x 3. The findings of surgery included the patient had an overall normal LV systolic size and function. His posterior descending artery was on the small side, but it had minimal disease. The probe size was 1 mm. The patient did not have a large enough left circumflex coronary distribution for bypass grafting. A graft was placed on the medial branch of the first diagonal artery. This had mild disease. The probe size was 1.5 mm. The mid to distal LAD had mild disease and the probe size was 1.5 mm as well. There were no complications due to surgery. Prior to surgery being finished chest tubes and TPWs were left in place. Wounds were cleaned and dressings were  applied. Pt was transferred out of the OR hemodynamically stable.    While in the ICU pt's BP was maintained using inotropes and pressors. These were continuously weaned until they were no longer needed. Initially pt's glucose was maintained using an insulin gtt. This was changed to sliding scale insulin and at the time of discharge pt was going home on metformin 1000 mg PO bid. Pt is a newly diagnosed diabetic and will be checking his glucose twice a day. Pt was extubated on POD #0 at 1710. Pt was transferred out of the ICU on POD #1.     While on the step down unit pt continued to progress well. Pt's chest tubes and TPWs were removed when deemed necessary. Throughout pt's hospitalization pt's BP remained elevated. Adjustments were made to medications. Pt will be monitor and adjustments made as an outpatient. Pt will take BP daily and place readings in a notebook. Pt was noted to have some edema in his lower extremities and is being discharge with lasix and KCl for a short duration. The remainder of pt's hospitalization was unremarkable. Prior to discharge pt was ambulating per self, he had had a BM, and pain was controlled. Pt is being discharged to home with significant other. All questions/concerns were addressed prior to discharge. Pt/significant other are agreeable to plan of care. Continue to monitor.               Significant Results:   Lab Results   Component Value Date    WBC 9.3 11/12/2020     Lab Results   Component Value Date    RBC 4.28 11/12/2020     Lab Results   Component Value Date    HGB 12.4 11/12/2020     Lab Results   Component Value Date    HCT 38.2 11/12/2020     No components found for: MCT  Lab Results   Component Value Date    MCV 89 11/12/2020     Lab Results   Component Value Date    MCH 29.0 11/12/2020     Lab Results   Component Value Date    MCHC 32.5 11/12/2020     Lab Results   Component Value Date    RDW 14.2 11/12/2020     Lab Results   Component Value Date     11/12/2020        Last Basic Metabolic Panel:  Lab Results   Component Value Date     11/12/2020      Lab Results   Component Value Date    POTASSIUM 3.5 11/14/2020     Lab Results   Component Value Date    CHLORIDE 104 11/12/2020     Lab Results   Component Value Date    NADEEN 8.6 11/12/2020     Lab Results   Component Value Date    CO2 25 11/12/2020     Lab Results   Component Value Date    BUN 17 11/12/2020     Lab Results   Component Value Date    CR 0.86 11/12/2020     Lab Results   Component Value Date     11/12/2020                  Pending Results:   None           Discharge Instructions and Follow-Up:   Discharge diet: Regular   Discharge activity: Daily weights: Call if weight gain 2-3 lbs over 24 hours or if greater than 5 lbs in 1 week.  No lifting more than 10 lbs for 8-12 weeks.  No driving for 1 month.  Call for pain medication refill.  Call for temperature greater than 101.0  Daily shower with antibacterial soap.   Discharge follow-up: Follow up with cardiothoracic surgery on 11/30/20 at 2:00 pm.  Follow up with Dr Mendes (cardiology) on 1/14/20 at 12:45 pm.   Outpatient therapy: Cardiac rehab   Home Care agency: None    Supplies and equipment: None   Lines and drains: None    Wound care: Wash incision daily with antibacterial soap   Other instructions: None

## 2020-11-14 NOTE — PLAN OF CARE
A&Ox4. VSS on RA ex 's/90s at times. Hydralazine given x2. Tele: NSR. Sternal and LLE incisions WDL. LS diminished. IS 1000. +BS. +Flatus. +BM. CMS intact. Up independently in room. Denies pain. Regular diet. Potassium was replaced, recheck this AM.

## 2020-11-14 NOTE — PLAN OF CARE
Physical Therapy Discharge Summary    Reason for therapy discharge:    Discharged to home with outpatient therapy.    Progress towards therapy goal(s). See goals on Care Plan in [facility] electronic health record for goal details.  Goals partially met.  Barriers to achieving goals:   discharge from facility.    Therapy recommendation(s):    Continued therapy is recommended.  Rationale/Recommendations:  For further cardiac education and to increase endurance.

## 2020-11-14 NOTE — PROGRESS NOTES
Chart Check-non-billing note    Chart reviewed. Discussed with Juan CV surgery PA.  Plan for patient to discharge home today. BP meds are being adjusted by CV surgery.  Plan to increase Metformin to 1000mg BID at discharge, has tolerated 500mg BID in hospital last 2 days.  OK to discharge from hospitalist perspective.      Ida Be MD  Hospitalist

## 2020-11-14 NOTE — PLAN OF CARE
A&Ox4. AVSS, high BP, on RA.  Denies pain, on scheduled Tylenol and lido patch.  BP meds scheduled and PRN given.  LS clear/dim.  IS & Aerobika encouraged.  Sternal incision ALINE, CDI.  Former CT sites ALINE.  BS active, appetite good, passing gas, (+)BM.  Voiding w/o difficulty per urinal.  Up with SBA.

## 2020-11-15 DIAGNOSIS — Z95.1 S/P CABG (CORONARY ARTERY BYPASS GRAFT): Primary | ICD-10-CM

## 2020-11-15 RX ORDER — LOSARTAN POTASSIUM 50 MG/1
50 TABLET ORAL DAILY
Qty: 30 TABLET | Refills: 3 | Status: SHIPPED | OUTPATIENT
Start: 2020-11-15 | End: 2020-11-18

## 2020-11-16 ENCOUNTER — TELEPHONE (OUTPATIENT)
Dept: OTHER | Facility: CLINIC | Age: 60
End: 2020-11-16

## 2020-11-16 NOTE — TELEPHONE ENCOUNTER
48 hour post op call    ACTIVITY  How are you doing with activity? He is doing well, up walking in house with no problems  Are you continuing to use your IS 3-4 times a day and do you have any shortness of breath. Good, he is up to 3000 ml.   Are you weighing yourself daily and has it been stable?. Yes he has lost 4 lbs since being home.     PAIN  How is your pain, what are you doing for your pain? Good only taking a pain pill at bedtime.     Are you still doing sternal precautions? Yes    Do you hear any clicking when you are moving or taking a deep breath? No      INCISION: It looks good    ASSISTANCE  Do you have someone at home to help you with your daily activities and transportation needs?       MEDICATIONS  I would like to review your discharge medications and answer any questions you may have.   Wife, Yu     Are you on a blood thinner/Coumadin  No    Who is managing your Coumadin dosing/INR? NA       FOLLOW UP       Scheduled for cardiac rehab? 11/23   Scheduled to see our PA or Surgeon? 11/30   Scheduled to see your cardiologist ? Trista Rabago NP, 11/18 for hypertension management, BP > 160/95 consistently disite titration of antihypertensives, HR 84, Dr Mendes 1/14  Scheduled to see your primary care physician? Needs to fine one.   Do you have our contact information? Yes     STOPLIGHT TOOL      Were you happy with your care? Yes   Could we have done anything better? No

## 2020-11-18 ENCOUNTER — VIRTUAL VISIT (OUTPATIENT)
Dept: CARDIOLOGY | Facility: CLINIC | Age: 60
End: 2020-11-18
Payer: COMMERCIAL

## 2020-11-18 DIAGNOSIS — Z95.1 S/P CABG X 3: ICD-10-CM

## 2020-11-18 DIAGNOSIS — Z95.1 S/P CABG (CORONARY ARTERY BYPASS GRAFT): ICD-10-CM

## 2020-11-18 PROBLEM — E11.9 DIABETES MELLITUS, TYPE 2 (H): Status: ACTIVE | Noted: 2020-11-18

## 2020-11-18 PROCEDURE — 99214 OFFICE O/P EST MOD 30 MIN: CPT | Mod: 95 | Performed by: PHYSICIAN ASSISTANT

## 2020-11-18 RX ORDER — AMLODIPINE BESYLATE 10 MG/1
10 TABLET ORAL DAILY
Qty: 90 TABLET | Refills: 3 | Status: SHIPPED | OUTPATIENT
Start: 2020-11-18 | End: 2021-05-27

## 2020-11-18 RX ORDER — LOSARTAN POTASSIUM 100 MG/1
100 TABLET ORAL DAILY
Qty: 90 TABLET | Refills: 3 | Status: SHIPPED | OUTPATIENT
Start: 2020-11-18 | End: 2021-03-26

## 2020-11-18 NOTE — PATIENT INSTRUCTIONS
Thank you for coming into [facility] today.    We discussed: we'll find meds to get your blood pressure controlled.      Medication changes:  Increase losartan to 100 mg once a day today.    On Friday increase amlodipine to 10 mg once a day.   Continue other medications.      Follow up: you need labs and a follow up video visit with me in 2 weeks please.        Please call my nurse at [phone] with any questions or concerns.    Scheduling phone number: [phone]  Reminder: Please bring in all current medications, over the counter supplements and vitamin bottles to your next appointment.

## 2020-11-18 NOTE — PROGRESS NOTES
"Adrian Petty is a 60 year old male who is being evaluated via a billable video visit.      The patient has been notified of following:     \"This video visit will be conducted via a call between you and your physician/provider. We have found that certain health care needs can be provided without the need for an in-person physical exam.  This service lets us provide the care you need with a video conversation.  If a prescription is necessary we can send it directly to your pharmacy.  If lab work is needed we can place an order for that and you can then stop by our lab to have the test done at a later time.    Video visits are billed at different rates depending on your insurance coverage.  Please reach out to your insurance provider with any questions.    If during the course of the call the physician/provider feels a video visit is not appropriate, you will not be charged for this service.\"    Patient has given verbal consent for Video visit? Yes  How would you like to obtain your AVS? MyChart  If you are dropped from the video visit, the video invite should be resent to: Text to cell phone: [phone]  Will anyone else be joining your video visit? No        Video-Visit Details    Type of service:  Video Visit    Video Start Time: 138  Video End Time: 158    Originating Location (pt. Location): Home    Distant Location (provider location):  [facility] HEART [facility] VIV     Platform used for Video Visit: Brandyn Perales PA-C    Review Of Systems  Skin: NEGATIVE  Eyes:Ears/Nose/Throat: NEGATIVE  Respiratory: sob on exertion  Cardiovascular:NEGATIVE  Gastrointestinal: Diarrhea  Genitourinary:NEGATIVE   Musculoskeletal: NEGATIVE  Neurologic: NEGATIVE  Psychiatric: NEGATIVE  Hematologic/Lymphatic/Immunologic: NEGATIVE  Endocrine:  Diabetes  Vital signs reported by patient  BP.144/87  P. 80  Wt.218lb  Ht.5'10\"  Denise Mehta,Sonido      [id number]  HPI and Plan:   See dictation    Orders this " Visit:  Orders Placed This Encounter   Procedures     Basic metabolic panel     Follow-Up with Cardiac Advanced Practice Provider     Orders Placed This Encounter   Medications     losartan (COZAAR) 100 MG tablet     Sig: Take 1 tablet (100 mg) by mouth daily     Dispense:  90 tablet     Refill:  3     amLODIPine (NORVASC) 10 MG tablet     Sig: Take 1 tablet (10 mg) by mouth daily     Dispense:  90 tablet     Refill:  3     Medications Discontinued During This Encounter   Medication Reason     losartan (COZAAR) 50 MG tablet      amLODIPine (NORVASC) 5 MG tablet          Encounter Diagnoses   Name Primary?     S/P CABG (coronary artery bypass graft)      S/P CABG x 3        CURRENT MEDICATIONS:  Current Outpatient Medications   Medication Sig Dispense Refill     acetaminophen (TYLENOL) 325 MG tablet Take 2 tablets (650 mg) by mouth every 4 hours as needed for other (multimodal surgical pain management along with NSAIDS and opioid medication as indicated based on pain control and physical function.)       Alcohol Swabs PADS Use to swab the area of the injection or anamika as directed   Per insurance coverage 100 each 0     amLODIPine (NORVASC) 10 MG tablet Take 1 tablet (10 mg) by mouth daily 90 tablet 3     aspirin (ASA) 325 MG EC tablet Take 1 tablet (325 mg) by mouth daily       blood glucose (NO BRAND SPECIFIED) lancets standard To use to test glucose level in the blood  Use to test blood sugar  2  times daily as directed. To accompany glucose monitor brands per insurance coverage. 100 each 0     blood glucose (NO BRAND SPECIFIED) test strip To use to test glucose level in the blood  Use to test blood sugar  2 times daily as directed. To accompany glucose monitor brands per insurance coverage. 100 each 0     blood glucose calibration (NO BRAND SPECIFIED) solution Used to calibrate the blood glucose monitor as needed and as directed.  To accompany  blood glucose brands per insurance coverage 1 Bottle 0     blood  glucose monitoring (NO BRAND SPECIFIED) meter device kit Use as directed   Per insurance coverage 1 kit 0     busPIRone (BUSPAR) 10 MG tablet Take 10 mg by mouth 3 times daily       furosemide (LASIX) 40 MG tablet Take 1 tablet (40 mg) by mouth daily 5 tablet 0     losartan (COZAAR) 100 MG tablet Take 1 tablet (100 mg) by mouth daily 90 tablet 3     metFORMIN (GLUCOPHAGE) 1000 MG tablet Take 1 tablet (1,000 mg) by mouth 2 times daily (with meals) 60 tablet 3     methocarbamol (ROBAXIN) 750 MG tablet Take 1 tablet (750 mg) by mouth 4 times daily as needed for muscle spasms 40 tablet 0     metoprolol tartrate (LOPRESSOR) 100 MG tablet Take 1 tablet (100 mg) by mouth 2 times daily 60 tablet 3     oxyCODONE (ROXICODONE) 5 MG tablet Take 1-2 tablets (5-10 mg) by mouth every 3 hours as needed for moderate to severe pain 20 tablet 0     potassium chloride ER (KLOR-CON M) 10 MEQ CR tablet Take 2 tablets (20 mEq) by mouth daily 10 tablet 0     rosuvastatin (CRESTOR) 40 MG tablet Take 1 tablet (40 mg) by mouth daily 90 tablet 3     sertraline (ZOLOFT) 50 MG tablet Take 50 mg by mouth daily       nitroGLYcerin (NITROSTAT) 0.3 MG sublingual tablet For chest pain place 1 tablet under the tongue every 5 minutes for 3 doses. If symptoms persist 5 minutes after 1st dose call 911. (Patient not taking: Reported on 11/18/2020) 30 tablet 3       ALLERGIES   No Known Allergies    PAST MEDICAL HISTORY:  Past Medical History:   Diagnosis Date     Arthritis      CAD (coronary artery disease) 11/05/2020    3 vessel     Cancer (H) 2010    prostate     DM2 (diabetes mellitus, type 2) (H)     A1C was 8.0 on 11/4/20     Heart disease 2012    stents     HLD (hyperlipidemia)      Hypertension      NSTEMI (non-ST elevated myocardial infarction) (H) 11/05/2020     S/P CABG x 3 11/09/2020    LIMA to LAD, SVG to PDA, SVG to diag       PAST SURGICAL HISTORY:  Past Surgical History:   Procedure Laterality Date     BACK SURGERY      decompression      BYPASS GRAFT ARTERY CORONARY N/A 11/9/2020    Procedure: CORONARY ARTERY BYPASS GRAFT X 3 (LIMA-LAD, SV-DIAG, SV-PDA), ON PUMP WITH SHAYY READ BY ANESTHESIOLOGIST DR RANGEL.;  Surgeon: Leonardo Herrera MD;  Location:  OR     COLONOSCOPY       CV HEART CATHETERIZATION WITH POSSIBLE INTERVENTION N/A 11/5/2020    Procedure: Heart Catheterization with Possible Intervention with KNAPPER - Schedule at 10:30 AM on Thursday 11/5;  Surgeon: Ted Haider MD;  Location:  HEART CARDIAC CATH LAB     DAVINCI PROSTATECTOMY       GENITOURINARY SURGERY      penile implant     ORTHOPEDIC SURGERY         FAMILY HISTORY:  Family History   Problem Relation Age of Onset     Coronary Artery Disease Father        SOCIAL HISTORY:  Social History     Socioeconomic History     Marital status:      Spouse name: None     Number of children: None     Years of education: None     Highest education level: None   Occupational History     None   Social Needs     Financial resource strain: None     Food insecurity     Worry: None     Inability: None     Transportation needs     Medical: None     Non-medical: None   Tobacco Use     Smoking status: Never Smoker     Smokeless tobacco: Former User   Substance and Sexual Activity     Alcohol use: Yes     Comment: 1 beer per week     Drug use: None     Sexual activity: None   Lifestyle     Physical activity     Days per week: None     Minutes per session: None     Stress: None   Relationships     Social connections     Talks on phone: None     Gets together: None     Attends [employer] service: None     Active member of club or organization: None     Attends meetings of clubs or organizations: None     Relationship status: None     Intimate partner violence     Fear of current or ex partner: None     Emotionally abused: None     Physically abused: None     Forced sexual activity: None   Other Topics Concern     Parent/sibling w/ CABG, MI or angioplasty before 65F 55M? Not  Asked   Social History Narrative     None       Review of Systems:  Skin:        Eyes:       ENT:       Respiratory:       Cardiovascular:       Gastroenterology:      Genitourinary:       Musculoskeletal:       Neurologic:       Psychiatric:       Heme/Lymph/Imm:       Endocrine:         Physical Exam:  Vitals: There were no vitals taken for this visit.   Please refer to dictation for physical exam    Recent Lab Results:  LIPID RESULTS:  Lab Results   Component Value Date    CHOL 239 (H) 11/04/2020    HDL 36 (L) 11/04/2020    LDL  11/04/2020     Cannot estimate LDL when triglyceride exceeds 400 mg/dL    TRIG 459 (H) 11/04/2020       LIVER ENZYME RESULTS:  Lab Results   Component Value Date    AST 36 11/10/2020    ALT 35 11/10/2020       CBC RESULTS:  Lab Results   Component Value Date    WBC 9.3 11/12/2020    RBC 4.28 (L) 11/12/2020    HGB 12.4 (L) 11/12/2020    HCT 38.2 (L) 11/12/2020    MCV 89 11/12/2020    MCH 29.0 11/12/2020    MCHC 32.5 11/12/2020    RDW 14.2 11/12/2020     11/12/2020       BMP RESULTS:  Lab Results   Component Value Date     11/12/2020    POTASSIUM 3.5 11/14/2020    CHLORIDE 104 11/12/2020    CO2 25 11/12/2020    ANIONGAP 5 11/12/2020     (H) 11/12/2020    BUN 17 11/12/2020    CR 0.86 11/12/2020    GFRESTIMATED >90 11/12/2020    GFRESTBLACK >90 11/12/2020    NADEEN 8.6 11/12/2020        A1C RESULTS:  Lab Results   Component Value Date    A1C 7.7 (H) 11/08/2020       INR RESULTS:  Lab Results   Component Value Date    INR 1.19 (H) 11/09/2020    INR 1.42 (H) 11/09/2020           CC  No referring provider defined for this encounter.

## 2020-11-18 NOTE — LETTER
"11/18/2020    Physician No Ref-Primary  No address on file    RE: Adrian Petty       Dear Colleague,    I had the pleasure of seeing Adrian Petty in the [facility].    Adrian Petty is a 60 year old male who is being evaluated via a billable video visit.      The patient has been notified of following:     \"This video visit will be conducted via a call between you and your physician/provider. We have found that certain health care needs can be provided without the need for an in-person physical exam.  This service lets us provide the care you need with a video conversation.  If a prescription is necessary we can send it directly to your pharmacy.  If lab work is needed we can place an order for that and you can then stop by our lab to have the test done at a later time.    Video visits are billed at different rates depending on your insurance coverage.  Please reach out to your insurance provider with any questions.    If during the course of the call the physician/provider feels a video visit is not appropriate, you will not be charged for this service.\"    Patient has given verbal consent for Video visit? Yes  How would you like to obtain your AVS? MyChart  If you are dropped from the video visit, the video invite should be resent to: Text to cell phone: [phone]  Will anyone else be joining your video visit? No        Video-Visit Details    Type of service:  Video Visit    Video Start Time: 138  Video End Time: 158    Originating Location (pt. Location): Home    Distant Location (provider location):  [facility] VIV     Platform used for Video Visit: Doxshlomo Perales PA-C    Review Of Systems  Skin: NEGATIVE  Eyes:Ears/Nose/Throat: NEGATIVE  Respiratory: sob on exertion  Cardiovascular:NEGATIVE  Gastrointestinal: Diarrhea  Genitourinary:NEGATIVE   Musculoskeletal: NEGATIVE  Neurologic: NEGATIVE  Psychiatric: " "NEGATIVE  Hematologic/Lymphatic/Immunologic: NEGATIVE  Endocrine:  Diabetes  Vital signs reported by patient  BP.144/87  P. 80  Wt.218lb  Ht.5'10\"  Denise Mehta,Sonido      [id number]  HPI and Plan:   See dictation    Orders this Visit:  Orders Placed This Encounter   Procedures     Basic metabolic panel     Follow-Up with Cardiac Advanced Practice Provider     Orders Placed This Encounter   Medications     losartan (COZAAR) 100 MG tablet     Sig: Take 1 tablet (100 mg) by mouth daily     Dispense:  90 tablet     Refill:  3     amLODIPine (NORVASC) 10 MG tablet     Sig: Take 1 tablet (10 mg) by mouth daily     Dispense:  90 tablet     Refill:  3     Medications Discontinued During This Encounter   Medication Reason     losartan (COZAAR) 50 MG tablet      amLODIPine (NORVASC) 5 MG tablet          Encounter Diagnoses   Name Primary?     S/P CABG (coronary artery bypass graft)      S/P CABG x 3        CURRENT MEDICATIONS:  Current Outpatient Medications   Medication Sig Dispense Refill     acetaminophen (TYLENOL) 325 MG tablet Take 2 tablets (650 mg) by mouth every 4 hours as needed for other (multimodal surgical pain management along with NSAIDS and opioid medication as indicated based on pain control and physical function.)       Alcohol Swabs PADS Use to swab the area of the injection or anamika as directed   Per insurance coverage 100 each 0     amLODIPine (NORVASC) 10 MG tablet Take 1 tablet (10 mg) by mouth daily 90 tablet 3     aspirin (ASA) 325 MG EC tablet Take 1 tablet (325 mg) by mouth daily       blood glucose (NO BRAND SPECIFIED) lancets standard To use to test glucose level in the blood  Use to test blood sugar  2  times daily as directed. To accompany glucose monitor brands per insurance coverage. 100 each 0     blood glucose (NO BRAND SPECIFIED) test strip To use to test glucose level in the blood  Use to test blood sugar  2 times daily as directed. To accompany glucose monitor brands per insurance " coverage. 100 each 0     blood glucose calibration (NO BRAND SPECIFIED) solution Used to calibrate the blood glucose monitor as needed and as directed.  To accompany  blood glucose brands per insurance coverage 1 Bottle 0     blood glucose monitoring (NO BRAND SPECIFIED) meter device kit Use as directed   Per insurance coverage 1 kit 0     busPIRone (BUSPAR) 10 MG tablet Take 10 mg by mouth 3 times daily       furosemide (LASIX) 40 MG tablet Take 1 tablet (40 mg) by mouth daily 5 tablet 0     losartan (COZAAR) 100 MG tablet Take 1 tablet (100 mg) by mouth daily 90 tablet 3     metFORMIN (GLUCOPHAGE) 1000 MG tablet Take 1 tablet (1,000 mg) by mouth 2 times daily (with meals) 60 tablet 3     methocarbamol (ROBAXIN) 750 MG tablet Take 1 tablet (750 mg) by mouth 4 times daily as needed for muscle spasms 40 tablet 0     metoprolol tartrate (LOPRESSOR) 100 MG tablet Take 1 tablet (100 mg) by mouth 2 times daily 60 tablet 3     oxyCODONE (ROXICODONE) 5 MG tablet Take 1-2 tablets (5-10 mg) by mouth every 3 hours as needed for moderate to severe pain 20 tablet 0     potassium chloride ER (KLOR-CON M) 10 MEQ CR tablet Take 2 tablets (20 mEq) by mouth daily 10 tablet 0     rosuvastatin (CRESTOR) 40 MG tablet Take 1 tablet (40 mg) by mouth daily 90 tablet 3     sertraline (ZOLOFT) 50 MG tablet Take 50 mg by mouth daily       nitroGLYcerin (NITROSTAT) 0.3 MG sublingual tablet For chest pain place 1 tablet under the tongue every 5 minutes for 3 doses. If symptoms persist 5 minutes after 1st dose call 911. (Patient not taking: Reported on 11/18/2020) 30 tablet 3       ALLERGIES   No Known Allergies    PAST MEDICAL HISTORY:  Past Medical History:   Diagnosis Date     Arthritis      CAD (coronary artery disease) 11/05/2020    3 vessel     Cancer (H) 2010    prostate     DM2 (diabetes mellitus, type 2) (H)     A1C was 8.0 on 11/4/20     Heart disease 2012    stents     HLD (hyperlipidemia)      Hypertension      NSTEMI (non-ST  elevated myocardial infarction) (H) 11/05/2020     S/P CABG x 3 11/09/2020    LIMA to LAD, SVG to PDA, SVG to diag       PAST SURGICAL HISTORY:  Past Surgical History:   Procedure Laterality Date     BACK SURGERY      decompression     BYPASS GRAFT ARTERY CORONARY N/A 11/9/2020    Procedure: CORONARY ARTERY BYPASS GRAFT X 3 (LIMA-LAD, SV-DIAG, SV-PDA), ON PUMP WITH SHAYY READ BY ANESTHESIOLOGIST DR RANGEL.;  Surgeon: Leonardo Herrera MD;  Location:  OR     COLONOSCOPY       CV HEART CATHETERIZATION WITH POSSIBLE INTERVENTION N/A 11/5/2020    Procedure: Heart Catheterization with Possible Intervention with KNAPPER - Schedule at 10:30 AM on Thursday 11/5;  Surgeon: Ted Haider MD;  Location:  HEART CARDIAC CATH LAB     DAVINCI PROSTATECTOMY       GENITOURINARY SURGERY      penile implant     ORTHOPEDIC SURGERY         FAMILY HISTORY:  Family History   Problem Relation Age of Onset     Coronary Artery Disease Father        SOCIAL HISTORY:  Social History     Socioeconomic History     Marital status:      Spouse name: None     Number of children: None     Years of education: None     Highest education level: None   Occupational History     None   Social Needs     Financial resource strain: None     Food insecurity     Worry: None     Inability: None     Transportation needs     Medical: None     Non-medical: None   Tobacco Use     Smoking status: Never Smoker     Smokeless tobacco: Former User   Substance and Sexual Activity     Alcohol use: Yes     Comment: 1 beer per week     Drug use: None     Sexual activity: None   Lifestyle     Physical activity     Days per week: None     Minutes per session: None     Stress: None   Relationships     Social connections     Talks on phone: None     Gets together: None     Attends Catholic service: None     Active member of club or organization: None     Attends meetings of clubs or organizations: None     Relationship status: None     Intimate  partner violence     Fear of current or ex partner: None     Emotionally abused: None     Physically abused: None     Forced sexual activity: None   Other Topics Concern     Parent/sibling w/ CABG, MI or angioplasty before 65F 55M? Not Asked   Social History Narrative     None       Review of Systems:  Skin:        Eyes:       ENT:       Respiratory:       Cardiovascular:       Gastroenterology:      Genitourinary:       Musculoskeletal:       Neurologic:       Psychiatric:       Heme/Lymph/Imm:       Endocrine:         Physical Exam:  Vitals: There were no vitals taken for this visit.   Please refer to dictation for physical exam    Recent Lab Results:  LIPID RESULTS:  Lab Results   Component Value Date    CHOL 239 (H) 11/04/2020    HDL 36 (L) 11/04/2020    LDL  11/04/2020     Cannot estimate LDL when triglyceride exceeds 400 mg/dL    TRIG 459 (H) 11/04/2020       LIVER ENZYME RESULTS:  Lab Results   Component Value Date    AST 36 11/10/2020    ALT 35 11/10/2020       CBC RESULTS:  Lab Results   Component Value Date    WBC 9.3 11/12/2020    RBC 4.28 (L) 11/12/2020    HGB 12.4 (L) 11/12/2020    HCT 38.2 (L) 11/12/2020    MCV 89 11/12/2020    MCH 29.0 11/12/2020    MCHC 32.5 11/12/2020    RDW 14.2 11/12/2020     11/12/2020       BMP RESULTS:  Lab Results   Component Value Date     11/12/2020    POTASSIUM 3.5 11/14/2020    CHLORIDE 104 11/12/2020    CO2 25 11/12/2020    ANIONGAP 5 11/12/2020     (H) 11/12/2020    BUN 17 11/12/2020    CR 0.86 11/12/2020    GFRESTIMATED >90 11/12/2020    GFRESTBLACK >90 11/12/2020    NADEEN 8.6 11/12/2020        A1C RESULTS:  Lab Results   Component Value Date    A1C 7.7 (H) 11/08/2020       INR RESULTS:  Lab Results   Component Value Date    INR 1.19 (H) 11/09/2020    INR 1.42 (H) 11/09/2020           CC  No referring provider defined for this encounter.            Thank you for allowing me to participate in the care of your patient.      Sincerely,     Denise Perales,  PAJOANIE     [facility]    cc:   No referring provider defined for this encounter.

## 2020-11-23 ENCOUNTER — HOSPITAL ENCOUNTER (OUTPATIENT)
Dept: CARDIAC REHAB | Facility: CLINIC | Age: 60
End: 2020-11-23
Attending: PHYSICIAN ASSISTANT
Payer: COMMERCIAL

## 2020-11-23 DIAGNOSIS — Z95.1 S/P CABG X 3: ICD-10-CM

## 2020-11-23 PROCEDURE — 999N000109 HC STATISTIC OP CR VISIT: Performed by: REHABILITATION PRACTITIONER

## 2020-11-23 PROCEDURE — 93798 PHYS/QHP OP CAR RHAB W/ECG: CPT | Performed by: REHABILITATION PRACTITIONER

## 2020-11-23 PROCEDURE — 999N000108 HC STATISTIC OP CARDIAC VISIT #2: Performed by: REHABILITATION PRACTITIONER

## 2020-11-23 PROCEDURE — 93797 PHYS/QHP OP CAR RHAB WO ECG: CPT | Mod: 59 | Performed by: REHABILITATION PRACTITIONER

## 2020-11-24 ENCOUNTER — OFFICE VISIT (OUTPATIENT)
Dept: FAMILY MEDICINE | Facility: CLINIC | Age: 60
End: 2020-11-24
Payer: COMMERCIAL

## 2020-11-24 ENCOUNTER — MYC MEDICAL ADVICE (OUTPATIENT)
Dept: FAMILY MEDICINE | Facility: CLINIC | Age: 60
End: 2020-11-24

## 2020-11-24 VITALS
BODY MASS INDEX: 31.21 KG/M2 | DIASTOLIC BLOOD PRESSURE: 72 MMHG | TEMPERATURE: 96.8 F | OXYGEN SATURATION: 98 % | WEIGHT: 218 LBS | SYSTOLIC BLOOD PRESSURE: 124 MMHG | HEART RATE: 73 BPM | HEIGHT: 70 IN

## 2020-11-24 DIAGNOSIS — Z95.1 S/P CABG X 3: ICD-10-CM

## 2020-11-24 DIAGNOSIS — Z11.59 NEED FOR HEPATITIS C SCREENING TEST: ICD-10-CM

## 2020-11-24 DIAGNOSIS — Z12.11 COLON CANCER SCREENING: ICD-10-CM

## 2020-11-24 DIAGNOSIS — Z23 NEED FOR PROPHYLACTIC VACCINATION AND INOCULATION AGAINST INFLUENZA: ICD-10-CM

## 2020-11-24 DIAGNOSIS — E11.69 TYPE 2 DIABETES MELLITUS WITH OTHER SPECIFIED COMPLICATION, WITHOUT LONG-TERM CURRENT USE OF INSULIN (H): ICD-10-CM

## 2020-11-24 DIAGNOSIS — R19.7 DIARRHEA, UNSPECIFIED TYPE: Primary | ICD-10-CM

## 2020-11-24 DIAGNOSIS — Z11.4 SCREENING FOR HIV (HUMAN IMMUNODEFICIENCY VIRUS): ICD-10-CM

## 2020-11-24 PROCEDURE — 36415 COLL VENOUS BLD VENIPUNCTURE: CPT | Performed by: INTERNAL MEDICINE

## 2020-11-24 PROCEDURE — 80048 BASIC METABOLIC PNL TOTAL CA: CPT | Performed by: INTERNAL MEDICINE

## 2020-11-24 PROCEDURE — 90682 RIV4 VACC RECOMBINANT DNA IM: CPT | Performed by: INTERNAL MEDICINE

## 2020-11-24 PROCEDURE — 82043 UR ALBUMIN QUANTITATIVE: CPT | Performed by: INTERNAL MEDICINE

## 2020-11-24 PROCEDURE — 99204 OFFICE O/P NEW MOD 45 MIN: CPT | Mod: 25 | Performed by: INTERNAL MEDICINE

## 2020-11-24 PROCEDURE — 90471 IMMUNIZATION ADMIN: CPT | Performed by: INTERNAL MEDICINE

## 2020-11-24 PROCEDURE — 83735 ASSAY OF MAGNESIUM: CPT | Performed by: INTERNAL MEDICINE

## 2020-11-24 ASSESSMENT — ANXIETY QUESTIONNAIRES
GAD7 TOTAL SCORE: 11
7. FEELING AFRAID AS IF SOMETHING AWFUL MIGHT HAPPEN: SEVERAL DAYS
IF YOU CHECKED OFF ANY PROBLEMS ON THIS QUESTIONNAIRE, HOW DIFFICULT HAVE THESE PROBLEMS MADE IT FOR YOU TO DO YOUR WORK, TAKE CARE OF THINGS AT HOME, OR GET ALONG WITH OTHER PEOPLE: SOMEWHAT DIFFICULT
2. NOT BEING ABLE TO STOP OR CONTROL WORRYING: SEVERAL DAYS
5. BEING SO RESTLESS THAT IT IS HARD TO SIT STILL: MORE THAN HALF THE DAYS
1. FEELING NERVOUS, ANXIOUS, OR ON EDGE: NEARLY EVERY DAY
3. WORRYING TOO MUCH ABOUT DIFFERENT THINGS: SEVERAL DAYS
6. BECOMING EASILY ANNOYED OR IRRITABLE: MORE THAN HALF THE DAYS

## 2020-11-24 ASSESSMENT — PATIENT HEALTH QUESTIONNAIRE - PHQ9: 5. POOR APPETITE OR OVEREATING: SEVERAL DAYS

## 2020-11-24 ASSESSMENT — MIFFLIN-ST. JEOR: SCORE: 1795.09

## 2020-11-24 NOTE — PROGRESS NOTES
"Subjective     Adrian Petty is a 60 year old male who presents to clinic today for the following health issues:    HPI         New Patient/Transfer of Care    Hospital Follow-up Visit:    Hospital/Nursing Home/IP Rehab Facility: [facility]  Date of Admission: 11/05/2020  Date of Discharge: 11/14/2020  Reason(s) for Admission: Coronary artery disease involving native coronary artery of native heart with unstable angina pectoris (H) [I25.110]      Was your hospitalization related to COVID-19? No   Problems taking medications regularly:  None  Medication changes since discharge: None  Problems adhering to non-medication therapy:  None    Summary of hospitalization:  [facility] discharge summary reviewed  Diagnostic Tests/Treatments reviewed.  Follow up needed: YES  Other Healthcare Providers Involved in Patient s Care:         Specialist appointment - CARDIOLOGY  Update since discharge: improved.   Post Discharge Medication Reconciliation: discharge medications reconciled, continue medications without change.  Plan of care communicated with patient          patient presenting for hospital follow up  Has no particular concerns , started cardiac rehab,   No CP, SOB, no palpitations, no dizziness, no presyncope or syncope.   No GI or  symptoms  Was diagnosed with Diabetes recently and started on metformin, developed diarrhea for 10 days, today diarrhea has stopped. No blood in stools, no abdominal pain, no N or V. No fever or chills.       Review of Systems   Constitutional, HEENT, cardiovascular, pulmonary, GI, , musculoskeletal, neuro, skin, endocrine and psych systems are negative, except as otherwise noted.      Objective    /72 (BP Location: Left arm, Patient Position: Chair, Cuff Size: Adult Large)   Pulse 73   Temp 96.8  F (36  C) (Temporal)   Ht 1.778 m (5' 10\")   Wt 98.9 kg (218 lb)   SpO2 98%   BMI 31.28 kg/m    Body mass index is 31.28 kg/m .  Physical Exam "   GENERAL: healthy, alert and no distress  EYES: Eyes grossly normal to inspection, PERRL and conjunctivae and sclerae normal  NECK: no adenopathy, no asymmetry, masses, or scars and thyroid normal to palpation. No bruits.  CHEST scar healing, no erythema or purulence.  RESP: lungs clear to auscultation - no rales, rhonchi or wheezes  CV: regular rate and rhythm, normal S1 S2, no S3 or S4, no murmur, click or rub, no peripheral edema and peripheral pulses strong  ABDOMEN: soft, nontender, no hepatosplenomegaly, no masses and bowel sounds normal  MS: no gross musculoskeletal defects noted, no edema  SKIN: no suspicious lesions or rashes, bruises on upper abdomen.  NEURO: Normal strength and tone, mentation intact and speech normal  PSYCH: mentation appears normal, affect normal/bright  LYMPH: no cervical, supraclavicular, axillary, or inguinal adenopathy  Diabetic foot exam, intact sensation, normal monofilament test, pulses PT/DP +2 , no caluses or foot deformities,     No results found for this or any previous visit (from the past 24 hour(s)).        Assessment & Plan   Problem List Items Addressed This Visit        Endocrine    Diabetes mellitus, type 2 (H)    Relevant Orders    AMBULATORY ADULT DIABETES EDUCATOR REFERRAL    Albumin Random Urine Quantitative with Creat Ratio (Completed)      Other Visit Diagnoses     Diarrhea, unspecified type    -  Primary    Relevant Orders    Basic metabolic panel  (Ca, Cl, CO2, Creat, Gluc, K, Na, BUN) (Completed)    Magnesium (Completed)    Colon cancer screening        Relevant Orders    Fecal colorectal cancer screen (FIT)    Need for prophylactic vaccination and inoculation against influenza        Relevant Orders    INFLUENZA QUAD, RECOMBINANT, P-FREE (RIV4) (FLUBLOCK) [79084] (Completed)    Screening for HIV (human immunodeficiency virus)        Need for hepatitis C screening test             Repeat labs in 2 months  Stay on current medications; discussed side effects  "including B Blocker, no symptoms from medications  schedule follow up in 2 months; follow up with cardiology,   BP seems well controlled, vitals stable.  Need for diabetic eye exam.       BMI:   Estimated body mass index is 31.28 kg/m  as calculated from the following:    Height as of this encounter: 1.778 m (5' 10\").    Weight as of this encounter: 98.9 kg (218 lb).   Weight management plan: Discussed healthy diet and exercise guidelines           MEDICATIONS:  Continue current medications without change  CONSULTATION/REFERRAL to Cardiology  Work on weight loss  Regular exercise  See Patient Instructions  Return in about 2 months (around 1/24/2021), or if symptoms worsen or fail to improve.    Emma Barlow MD  [facility]    "

## 2020-11-25 LAB
ANION GAP SERPL CALCULATED.3IONS-SCNC: 6 MMOL/L (ref 3–14)
BUN SERPL-MCNC: 14 MG/DL (ref 7–30)
CALCIUM SERPL-MCNC: 9.2 MG/DL (ref 8.5–10.1)
CHLORIDE SERPL-SCNC: 105 MMOL/L (ref 94–109)
CO2 SERPL-SCNC: 27 MMOL/L (ref 20–32)
CREAT SERPL-MCNC: 1 MG/DL (ref 0.66–1.25)
CREAT UR-MCNC: 357 MG/DL
GFR SERPL CREATININE-BSD FRML MDRD: 80 ML/MIN/{1.73_M2}
GLUCOSE SERPL-MCNC: 151 MG/DL (ref 70–99)
MAGNESIUM SERPL-MCNC: 2.1 MG/DL (ref 1.6–2.3)
MICROALBUMIN UR-MCNC: 51 MG/L
MICROALBUMIN/CREAT UR: 14.34 MG/G CR (ref 0–17)
POTASSIUM SERPL-SCNC: 4 MMOL/L (ref 3.4–5.3)
SODIUM SERPL-SCNC: 138 MMOL/L (ref 133–144)

## 2020-11-25 ASSESSMENT — ANXIETY QUESTIONNAIRES: GAD7 TOTAL SCORE: 11

## 2020-11-30 ENCOUNTER — OFFICE VISIT (OUTPATIENT)
Dept: CARDIOLOGY | Facility: CLINIC | Age: 60
End: 2020-11-30
Payer: COMMERCIAL

## 2020-11-30 DIAGNOSIS — Z95.1 S/P CABG X 3: Primary | ICD-10-CM

## 2020-11-30 PROCEDURE — 99024 POSTOP FOLLOW-UP VISIT: CPT | Performed by: PHYSICIAN ASSISTANT

## 2020-11-30 NOTE — PROGRESS NOTES
Pt comes to the clinic for routine f/u of s/p CABG x 3 by Dr Herrera on 11/9/20. He was initially admitted to the hospital on 11/5 with NSTEMI and discharged to home on 11/14. His hospitalization was complicated by increased BP and newly diagnosed DM.     He comes to the clinic stating that his pain is being controlled. He was using robaxin at night but has not used any pain meds recently. Breathing is good. Continues to work with his IS. Appetite is decreased but denies any nausea. BMs are back to normal. Denies any popping/clicking in his breast bone. Denies any F/C/NS. He is worried about doing his cardiac rehab at the hospital with Covid. He will reach out to them to get educational materials. He is sleeping well in his bed. Denies any swelling in his lower extremities. Has been checking his BP at home and it has been running higher than he is getting at his f/u appts. After watching him set it up his BP cuff it is a little lower. Re educated on position and reading was in the 130s. Monitoring his glucose at home and most of his readings are in the 130s. Working with provider to better control his glucose. Denies any lightheadedness or dizziness. Pt continues to take his aspirin 325 mg, crestor 40 mg, amlodipine 10 mg, losartan 100 mg, and lopressor 100 mg bid.     Up in chair, comfortable, -O2  /81 P 72  CV - reg rate, -edema LE  Pulm - CTA  Derm - sternal incision D/I    A/P:  Pt is progressing well. All surgical restrictions reviewed. All questions/concerns addressed. Meds reviewed. No need for further f/u with surgery unless questions/concerns arise. Pt is to continue to work with is PCP and cardiology. Pt is agreeable to plan of care. Continue to monitor.

## 2020-11-30 NOTE — PATIENT INSTRUCTIONS
No lifting over 10 lbs x 8 weeks after surgery. After the 8 weeks gradually increase what you are lifting.  No further surgical restrictions 12 weeks after surgery.  No driving for 4 weeks after surgery.  Participate in cardiac rehab phase II.  Call [phone] with any questions/concerns for your surgeon.  Continue to work with your incentive spirometer for about 1-2 more weeks.

## 2020-11-30 NOTE — PROGRESS NOTES
B/P: 136/81 right arm Large cuff   Weight: 221.1 lbs  Height: 5 ft 10 in  Pulse: 72   Oxygen: 100%

## 2020-12-01 DIAGNOSIS — Z95.1 S/P CABG X 3: ICD-10-CM

## 2020-12-01 LAB
ANION GAP SERPL CALCULATED.3IONS-SCNC: 8 MMOL/L (ref 3–14)
BUN SERPL-MCNC: 13 MG/DL (ref 7–30)
CALCIUM SERPL-MCNC: 9.1 MG/DL (ref 8.5–10.1)
CHLORIDE SERPL-SCNC: 107 MMOL/L (ref 94–109)
CO2 SERPL-SCNC: 25 MMOL/L (ref 20–32)
CREAT SERPL-MCNC: 0.96 MG/DL (ref 0.66–1.25)
GFR SERPL CREATININE-BSD FRML MDRD: 84 ML/MIN/{1.73_M2}
GLUCOSE SERPL-MCNC: 135 MG/DL (ref 70–99)
POTASSIUM SERPL-SCNC: 3.8 MMOL/L (ref 3.4–5.3)
SODIUM SERPL-SCNC: 140 MMOL/L (ref 133–144)

## 2020-12-01 PROCEDURE — 80048 BASIC METABOLIC PNL TOTAL CA: CPT | Performed by: PHYSICIAN ASSISTANT

## 2020-12-01 PROCEDURE — 36415 COLL VENOUS BLD VENIPUNCTURE: CPT | Performed by: PHYSICIAN ASSISTANT

## 2020-12-01 NOTE — RESULT ENCOUNTER NOTE
Will review or did review during clinic visit.  Please see progress note for plan.  Denise Perales PA-C 12/1/2020 4:20 PM

## 2020-12-02 ENCOUNTER — VIRTUAL VISIT (OUTPATIENT)
Dept: CARDIOLOGY | Facility: CLINIC | Age: 60
End: 2020-12-02
Attending: PHYSICIAN ASSISTANT
Payer: COMMERCIAL

## 2020-12-02 DIAGNOSIS — Z95.1 S/P CABG X 3: ICD-10-CM

## 2020-12-02 PROCEDURE — 99214 OFFICE O/P EST MOD 30 MIN: CPT | Mod: 95 | Performed by: PHYSICIAN ASSISTANT

## 2020-12-02 RX ORDER — METOPROLOL TARTRATE 100 MG
100 TABLET ORAL 2 TIMES DAILY
Qty: 160 TABLET | Refills: 3 | Status: SHIPPED | OUTPATIENT
Start: 2020-12-02 | End: 2021-10-29

## 2020-12-02 NOTE — PROGRESS NOTES
Service Date: 2020      PRIMARY CARDIOLOGIST:  Dr. Mendes.      REASON FOR VISIT:  CAD, hypertension follow-up.      HISTORY OF PRESENT ILLNESS:  Mr. Petty is a delightful 60-year-old gentleman with past cardiac history significant for the followin.  Coronary artery disease with stent placed initially in  in [place] at [facility], with recent presentation with unstable angina with severe 3-vessel coronary artery disease with in-stent restenosis of the mid-LAD, severe distal LAD lesion, small circumflex artery and occluded right with left-to-right collaterals.  He was referred for CABG on .  Dr. Herrera performed a LIMA to the LAD, saphenous vein graft to the PDA and saphenous vein graft to the diagonal.  The circumflex was too small to bypass.   2.  Hypertension, with the patient having markedly elevated blood pressures in the 200 systolic over 100 for over a year.   3.  Dyslipidemia.   4.  Type 2 diabetes, new diagnosis.   5.  History of prostate cancer with resection, chemo and radiation.   6.  Normal ejection fraction.  No significant valvular disease.      I met Mr. Petty a couple of weeks ago after he was added on after being found markedly hypertensive during his clinic visit.  He noted at that time, his blood pressures had been horrible for over a year and it has been difficult for people to get them under control.  I increased his losartan to 100 mg a day, as well as amlodipine 10 mg a day.      Today, he comes in and he says he is doing well.  He denies chest pain, shortness of breath, orthopnea or PND.  He has no peripheral edema.  His home blood pressures are running in the 150s/160s and he notes that last time he brought his cuff in, he was running high.  He was recently seen by CV Surgery just on Monday and had a blood pressure of 136/81.  He was seen by primary care a week ago and had a blood pressure of 124/72.  He does note that he has some stomach upset and the feeling of  kind of a stomachache and tightness.  This has been persisting for several days to weeks.  His diarrhea has resolved now that they have changed his metformin.  He has been taking a full-strength aspirin 325 mg daily.  He thinks that drinking water sometimes helps his stomachache not feel so bad.  Eating, though, seems to make his stomach hurt worse.      He is seen today with his wife, Corina, who is a home health care assistant.  One alcoholic beverage a week.      PHYSICAL EXAMINATION:   GENERAL:  Well-developed, well-nourished gentleman, in no acute distress.   HEENT:  Normocephalic, atraumatic.   His speech is fluent.  He does not appear short of breath with discussion.  There is no wheezing noted.  He is jovial.      ASSESSMENT AND PLAN:   1.  Coronary artery disease with recent CABG with good recovery without recurrent anginal symptoms.  The patient is walking 3-4 blocks a day.  He is on appropriate medications.  At this point, he is reluctant to go to cardiac rehab given COVID as he has had several family members with it.  At this point, I am going to see if cardiac rehab can do a virtual intake with him and then give him a little bit more direction for walking at home.  I think it is reasonable for him to do that.  In regards to his aspirin, I am going to cut it down to 81 mg a day as I believe he likely has some gastritis from this.   2.  Hypertension, well-controlled in clinic, with likely falsely elevated blood pressures at home due to inaccurate cuff.  I asked him to get a new cuff.  We will otherwise continue on amlodipine 10 mg daily, losartan 100 mg daily, Lopressor 100 mg b.i.d.  Labs were repeated today and are completely normal.   3.  Hyperlipidemia, not estimated at last visit due to elevated triglycerides.  We will repeat lipid panel before seeing Dr. Mendes.   4.  Stomach upset with possible gastritis.  I asked him to cut his aspirin down to 81 mg, as well as start omeprazole 20 mg once a day before  breakfast or lunch.  If this is not effective, I have asked him to refer to his primary care doctor for possible further workup.  I do not want him necessarily on long-term omeprazole, but he could have some stress gastritis postoperatively as well.      Thank you for allowing me to participate in this delightful patient's care.  He is doing very well postoperatively.  He already has follow-up arranged with Dr. Mendes in January. I am happy to share care if need be.  I am also happy to answer any questions should he have any between now and then.      GEENA Zheng PA-C             D: 2020   T: 2020   MT: al      Name:     ADRIANA AGUILERA   MRN:      [id number]        Account:      [id number]   :      1960           Service Date: 2020      Document: [id number]

## 2020-12-02 NOTE — PROGRESS NOTES
"Adrian Petty is a 60 year old male who is being evaluated via a billable video visit.      The patient has been notified of following:     \"This video visit will be conducted via a call between you and your physician/provider. We have found that certain health care needs can be provided without the need for an in-person physical exam.  This service lets us provide the care you need with a video conversation.  If a prescription is necessary we can send it directly to your pharmacy.  If lab work is needed we can place an order for that and you can then stop by our lab to have the test done at a later time.    Video visits are billed at different rates depending on your insurance coverage.  Please reach out to your insurance provider with any questions.    If during the course of the call the physician/provider feels a video visit is not appropriate, you will not be charged for this service.\"    Patient has given verbal consent for Video visit? Yes  How would you like to obtain your AVS? Mail a copy  If you are dropped from the video visit, the video invite should be resent to: [phone]  Will anyone else be joining your video visit? No        Video-Visit Details    Type of service:  Video Visit    Video Start Time: 313  Video End Time: 3:33 PM    Originating Location (pt. Location): Home    Distant Location (provider location):  [facility] HEART [facility] VIV     Platform used for Video Visit: Brandyn Perales PA-C    Vital signs reported by patient  BP.153/67  P. 67  Wt.219lb  Review Of Systems  Skin: NEGATIVE  Eyes:Ears/Nose/Throat: NEGATIVE  Respiratory: NEGATIVE  Cardiovascular:NEGATIVE  Gastrointestinal: NEGATIVE  Genitourinary:NEGATIVE   Musculoskeletal: NEGATIVE  Neurologic: NEGATIVE  Psychiatric: NEGATIVE  Hematologic/Lymphatic/Immunologic: NEGATIVE  Endocrine:  Diabetes    [id number]  HPI and Plan:   See dictation    Orders this Visit:  No orders of the defined types were placed in this " encounter.    Orders Placed This Encounter   Medications     DISCONTD: omeprazole (PRILOSEC) 20 MG DR capsule     Sig: Take 1 capsule (20 mg) by mouth daily     Dispense:  30 capsule     Refill:  0     metoprolol tartrate (LOPRESSOR) 100 MG tablet     Sig: Take 1 tablet (100 mg) by mouth 2 times daily     Dispense:  160 tablet     Refill:  3     aspirin (ASA) 81 MG EC tablet     Sig: Take 1 tablet (81 mg) by mouth daily     omeprazole (PRILOSEC) 20 MG DR capsule     Sig: Take 1 capsule (20 mg) by mouth daily     Dispense:  30 capsule     Refill:  0     Medications Discontinued During This Encounter   Medication Reason     furosemide (LASIX) 40 MG tablet      metoprolol tartrate (LOPRESSOR) 100 MG tablet      oxyCODONE (ROXICODONE) 5 MG tablet      methocarbamol (ROBAXIN) 750 MG tablet      aspirin (ASA) 325 MG EC tablet      omeprazole (PRILOSEC) 20 MG DR capsule Reorder         Encounter Diagnosis   Name Primary?     S/P CABG x 3        CURRENT MEDICATIONS:  Current Outpatient Medications   Medication Sig Dispense Refill     Alcohol Swabs PADS Use to swab the area of the injection or anamika as directed   Per insurance coverage 100 each 0     amLODIPine (NORVASC) 10 MG tablet Take 1 tablet (10 mg) by mouth daily 90 tablet 3     aspirin (ASA) 81 MG EC tablet Take 1 tablet (81 mg) by mouth daily       blood glucose (NO BRAND SPECIFIED) lancets standard To use to test glucose level in the blood  Use to test blood sugar  2  times daily as directed. To accompany glucose monitor brands per insurance coverage. 100 each 0     blood glucose (NO BRAND SPECIFIED) test strip To use to test glucose level in the blood  Use to test blood sugar  2 times daily as directed. To accompany glucose monitor brands per insurance coverage. 100 each 0     blood glucose calibration (NO BRAND SPECIFIED) solution Used to calibrate the blood glucose monitor as needed and as directed.  To accompany  blood glucose brands per insurance coverage 1  Bottle 0     blood glucose monitoring (NO BRAND SPECIFIED) meter device kit Use as directed   Per insurance coverage 1 kit 0     busPIRone (BUSPAR) 10 MG tablet Take 10 mg by mouth 3 times daily       losartan (COZAAR) 100 MG tablet Take 1 tablet (100 mg) by mouth daily 90 tablet 3     metFORMIN (GLUCOPHAGE) 500 MG tablet Take 1 tablet (500 mg) by mouth 2 times daily (with meals) 180 tablet 3     metoprolol tartrate (LOPRESSOR) 100 MG tablet Take 1 tablet (100 mg) by mouth 2 times daily 160 tablet 3     omeprazole (PRILOSEC) 20 MG DR capsule Take 1 capsule (20 mg) by mouth daily 30 capsule 0     potassium chloride ER (KLOR-CON M) 10 MEQ CR tablet Take 2 tablets (20 mEq) by mouth daily 10 tablet 0     rosuvastatin (CRESTOR) 40 MG tablet Take 1 tablet (40 mg) by mouth daily 90 tablet 3     sertraline (ZOLOFT) 50 MG tablet Take 50 mg by mouth daily       acetaminophen (TYLENOL) 325 MG tablet Take 2 tablets (650 mg) by mouth every 4 hours as needed for other (multimodal surgical pain management along with NSAIDS and opioid medication as indicated based on pain control and physical function.) (Patient not taking: Reported on 12/2/2020)       nitroGLYcerin (NITROSTAT) 0.3 MG sublingual tablet For chest pain place 1 tablet under the tongue every 5 minutes for 3 doses. If symptoms persist 5 minutes after 1st dose call 911. (Patient not taking: Reported on 12/2/2020) 30 tablet 3       ALLERGIES   No Known Allergies    PAST MEDICAL HISTORY:  Past Medical History:   Diagnosis Date     Arthritis      CAD (coronary artery disease) 11/05/2020    3 vessel     Cancer (H) 2010    prostate     DM2 (diabetes mellitus, type 2) (H)     A1C was 8.0 on 11/4/20     Heart disease 2012    stents     HLD (hyperlipidemia)      Hypertension      NSTEMI (non-ST elevated myocardial infarction) (H) 11/05/2020     S/P CABG x 3 11/09/2020    LIMA to LAD, SVG to PDA, SVG to diag       PAST SURGICAL HISTORY:  Past Surgical History:   Procedure  Laterality Date     BACK SURGERY      decompression     BYPASS GRAFT ARTERY CORONARY N/A 11/9/2020    Procedure: CORONARY ARTERY BYPASS GRAFT X 3 (LIMA-LAD, SV-DIAG, SV-PDA), ON PUMP WITH SHAYY READ BY ANESTHESIOLOGIST DR RANGEL.;  Surgeon: Leonardo Herrera MD;  Location:  OR     COLONOSCOPY       CV HEART CATHETERIZATION WITH POSSIBLE INTERVENTION N/A 11/5/2020    Procedure: Heart Catheterization with Possible Intervention with KNAPPER - Schedule at 10:30 AM on Thursday 11/5;  Surgeon: Ted Haider MD;  Location:  HEART CARDIAC CATH LAB     DAVINCI PROSTATECTOMY       GENITOURINARY SURGERY      penile implant     ORTHOPEDIC SURGERY         FAMILY HISTORY:  Family History   Problem Relation Age of Onset     Coronary Artery Disease Father      Diabetes Father      Diabetes Mother      Breast Cancer Mother        SOCIAL HISTORY:  Social History     Socioeconomic History     Marital status:      Spouse name: None     Number of children: None     Years of education: None     Highest education level: None   Occupational History     None   Social Needs     Financial resource strain: None     Food insecurity     Worry: None     Inability: None     Transportation needs     Medical: None     Non-medical: None   Tobacco Use     Smoking status: Never Smoker     Smokeless tobacco: Former User   Substance and Sexual Activity     Alcohol use: Yes     Comment: 1 beer per week     Drug use: Not Currently     Sexual activity: None   Lifestyle     Physical activity     Days per week: None     Minutes per session: None     Stress: None   Relationships     Social connections     Talks on phone: None     Gets together: None     Attends Yarsani service: None     Active member of club or organization: None     Attends meetings of clubs or organizations: None     Relationship status: None     Intimate partner violence     Fear of current or ex partner: None     Emotionally abused: None     Physically  abused: None     Forced sexual activity: None   Other Topics Concern     Parent/sibling w/ CABG, MI or angioplasty before 65F 55M? Not Asked   Social History Narrative     None       Review of Systems:  Skin:        Eyes:       ENT:       Respiratory:       Cardiovascular:       Gastroenterology:      Genitourinary:       Musculoskeletal:       Neurologic:       Psychiatric:       Heme/Lymph/Imm:       Endocrine:         Physical Exam:  Vitals: There were no vitals taken for this visit.   Please refer to dictation for physical exam    Recent Lab Results:  LIPID RESULTS:  Lab Results   Component Value Date    CHOL 239 (H) 11/04/2020    HDL 36 (L) 11/04/2020    LDL  11/04/2020     Cannot estimate LDL when triglyceride exceeds 400 mg/dL    TRIG 459 (H) 11/04/2020       LIVER ENZYME RESULTS:  Lab Results   Component Value Date    AST 36 11/10/2020    ALT 35 11/10/2020       CBC RESULTS:  Lab Results   Component Value Date    WBC 9.3 11/12/2020    RBC 4.28 (L) 11/12/2020    HGB 12.4 (L) 11/12/2020    HCT 38.2 (L) 11/12/2020    MCV 89 11/12/2020    MCH 29.0 11/12/2020    MCHC 32.5 11/12/2020    RDW 14.2 11/12/2020     11/12/2020       BMP RESULTS:  Lab Results   Component Value Date     12/01/2020    POTASSIUM 3.8 12/01/2020    CHLORIDE 107 12/01/2020    CO2 25 12/01/2020    ANIONGAP 8 12/01/2020     (H) 12/01/2020    BUN 13 12/01/2020    CR 0.96 12/01/2020    GFRESTIMATED 84 12/01/2020    GFRESTBLACK >90 12/01/2020    NADEEN 9.1 12/01/2020        A1C RESULTS:  Lab Results   Component Value Date    A1C 7.7 (H) 11/08/2020       INR RESULTS:  Lab Results   Component Value Date    INR 1.19 (H) 11/09/2020    INR 1.42 (H) 11/09/2020           SHAWN Perales PAJOANIE  [id number]  [place] [id number]

## 2020-12-02 NOTE — LETTER
"12/2/2020    Physician No Ref-Primary  No address on file    RE: Adrian Petty       Dear Colleague,    I had the pleasure of seeing Adrian Petty in the [facility].    Adrian Petty is a 60 year old male who is being evaluated via a billable video visit.      The patient has been notified of following:     \"This video visit will be conducted via a call between you and your physician/provider. We have found that certain health care needs can be provided without the need for an in-person physical exam.  This service lets us provide the care you need with a video conversation.  If a prescription is necessary we can send it directly to your pharmacy.  If lab work is needed we can place an order for that and you can then stop by our lab to have the test done at a later time.    Video visits are billed at different rates depending on your insurance coverage.  Please reach out to your insurance provider with any questions.    If during the course of the call the physician/provider feels a video visit is not appropriate, you will not be charged for this service.\"    Patient has given verbal consent for Video visit? Yes  How would you like to obtain your AVS? Mail a copy  If you are dropped from the video visit, the video invite should be resent to: [phone]  Will anyone else be joining your video visit? No        Video-Visit Details    Type of service:  Video Visit    Video Start Time: 313  Video End Time: 3:33 PM    Originating Location (pt. Location): Home    Distant Location (provider location):  [facility]IV     Platform used for Video Visit: Doximity    Denise Perales PA-C    Vital signs reported by patient  BP.153/67  P. 67  Wt.219lb  Review Of Systems  Skin: NEGATIVE  Eyes:Ears/Nose/Throat: NEGATIVE  Respiratory: NEGATIVE  Cardiovascular:NEGATIVE  Gastrointestinal: NEGATIVE  Genitourinary:NEGATIVE   Musculoskeletal: NEGATIVE  Neurologic: " NEGATIVE  Psychiatric: NEGATIVE  Hematologic/Lymphatic/Immunologic: NEGATIVE  Endocrine:  Diabetes    [id number]  HPI and Plan:   See dictation    Orders this Visit:  No orders of the defined types were placed in this encounter.    Orders Placed This Encounter   Medications     DISCONTD: omeprazole (PRILOSEC) 20 MG DR capsule     Sig: Take 1 capsule (20 mg) by mouth daily     Dispense:  30 capsule     Refill:  0     metoprolol tartrate (LOPRESSOR) 100 MG tablet     Sig: Take 1 tablet (100 mg) by mouth 2 times daily     Dispense:  160 tablet     Refill:  3     aspirin (ASA) 81 MG EC tablet     Sig: Take 1 tablet (81 mg) by mouth daily     omeprazole (PRILOSEC) 20 MG DR capsule     Sig: Take 1 capsule (20 mg) by mouth daily     Dispense:  30 capsule     Refill:  0     Medications Discontinued During This Encounter   Medication Reason     furosemide (LASIX) 40 MG tablet      metoprolol tartrate (LOPRESSOR) 100 MG tablet      oxyCODONE (ROXICODONE) 5 MG tablet      methocarbamol (ROBAXIN) 750 MG tablet      aspirin (ASA) 325 MG EC tablet      omeprazole (PRILOSEC) 20 MG DR capsule Reorder         Encounter Diagnosis   Name Primary?     S/P CABG x 3        CURRENT MEDICATIONS:  Current Outpatient Medications   Medication Sig Dispense Refill     Alcohol Swabs PADS Use to swab the area of the injection or anamika as directed   Per insurance coverage 100 each 0     amLODIPine (NORVASC) 10 MG tablet Take 1 tablet (10 mg) by mouth daily 90 tablet 3     aspirin (ASA) 81 MG EC tablet Take 1 tablet (81 mg) by mouth daily       blood glucose (NO BRAND SPECIFIED) lancets standard To use to test glucose level in the blood  Use to test blood sugar  2  times daily as directed. To accompany glucose monitor brands per insurance coverage. 100 each 0     blood glucose (NO BRAND SPECIFIED) test strip To use to test glucose level in the blood  Use to test blood sugar  2 times daily as directed. To accompany glucose monitor brands per insurance  coverage. 100 each 0     blood glucose calibration (NO BRAND SPECIFIED) solution Used to calibrate the blood glucose monitor as needed and as directed.  To accompany  blood glucose brands per insurance coverage 1 Bottle 0     blood glucose monitoring (NO BRAND SPECIFIED) meter device kit Use as directed   Per insurance coverage 1 kit 0     busPIRone (BUSPAR) 10 MG tablet Take 10 mg by mouth 3 times daily       losartan (COZAAR) 100 MG tablet Take 1 tablet (100 mg) by mouth daily 90 tablet 3     metFORMIN (GLUCOPHAGE) 500 MG tablet Take 1 tablet (500 mg) by mouth 2 times daily (with meals) 180 tablet 3     metoprolol tartrate (LOPRESSOR) 100 MG tablet Take 1 tablet (100 mg) by mouth 2 times daily 160 tablet 3     omeprazole (PRILOSEC) 20 MG DR capsule Take 1 capsule (20 mg) by mouth daily 30 capsule 0     potassium chloride ER (KLOR-CON M) 10 MEQ CR tablet Take 2 tablets (20 mEq) by mouth daily 10 tablet 0     rosuvastatin (CRESTOR) 40 MG tablet Take 1 tablet (40 mg) by mouth daily 90 tablet 3     sertraline (ZOLOFT) 50 MG tablet Take 50 mg by mouth daily       acetaminophen (TYLENOL) 325 MG tablet Take 2 tablets (650 mg) by mouth every 4 hours as needed for other (multimodal surgical pain management along with NSAIDS and opioid medication as indicated based on pain control and physical function.) (Patient not taking: Reported on 12/2/2020)       nitroGLYcerin (NITROSTAT) 0.3 MG sublingual tablet For chest pain place 1 tablet under the tongue every 5 minutes for 3 doses. If symptoms persist 5 minutes after 1st dose call 911. (Patient not taking: Reported on 12/2/2020) 30 tablet 3       ALLERGIES   No Known Allergies    PAST MEDICAL HISTORY:  Past Medical History:   Diagnosis Date     Arthritis      CAD (coronary artery disease) 11/05/2020    3 vessel     Cancer (H) 2010    prostate     DM2 (diabetes mellitus, type 2) (H)     A1C was 8.0 on 11/4/20     Heart disease 2012    stents     HLD (hyperlipidemia)       Hypertension      NSTEMI (non-ST elevated myocardial infarction) (H) 11/05/2020     S/P CABG x 3 11/09/2020    LIMA to LAD, SVG to PDA, SVG to diag       PAST SURGICAL HISTORY:  Past Surgical History:   Procedure Laterality Date     BACK SURGERY      decompression     BYPASS GRAFT ARTERY CORONARY N/A 11/9/2020    Procedure: CORONARY ARTERY BYPASS GRAFT X 3 (LIMA-LAD, SV-DIAG, SV-PDA), ON PUMP WITH SHAYY READ BY ANESTHESIOLOGIST DR RANGEL.;  Surgeon: Leonardo Herrera MD;  Location:  OR     COLONOSCOPY       CV HEART CATHETERIZATION WITH POSSIBLE INTERVENTION N/A 11/5/2020    Procedure: Heart Catheterization with Possible Intervention with RETAAPPTIERNEY - Schedule at 10:30 AM on Thursday 11/5;  Surgeon: Ted Haider MD;  Location:  HEART CARDIAC CATH LAB     DAVINCI PROSTATECTOMY       GENITOURINARY SURGERY      penile implant     ORTHOPEDIC SURGERY         FAMILY HISTORY:  Family History   Problem Relation Age of Onset     Coronary Artery Disease Father      Diabetes Father      Diabetes Mother      Breast Cancer Mother        SOCIAL HISTORY:  Social History     Socioeconomic History     Marital status:      Spouse name: None     Number of children: None     Years of education: None     Highest education level: None   Occupational History     None   Social Needs     Financial resource strain: None     Food insecurity     Worry: None     Inability: None     Transportation needs     Medical: None     Non-medical: None   Tobacco Use     Smoking status: Never Smoker     Smokeless tobacco: Former User   Substance and Sexual Activity     Alcohol use: Yes     Comment: 1 beer per week     Drug use: Not Currently     Sexual activity: None   Lifestyle     Physical activity     Days per week: None     Minutes per session: None     Stress: None   Relationships     Social connections     Talks on phone: None     Gets together: None     Attends Mandaeism service: None     Active member of club or  organization: None     Attends meetings of clubs or organizations: None     Relationship status: None     Intimate partner violence     Fear of current or ex partner: None     Emotionally abused: None     Physically abused: None     Forced sexual activity: None   Other Topics Concern     Parent/sibling w/ CABG, MI or angioplasty before 65F 55M? Not Asked   Social History Narrative     None       Review of Systems:  Skin:        Eyes:       ENT:       Respiratory:       Cardiovascular:       Gastroenterology:      Genitourinary:       Musculoskeletal:       Neurologic:       Psychiatric:       Heme/Lymph/Imm:       Endocrine:         Physical Exam:  Vitals: There were no vitals taken for this visit.   Please refer to dictation for physical exam    Recent Lab Results:  LIPID RESULTS:  Lab Results   Component Value Date    CHOL 239 (H) 11/04/2020    HDL 36 (L) 11/04/2020    LDL  11/04/2020     Cannot estimate LDL when triglyceride exceeds 400 mg/dL    TRIG 459 (H) 11/04/2020       LIVER ENZYME RESULTS:  Lab Results   Component Value Date    AST 36 11/10/2020    ALT 35 11/10/2020       CBC RESULTS:  Lab Results   Component Value Date    WBC 9.3 11/12/2020    RBC 4.28 (L) 11/12/2020    HGB 12.4 (L) 11/12/2020    HCT 38.2 (L) 11/12/2020    MCV 89 11/12/2020    MCH 29.0 11/12/2020    MCHC 32.5 11/12/2020    RDW 14.2 11/12/2020     11/12/2020       BMP RESULTS:  Lab Results   Component Value Date     12/01/2020    POTASSIUM 3.8 12/01/2020    CHLORIDE 107 12/01/2020    CO2 25 12/01/2020    ANIONGAP 8 12/01/2020     (H) 12/01/2020    BUN 13 12/01/2020    CR 0.96 12/01/2020    GFRESTIMATED 84 12/01/2020    GFRESTBLACK >90 12/01/2020    NADEEN 9.1 12/01/2020        A1C RESULTS:  Lab Results   Component Value Date    A1C 7.7 (H) 11/08/2020       INR RESULTS:  Lab Results   Component Value Date    INR 1.19 (H) 11/09/2020    INR 1.42 (H) 11/09/2020           CC  Denise Perales, PA-C  [id number]  [id number]  [place] [id number]            Thank you for allowing me to participate in the care of your patient.      Sincerely,     Denise Perales PA-C     [facility]    cc:   Denise Perales PA-C  [id number]  [place] [id number]

## 2020-12-02 NOTE — PROGRESS NOTES
Service Date: 11/18/2020      VIDEO CLINIC VISIT      PRIMARY CARDIOLOGIST:  Will be Dr. Mendes.      REASON FOR VISIT:  Hypertension, CAB followup.      HISTORY OF PRESENT ILLNESS:  Mr. Petty is a delightful 60-year-old gentleman who has a longstanding past cardiac history as follows:   1. Coronary artery disease with stent placed initially in 2003 at [facility] with recent presentation with unstable angina with severe 3-vessel disease with in-stent restenosis of the mid LAD, severe distal LAD lesion, a small circumflex artery and occluded right with left-to-right collaterals.  He was referred for CABG, and 11/09, Dr. Herrera performed a LIMA to LAD, saphenous vein graft to the PDA and saphenous vein graft to the diagonal, as the circumflex was too small to bypass.   2. Hypertension,  patient had markedly elevated blood pressures in the 200s/100s x1 year.   3. Dyslipidemia.   4. New diagnosis of type 2 diabetes.   5. History of prostate cancer with resection, chemo and radiation.      Radu had a fairly typical post-CABG course in the hospital and medications were added.  He had a phone visit with Ana Love RN, and was noted to remain hypertensive in the 160s.  For that reason, he was set up with me today.  I am seeing him today with help from his wife who is a home health aide and is extremely helpful.  Overall, he says he is getting better every single day.  He has not had any angina.  He only has minimal incisional pain.  He is taking oxycodone, just 1 pill at night and otherwise, his pain is controlled.  He has had diarrhea for the last couple days but today, his bowels are normal.  Unfortunately, his blood pressures have been markedly elevated.  The best blood pressure he has had has been 144/87.  They have otherwise been in the 150s to 170s over 80s to 100s.  This has been consistent and he has been taking his medications consistently.  His weight has trended down from 226 pounds on discharge to 218  pounds today.  He is not edematous.  He denies orthopnea or PND.      He tells me that his blood pressures have run in the 200s/100s for at least a year.  He had carpal tunnel surgery a year ago and he left the hospital with markedly elevated blood pressure and they did not particularly seem concerned about following him on that.  His blood pressure in general has been difficult to control for many years.      SOCIAL HISTORY:  He is seen today in a video visit with his wife who is a home health care provider.  One alcoholic beverage a week.      PHYSICAL EXAMINATION:   GENERAL:  patient appears to be resting comfortably.  His speech is fluent.   HEENT:  He is normocephalic, atraumatic.   NECK:  He does not appear visibly short of breath.      ASSESSMENT AND PLAN:   1. Coronary artery disease status post 3-vessel CAB with a LIMA to the LAD, saphenous vein graft to the PDA and saphenous vein graft to the diagonal.  Appropriately on aspirin and Crestor 40.  No recurrent anginal symptoms and healing well.   2. Hypertension, longstanding and difficult to control.  He remains markedly hypertensive today and we are going to increase his losartan to 100 mg from 50 and then the day after, his amlodipine from 5 mg to 10.  I am hopeful this will get him close on his blood pressure but it may not.  After that, the next step would be likely switching him off of Lopressor to carvedilol 25 mg b.i.d., using possibly a stronger ARB or additional medications.  He is on Lasix at this time and will need repeat labs given the increase in losartan.   3. Dyslipidemia, appropriately on Crestor.  We will do a repeat lipid panel in 3 months.   4. Type 2 diabetes.  New diagnosis.  To be followed by primary.   5. Normal EF.  He was noted to have LVH on his echocardiogram which I suspect is from longstanding hypertension.      Thank you for allowing me to participate in this patient's care.  I will do a video visit with him in 2 weeks.  He  should also get a BMP done before that visit.  He will call next week if his blood pressures are consistently above 160.      GEENA Zheng PA-C             D: 2020   T: 2020   MT: JOHN      Name:     ADRIANA AGUILERA   MRN:      [id number]        Account:      [id number]   :      1960           Service Date: 2020      Document: [id number]

## 2020-12-05 ENCOUNTER — MYC MEDICAL ADVICE (OUTPATIENT)
Dept: CARDIOLOGY | Facility: CLINIC | Age: 60
End: 2020-12-05

## 2020-12-07 NOTE — TELEPHONE ENCOUNTER
Irina message received. Will forward to Denise More to address.     Message:  Good Morning Denise,     I received my medicine from my Pharmacy  this morning( Sat)   and I noticed a medicine that was included I was unaware of.  The Medicine was Triamterene 37.5mg/hctz 25mg caps for 90 tablets.  As I did more research I found that my Dr at the [facility] had prescribed that to me this past Feb.  Do you feel I should stay on this medicine or should I just cancel that.       I will not use this until I hear from you.     Thank you very much for all your help

## 2020-12-10 ENCOUNTER — PATIENT OUTREACH (OUTPATIENT)
Dept: EDUCATION SERVICES | Facility: CLINIC | Age: 60
End: 2020-12-10
Attending: INTERNAL MEDICINE
Payer: COMMERCIAL

## 2020-12-10 ENCOUNTER — TELEPHONE (OUTPATIENT)
Dept: EDUCATION SERVICES | Facility: CLINIC | Age: 60
End: 2020-12-10

## 2020-12-10 DIAGNOSIS — E11.69 TYPE 2 DIABETES MELLITUS WITH OTHER SPECIFIED COMPLICATION, WITHOUT LONG-TERM CURRENT USE OF INSULIN (H): Primary | ICD-10-CM

## 2020-12-10 DIAGNOSIS — E11.69 TYPE 2 DIABETES MELLITUS WITH OTHER SPECIFIED COMPLICATION, WITHOUT LONG-TERM CURRENT USE OF INSULIN (H): ICD-10-CM

## 2020-12-10 PROCEDURE — G0108 DIAB MANAGE TRN  PER INDIV: HCPCS | Mod: 95

## 2020-12-10 RX ORDER — METFORMIN HCL 500 MG
500 TABLET, EXTENDED RELEASE 24 HR ORAL 2 TIMES DAILY WITH MEALS
Qty: 180 TABLET | Refills: 1 | Status: SHIPPED | OUTPATIENT
Start: 2020-12-10 | End: 2021-06-03

## 2020-12-10 NOTE — PATIENT INSTRUCTIONS
MY DIABETES TODAY:    1)  Goal A1C is under <7.0  Mine is:      Lab Results   Component Value Date    A1C 7.7 11/08/2020       2)  Goal LDL (bad cholesterol) under 100  (measured at least yearly)- I am currently at:   Lab Results   Component Value Date    LDL  11/04/2020     Cannot estimate LDL when triglyceride exceeds 400 mg/dL       3)  Goal blood pressure under 130/80- mine was Data Unavailable today    Care Plan:  Meal Plan Recommendation: eat 3 meals a day, have small snacks between meals, if needed, use portion control and Aim for 3-4 carb servings at meals and 0-1 carb servings at snacks  Exercise / activity plan: As able, goal is 30 min daily  Check blood sugars 1-2x/day    Follow up:  Call ([phone]), e-mail ([email or web address]), or send MyChart message with questions, concerns or if follow-up is needed.  Follow-up for annual diabetes education review in 1 year or sooner, if needed.     Bring blood glucose meter and logbook with you to all doctor and follow-up appointments.     [facility] for the [facility]:  For Your Diabetes or Nutrition Education Appointments Call:  [phone]   For Diabetes or Nutrition Related Questions:   [phone]  Send MyChart Message   If you need a medication refill please contact your pharmacy. Please allow 3 business days for your refills to be completed.

## 2020-12-10 NOTE — PROGRESS NOTES
"Diabetes Self-Management Education & Support  Patient verbally consented to the telephone visit service today: yes    Presents for: Initial Assessment for new diagnosis    SUBJECTIVE/OBJECTIVE:  Presents for: Initial Assessment for new diagnosis  Accompanied by: Self  Diabetes education in the past 24mo: No  Diabetes type: Type 2  Other concerns:: None  Cultural Influences/Ethnic Background:  American      Diabetes Symptoms & Complications:     Complications assessed today?: No    Patient Problem List and Family Medical History reviewed for relevant medical history, current medical status, and diabetes risk factors.    Vitals:  There were no vitals taken for this visit.  Estimated body mass index is 31.28 kg/m  as calculated from the following:    Height as of 11/24/20: 1.778 m (5' 10\").    Weight as of 11/24/20: 98.9 kg (218 lb).   Last 3 BP:   BP Readings from Last 3 Encounters:   11/24/20 124/72   11/14/20 (!) 148/83   11/03/20 (!) 195/108       History   Smoking Status     Never Smoker   Smokeless Tobacco     Former User       Labs:  Lab Results   Component Value Date    A1C 7.7 11/08/2020     Lab Results   Component Value Date     12/01/2020     Lab Results   Component Value Date    LDL  11/04/2020     Cannot estimate LDL when triglyceride exceeds 400 mg/dL     HDL Cholesterol   Date Value Ref Range Status   11/04/2020 36 (L) >39 mg/dL Final   ]  GFR Estimate   Date Value Ref Range Status   12/01/2020 84 >60 mL/min/[1.73_m2] Final     Comment:     Non  GFR Calc  Starting 12/18/2018, serum creatinine based estimated GFR (eGFR) will be   calculated using the Chronic Kidney Disease Epidemiology Collaboration   (CKD-EPI) equation.       GFR Estimate If Black   Date Value Ref Range Status   12/01/2020 >90 >60 mL/min/[1.73_m2] Final     Comment:      GFR Calc  Starting 12/18/2018, serum creatinine based estimated GFR (eGFR) will be   calculated using the Chronic Kidney Disease " Epidemiology Collaboration   (CKD-EPI) equation.       Lab Results   Component Value Date    CR 0.96 12/01/2020     No results found for: MICROALBUMIN    Healthy Eating:  Healthy Eating Assessed Today: Yes  Cultural/Yazdanism diet restrictions?: No  Meal planning/habits: None, Smaller portions  Meals include: Breakfast, Lunch, Dinner  Breakfast: 2 eggs, 1 slice toast, 8-12oz OJ OR cereal, milk  Lunch: sandwich - ham OR salad, water/diet soda  Dinner: chicken/pork, beans/veggies, sausage, milk  Snacks: Italian Ice  Beverages: Milk, Juice, Diet soda  Has patient met with a dietitian in the past?: No    Being Active:  Being Active Assessed Today: Yes  Exercise:: Yes  Days per week of moderate to strenuous exercise (like a brisk walk): 7  On average, minutes per day of exercise at this level: 20  How intense was your typical exercise? : Light (like stretching or slow walking)  Exercise Minutes per Week: 140  Barrier to exercise: Other(recent open heart surgery)    Monitoring:  Monitoring Assessed Today: Yes  Did patient bring glucose meter to appointment? : Yes  Blood Glucose Meter: Accu-chek  Times checking blood sugar at home (number): 2  Times checking blood sugar at home (per): Day    Fasting and pre-dinner BGs are in the 106-133 range.    Taking Medications:  Diabetes Medication(s)     Biguanides       metFORMIN (GLUCOPHAGE) 500 MG tablet    Take 1 tablet (500 mg) by mouth 2 times daily (with meals)          Taking Medication Assessed Today: Yes  Current Treatments: Oral Medication (taken by mouth)  Problems taking diabetes medications regularly?: No  Diabetes medication side effects?: Yes    Problem Solving:  Problem Solving Assessed Today: No  Is the patient at risk for hypoglycemia?: No  Is the patient at risk for DKA?: No              Reducing Risks:  Reducing Risks Assessed Today: Yes  Diabetes Risks: Age over 45 years, Family History  CAD Risks: Diabetes Mellitus, Family history, Male sex  Has dilated eye  exam at least once a year?: Yes  Sees dentist every 6 months?: Yes  Feet checked by healthcare provider in the last year?: Yes    Healthy Coping:  Healthy Coping Assessed Today: Yes  Emotional response to diabetes: Ready to learn  Stage of change: PREPARATION (Decided to change - considering how)  Patient Activation Measure Survey Score:  No flowsheet data found.    Diabetes knowledge and skills assessment:   Patient is knowledgeable in diabetes management concepts related to: Being Active, Monitoring and Taking Medication    Patient needs further education on the following diabetes management concepts: Healthy Eating, Monitoring, Taking Medication, Problem Solving and Reducing Risks    Based on learning assessment above, most appropriate setting for further diabetes education would be: Group class or Individual setting.      INTERVENTIONS:    Education provided today on:  AADE Self-Care Behaviors:  Diabetes Pathophysiology  Healthy Eating: carbohydrate counting, consistency in amount, composition, and timing of food intake, portion control and label reading  Being Active: relationship to blood glucose  Monitoring: individual blood glucose targets and frequency of monitoring  Taking Medication: action of prescribed medication, side effects of prescribed medications and when to take medications    Opportunities for ongoing education and support in diabetes-self management were discussed.    Pt verbalized understanding of concepts discussed and recommendations provided today.       Education Materials Provided:  [place] Understanding Diabetes Booklet      ASSESSMENT:  Patient with new dx of Type 2 diabetes.  Diagnosed while in the hospital for open heart surgery.  Pt notes significant family hx of diabetes.  Needs comprehensive education.  Today we discussed pathophysiology, carb counting, exercise, BG/A1c targets and reducing risks.  Pt's main concern is intolerance of Metformin.  Is open to trying extended release  version.  Will send note to MD.        Patient's most recent   Lab Results   Component Value Date    A1C 7.7 11/08/2020    is not meeting goal of <7.0    PLAN  See Patient Instructions for co-developed, patient-stated behavior change goals.      HUY Gonzalez CDE    Time Spent: 60 minutes  Encounter Type: Individual    Any diabetes medication dose changes were made via the CDE Protocol and Collaborative Practice Agreement with the patient's referring provider. A copy of this encounter was shared with the provider.

## 2021-01-03 ENCOUNTER — MYC MEDICAL ADVICE (OUTPATIENT)
Dept: CARDIOLOGY | Facility: CLINIC | Age: 61
End: 2021-01-03

## 2021-01-05 ENCOUNTER — TELEPHONE (OUTPATIENT)
Dept: CARDIOLOGY | Facility: CLINIC | Age: 61
End: 2021-01-05

## 2021-01-05 NOTE — TELEPHONE ENCOUNTER
Fax sent to [facility] to deny refill of Omeprazole. It was only meant to be for a short time.

## 2021-01-09 ENCOUNTER — HEALTH MAINTENANCE LETTER (OUTPATIENT)
Age: 61
End: 2021-01-09

## 2021-01-12 DIAGNOSIS — E11.9 TYPE 2 DIABETES MELLITUS WITHOUT COMPLICATION, WITHOUT LONG-TERM CURRENT USE OF INSULIN (H): Primary | ICD-10-CM

## 2021-01-12 RX ORDER — GLIPIZIDE 2.5 MG/1
2.5 TABLET, EXTENDED RELEASE ORAL DAILY
Qty: 90 TABLET | Refills: 0 | Status: SHIPPED | OUTPATIENT
Start: 2021-01-12 | End: 2021-03-29

## 2021-01-14 ENCOUNTER — OFFICE VISIT (OUTPATIENT)
Dept: CARDIOLOGY | Facility: CLINIC | Age: 61
End: 2021-01-14
Payer: COMMERCIAL

## 2021-01-14 VITALS
HEIGHT: 70 IN | SYSTOLIC BLOOD PRESSURE: 139 MMHG | WEIGHT: 220 LBS | DIASTOLIC BLOOD PRESSURE: 88 MMHG | HEART RATE: 64 BPM | BODY MASS INDEX: 31.5 KG/M2

## 2021-01-14 DIAGNOSIS — I25.810 CORONARY ARTERY DISEASE INVOLVING AUTOLOGOUS ARTERY CORONARY BYPASS GRAFT WITHOUT ANGINA PECTORIS: Primary | ICD-10-CM

## 2021-01-14 PROCEDURE — 99213 OFFICE O/P EST LOW 20 MIN: CPT | Performed by: INTERNAL MEDICINE

## 2021-01-14 ASSESSMENT — MIFFLIN-ST. JEOR: SCORE: 1804.16

## 2021-01-14 NOTE — PROGRESS NOTES
CARDIOLOGY CLINIC CONSULTATION    REASON FOR CONSULT: Coronary artery disease and bypass surgery    PRIMARY CARE PHYSICIAN:  Emma Barlow    HISTORY OF PRESENT ILLNESS:  Mr. Petty is a delightful 60-year-old gentleman with past cardiac history significant for the followin.  Coronary artery disease with stent placed initially in  in [place] at [facility], with recent presentation with unstable angina/small non-ST elevation MI with a troponin of 0.12 with severe 3-vessel coronary artery disease with in-stent restenosis of the mid-LAD, severe distal LAD lesion, small circumflex artery and occluded right with left-to-right collaterals.  He was referred for CABG on .  Dr. Herrera performed a LIMA to the LAD, saphenous vein graft to the PDA and saphenous vein graft to the diagonal.  The circumflex was too small to bypass.   2.  Hypertension, with the patient having markedly elevated blood pressures in the 200 systolic over 100 for over a year.  Under better control now  3.  Dyslipidemia.   4.  Type 2 diabetes, new diagnosis.   5.  History of prostate cancer with resection, chemo and radiation.   6.  Normal ejection fraction.  No significant valvular disease.     The patient is here for routine follow-up.  He saw me in the hospital in consultation and wants to follow-up with me.  Denies any cardiovascular symptoms and follow-up.  No chest pain syncope presyncope shortness of breath.  He does have some tingling around the site of the sternal incision but overall has been healing okay.  He has been having some abdominal side effects from Metformin.  Trista had reduced his aspirin down to 81 mg due to possible gastritis.  No bleeding problems reported.    PAST MEDICAL HISTORY:  Past Medical History:   Diagnosis Date     Arthritis      CAD (coronary artery disease) 2020    3 vessel     Cancer (H)     prostate     DM2 (diabetes mellitus, type 2) (H)     A1C was 8.0 on 20     Heart disease      stents     HLD (hyperlipidemia)      Hypertension      NSTEMI (non-ST elevated myocardial infarction) (H) 11/05/2020     S/P CABG x 3 11/09/2020    LIMA to LAD, SVG to PDA, SVG to diag       MEDICATIONS:  Current Outpatient Medications   Medication     Alcohol Swabs PADS     amLODIPine (NORVASC) 10 MG tablet     aspirin (ASA) 81 MG EC tablet     blood glucose (NO BRAND SPECIFIED) lancets standard     blood glucose (NO BRAND SPECIFIED) test strip     blood glucose calibration (NO BRAND SPECIFIED) solution     blood glucose monitoring (NO BRAND SPECIFIED) meter device kit     busPIRone (BUSPAR) 10 MG tablet     glipiZIDE (GLUCOTROL XL) 2.5 MG 24 hr tablet     losartan (COZAAR) 100 MG tablet     metFORMIN (GLUCOPHAGE-XR) 500 MG 24 hr tablet     metoprolol tartrate (LOPRESSOR) 100 MG tablet     potassium chloride ER (KLOR-CON M) 10 MEQ CR tablet     rosuvastatin (CRESTOR) 40 MG tablet     sertraline (ZOLOFT) 50 MG tablet     acetaminophen (TYLENOL) 325 MG tablet     nitroGLYcerin (NITROSTAT) 0.3 MG sublingual tablet     No current facility-administered medications for this visit.        ALLERGIES:  No Known Allergies    SOCIAL HISTORY:  I have reviewed this patient's social history and updated it with pertinent information if needed. Adrian Sinha Malinda  reports that he has never smoked. He has quit using smokeless tobacco. He reports current alcohol use. He reports previous drug use.    FAMILY HISTORY:  I have reviewed this patient's family history and updated it with pertinent information if needed.   Family History   Problem Relation Age of Onset     Coronary Artery Disease Father      Diabetes Father      Diabetes Mother      Breast Cancer Mother        REVIEW OF SYSTEMS:  Skin:  Negative     Eyes:  Negative    ENT:  Negative    Respiratory:  Negative    Cardiovascular:    Positive for;chest pain  Gastroenterology: Negative    Genitourinary:  Positive for prostate problem  Musculoskeletal:  Negative     Neurologic:  Negative    Psychiatric:  Negative    Heme/Lymph/Imm:  Negative    Endocrine:  Positive for diabetes      PHYSICAL EXAM:      BP: 139/88 Pulse: 64            Vital Signs with Ranges  Pulse:  [64] 64  BP: (139)/(88) 139/88  220 lbs 0 oz    Constitutional: awake, alert, no distress  Eyes: sclera nonicteric  ENT: trachea midline  Respiratory: Clear to auscultation bilaterally  Cardiovascular: Normal heart sounds.  Soft systolic murmur no rubs or gallops.  JVP is normal no edema.  GI: nondistended, nontender  Lymph/Hematologic: no bleeding signs  Musculoskeletal: good muscle tone, strength 5/5 in upper and lower extremities  Neuropsychiatric: appropriate affact    DATA:  Labs: Pertinent cardiac labs reviewed    Echocardiogram: Normal LV function    ASSESSMENT:  Coronary artery disease status post three-vessel bypass surgery November 2020  Uncontrolled hypertension  Diabetes  Prior history of prostate cancer  Obesity  Hypertriglyceridemia    RECOMMENDATIONS:  1. At this point the patient is doing well after bypass surgery.  The sternal incision seems to be of healed nicely.  Denies any anginal or heart failure symptoms.  2. At this point I recommend continuing aspirin and statin for life.  3. We will check basic labs including basic metabolic panel CBC and lipid panel to establish baseline after surgery.  LDL goal of 50-70.  4. If the triglyceride levels are disproportionately high, recommend initiation of Vascepa.  5. I have sent out a note to his primary physician to see if they can consider Jardiance  6. His blood pressure numbers are better.  To follow-up with PCP.  He tells me that his home blood pressure cuff is not working well and I told him to get it checked and looked at.  7. See us back in 6 months with an echocardiogram sooner if anything changes clinically.    PAMELLA Ferraro, [facility]  Cardiology - [facility]  January 14, 2021

## 2021-01-19 DIAGNOSIS — I25.810 CORONARY ARTERY DISEASE INVOLVING AUTOLOGOUS ARTERY CORONARY BYPASS GRAFT WITHOUT ANGINA PECTORIS: ICD-10-CM

## 2021-01-19 LAB
ALT SERPL W P-5'-P-CCNC: 22 U/L (ref 0–70)
ANION GAP SERPL CALCULATED.3IONS-SCNC: 7 MMOL/L (ref 3–14)
BUN SERPL-MCNC: 17 MG/DL (ref 7–30)
CALCIUM SERPL-MCNC: 8.9 MG/DL (ref 8.5–10.1)
CHLORIDE SERPL-SCNC: 105 MMOL/L (ref 94–109)
CHOLEST SERPL-MCNC: 128 MG/DL
CO2 SERPL-SCNC: 28 MMOL/L (ref 20–32)
CREAT SERPL-MCNC: 1.01 MG/DL (ref 0.66–1.25)
ERYTHROCYTE [DISTWIDTH] IN BLOOD BY AUTOMATED COUNT: 13.4 % (ref 10–15)
GFR SERPL CREATININE-BSD FRML MDRD: 79 ML/MIN/{1.73_M2}
GLUCOSE SERPL-MCNC: 135 MG/DL (ref 70–99)
HBA1C MFR BLD: 6.2 % (ref 0–5.6)
HCT VFR BLD AUTO: 43.4 % (ref 40–53)
HDLC SERPL-MCNC: 37 MG/DL
HGB BLD-MCNC: 13.8 G/DL (ref 13.3–17.7)
LDLC SERPL CALC-MCNC: 53 MG/DL
MCH RBC QN AUTO: 27.2 PG (ref 26.5–33)
MCHC RBC AUTO-ENTMCNC: 31.8 G/DL (ref 31.5–36.5)
MCV RBC AUTO: 85 FL (ref 78–100)
NONHDLC SERPL-MCNC: 91 MG/DL
PLATELET # BLD AUTO: 303 10E9/L (ref 150–450)
POTASSIUM SERPL-SCNC: 3.2 MMOL/L (ref 3.4–5.3)
RBC # BLD AUTO: 5.08 10E12/L (ref 4.4–5.9)
SODIUM SERPL-SCNC: 140 MMOL/L (ref 133–144)
TRIGL SERPL-MCNC: 191 MG/DL
WBC # BLD AUTO: 6.9 10E9/L (ref 4–11)

## 2021-01-19 PROCEDURE — 84460 ALANINE AMINO (ALT) (SGPT): CPT | Performed by: INTERNAL MEDICINE

## 2021-01-19 PROCEDURE — 36415 COLL VENOUS BLD VENIPUNCTURE: CPT | Performed by: INTERNAL MEDICINE

## 2021-01-19 PROCEDURE — 85027 COMPLETE CBC AUTOMATED: CPT | Performed by: INTERNAL MEDICINE

## 2021-01-19 PROCEDURE — 83036 HEMOGLOBIN GLYCOSYLATED A1C: CPT | Performed by: INTERNAL MEDICINE

## 2021-01-19 PROCEDURE — 80048 BASIC METABOLIC PNL TOTAL CA: CPT | Performed by: INTERNAL MEDICINE

## 2021-01-19 PROCEDURE — 80061 LIPID PANEL: CPT | Performed by: INTERNAL MEDICINE

## 2021-01-26 NOTE — RESULT ENCOUNTER NOTE
Dr Mendes thank you for seeing patient, will follow up on his potassium levels, he was having some diarrhea issues that may have contributed to low potassium, but definitely will consider hyperlado if persistent low potassium levels.  Thank you  olga

## 2021-01-27 ENCOUNTER — CARE COORDINATION (OUTPATIENT)
Dept: CARDIOLOGY | Facility: CLINIC | Age: 61
End: 2021-01-27

## 2021-01-27 NOTE — PROGRESS NOTES
Andrea Mendes MD   1/20/2021  4:57 PM CST      1. Please ensure patient had taken his K the day of the tests. If he had, then increase to 20 BID other wise no changes.  2. Will ask opinion from Dr. Barlow to see if he should be worked up for hyperaldo. He is not on any diuretic and there is no reason for him to be hypokalemic while on losartan. Thoughts Kamil?      Thanks, Andrea

## 2021-01-27 NOTE — PROGRESS NOTES
I called pt and let him know  has messaged . He will discuss low K and possible jardiance at OV on 2/9/21. Otherwise, pt will have an echo and  follow up with  in 6 months. Henry GRAHAM January 27, 2021, 2:13 PM

## 2021-01-27 NOTE — PROGRESS NOTES
Pt called back and told me he did not take his 20 meq KCL the morning of his labs on 1/20/21. Pt said he has had issues with low potassium for a long time, but doesn't think he ever had a work up for it. I told pt  sent  an update. Pt has a 2/9/21 visit with  and can discuss further at that time.    Pt said  also mentioned a possible diabetic medication he should start, but pt said he never heard from PCP to discuss. I told pt that it was jardiance, and that  can also discuss that at 2/9/21 visit. Pt wanted to update  that his BP has been in the 120s/70s since he he has been checking it at home routinely.   Henry GRAHAM January 27, 2021, 10:55 AM

## 2021-02-04 ENCOUNTER — MYC MEDICAL ADVICE (OUTPATIENT)
Dept: FAMILY MEDICINE | Facility: CLINIC | Age: 61
End: 2021-02-04

## 2021-02-04 DIAGNOSIS — Z95.1 S/P CABG X 3: ICD-10-CM

## 2021-02-06 NOTE — TELEPHONE ENCOUNTER
Pratik can you PLEASE help with this? A referral??  He is currently out of the strips.  I have them and the pharmacy pended just hoping you can get it through [facility]?    Thanks!    Elida Quevedo RN  [facility]

## 2021-02-09 NOTE — TELEPHONE ENCOUNTER
Unsure if prior authorization would be needed as test strip Rx is pended as generic to dispense whichever brand is covered by insurance. Will send to PCP for signature and will await paperwork from pharmacy if test strips aren't covered by insurance.    Pratik Cervantes, MARGIE on 2/9/2021 at 11:08 AM

## 2021-02-10 ENCOUNTER — OFFICE VISIT (OUTPATIENT)
Dept: FAMILY MEDICINE | Facility: CLINIC | Age: 61
End: 2021-02-10
Payer: COMMERCIAL

## 2021-02-10 VITALS
DIASTOLIC BLOOD PRESSURE: 70 MMHG | OXYGEN SATURATION: 97 % | WEIGHT: 218 LBS | HEART RATE: 61 BPM | SYSTOLIC BLOOD PRESSURE: 120 MMHG | BODY MASS INDEX: 31.21 KG/M2 | TEMPERATURE: 97.7 F | HEIGHT: 70 IN

## 2021-02-10 DIAGNOSIS — R06.83 SNORING: ICD-10-CM

## 2021-02-10 DIAGNOSIS — E87.6 LOW BLOOD POTASSIUM: ICD-10-CM

## 2021-02-10 DIAGNOSIS — I25.790 CORONARY ARTERY DISEASE INVOLVING OTHER CORONARY ARTERY BYPASS GRAFT WITH UNSTABLE ANGINA PECTORIS (H): ICD-10-CM

## 2021-02-10 DIAGNOSIS — R94.31 PROLONGED QT INTERVAL: ICD-10-CM

## 2021-02-10 DIAGNOSIS — E11.69 TYPE 2 DIABETES MELLITUS WITH OTHER SPECIFIED COMPLICATION, WITHOUT LONG-TERM CURRENT USE OF INSULIN (H): Primary | ICD-10-CM

## 2021-02-10 DIAGNOSIS — I10 ESSENTIAL HYPERTENSION: ICD-10-CM

## 2021-02-10 DIAGNOSIS — I25.110 CORONARY ARTERY DISEASE INVOLVING NATIVE CORONARY ARTERY OF NATIVE HEART WITH UNSTABLE ANGINA PECTORIS (H): ICD-10-CM

## 2021-02-10 DIAGNOSIS — R53.83 FATIGUE, UNSPECIFIED TYPE: ICD-10-CM

## 2021-02-10 DIAGNOSIS — Z12.11 COLON CANCER SCREENING: ICD-10-CM

## 2021-02-10 DIAGNOSIS — Z12.5 SCREENING FOR PROSTATE CANCER: ICD-10-CM

## 2021-02-10 LAB
PSA SERPL-ACNC: 0.16 UG/L (ref 0–4)
TSH SERPL DL<=0.005 MIU/L-ACNC: 0.95 MU/L (ref 0.4–4)

## 2021-02-10 PROCEDURE — G0103 PSA SCREENING: HCPCS | Performed by: INTERNAL MEDICINE

## 2021-02-10 PROCEDURE — 36415 COLL VENOUS BLD VENIPUNCTURE: CPT | Performed by: INTERNAL MEDICINE

## 2021-02-10 PROCEDURE — 82088 ASSAY OF ALDOSTERONE: CPT | Performed by: INTERNAL MEDICINE

## 2021-02-10 PROCEDURE — 80048 BASIC METABOLIC PNL TOTAL CA: CPT | Performed by: INTERNAL MEDICINE

## 2021-02-10 PROCEDURE — 99000 SPECIMEN HANDLING OFFICE-LAB: CPT | Performed by: INTERNAL MEDICINE

## 2021-02-10 PROCEDURE — 84244 ASSAY OF RENIN: CPT | Mod: 90 | Performed by: INTERNAL MEDICINE

## 2021-02-10 PROCEDURE — 93000 ELECTROCARDIOGRAM COMPLETE: CPT | Performed by: INTERNAL MEDICINE

## 2021-02-10 PROCEDURE — 99N1160 PR STATISICAL ALDOSTERONE/RENIN RATIO: Performed by: INTERNAL MEDICINE

## 2021-02-10 PROCEDURE — 99214 OFFICE O/P EST MOD 30 MIN: CPT | Performed by: INTERNAL MEDICINE

## 2021-02-10 PROCEDURE — 84443 ASSAY THYROID STIM HORMONE: CPT | Performed by: INTERNAL MEDICINE

## 2021-02-10 ASSESSMENT — MIFFLIN-ST. JEOR: SCORE: 1795.09

## 2021-02-10 NOTE — PROGRESS NOTES
Assessment & Plan   Problem List Items Addressed This Visit        Nervous and Auditory    Coronary artery disease involving native coronary artery of native heart with unstable angina pectoris (H)    Coronary artery disease involving other coronary artery bypass graft with unstable angina pectoris (H)       Endocrine    Diabetes mellitus, type 2 (H) - Primary    Relevant Orders    **Basic metabolic panel FUTURE anytime      Other Visit Diagnoses     Colon cancer screening        Relevant Orders    Fecal colorectal cancer screen FIT    Prolonged QT interval        Relevant Orders    EKG 12-lead complete w/read - Clinics (Completed)    Low blood potassium        Relevant Orders    **Basic metabolic panel FUTURE anytime    Snoring        Relevant Orders    SLEEP EVALUATION & MANAGEMENT REFERRAL - [facility] [phone]  (Age 18 and up)    Fatigue, unspecified type        Relevant Orders    SLEEP EVALUATION & MANAGEMENT REFERRAL - [facility] [phone]  (Age 18 and up)    TSH (Completed)    Essential hypertension        Relevant Orders    Aldosterone (Completed)    Renin activity (Completed)    Aldosterone Renin Ratio (Completed)    Screening for prostate cancer        Relevant Orders    PSA, screen (Completed)         Taking metformin taking only once daily , some GI upset, no loose stools, will discuss with DE  Advised to try some probiotics for bloating,   On glipizide, 2.5 mg, no low sugars, -144 - 136  No tingling numbness, but Soreness at site of chest surgery  No CP. No dizziness, tired a lot, will check TSH, CBC,   Sleep better, some snoring, adjust bed, and stopped snoring, possible sleep apnea, has also nocturia x3   --will need sleep eval  No weakness with void stream, had prostatectomy, PSA slight rise 0.17, ; had prostatectomy had chemothx and rad thx twice  BP HAS BEEN GOOD,/80, 135/80, 133/75, 138/74, HR LOW 60'S  Not seen  "eye doc, planing to do eye check, had eye check 6 months(didnt have diabetes at time)  Not in cardiac rehab. Walking and exercise,   Wean on Buspar, doesn't feel he needs, on sertraline 50 mg working well for him, QTc 477 WILL REPEAT EKG  ELEVATED ALDOSTERONE, RECHECK, LOW POTASSIUM, CONSIDER REFERRAL TO KIDNEY SPECIALIST if persistent elevation of aldos  Does not feel needs buspirone and sertraline, wean buspirone gradually ,          Work on weight loss  Regular exercise  See Patient Instructions    No follow-ups on file.    Emma Barlow MD  [facility] VIV Lovelace is a 60 year old who presents for the following health issues   HPI     Chief Complaint   Patient presents with     Follow Up     Health Maintenance     eye [place] 2/2020 , Has Adv Directive will bring in, Colonoscopy thr [facility] 2 years ago but no report found. Will do FIT        Follow up for diabetes, heart condition, and concerns with being tired, some UGI upset from metfrmin getting better  No H/A, no CP, no dizziness.     Review of Systems   Constitutional, HEENT, cardiovascular, pulmonary, GI, , musculoskeletal, neuro, skin, endocrine and psych systems are negative, except as otherwise noted.      Objective    /70 (BP Location: Left arm, Patient Position: Chair, Cuff Size: Adult Large)   Pulse 61   Temp 97.7  F (36.5  C) (Temporal)   Ht 1.778 m (5' 10\")   Wt 98.9 kg (218 lb)   SpO2 97%   BMI 31.28 kg/m    Body mass index is 31.28 kg/m .  Physical Exam   GENERAL: healthy, alert and no distress  EYES: Eyes grossly normal to inspection, PERRL and conjunctivae and sclerae normal  HENT: ear canals and TM's normal, nose and mouth without ulcers or lesions  NECK: no adenopathy, no asymmetry, masses, or scars and thyroid normal to palpation  RESP: lungs clear to auscultation - no rales, rhonchi or wheezes  CV: regular rate and rhythm, normal S1 S2, no S3 or S4, no murmur, click or rub, no peripheral edema and " peripheral pulses strong  ABDOMEN: soft, nontender, no hepatosplenomegaly, no masses and bowel sounds normal  MS: no gross musculoskeletal defects noted, no edema  SKIN: no suspicious lesions or rashes  NEURO: Normal strength and tone, mentation intact and speech normal  PSYCH: mentation appears normal, affect normal/bright  Diabetic foot exam intact sensation to monofilament, pulses + 2 DP/PT, no skin cracks or fissures or ulcers, no deformities or calusses  Orders Only on 01/19/2021   Component Date Value Ref Range Status     Hemoglobin A1C 01/19/2021 6.2* 0 - 5.6 % Final    Comment: Normal <5.7% Prediabetes 5.7-6.4%  Diabetes 6.5% or higher - adopted from ADA   consensus guidelines.       WBC 01/19/2021 6.9  4.0 - 11.0 10e9/L Final     RBC Count 01/19/2021 5.08  4.4 - 5.9 10e12/L Final     Hemoglobin 01/19/2021 13.8  13.3 - 17.7 g/dL Final     Hematocrit 01/19/2021 43.4  40.0 - 53.0 % Final     MCV 01/19/2021 85  78 - 100 fl Final     MCH 01/19/2021 27.2  26.5 - 33.0 pg Final     MCHC 01/19/2021 31.8  31.5 - 36.5 g/dL Final     RDW 01/19/2021 13.4  10.0 - 15.0 % Final     Platelet Count 01/19/2021 303  150 - 450 10e9/L Final     ALT 01/19/2021 22  0 - 70 U/L Final     Cholesterol 01/19/2021 128  <200 mg/dL Final     Triglycerides 01/19/2021 191* <150 mg/dL Final    Comment: Borderline high:  150-199 mg/dl  High:             200-499 mg/dl  Very high:       >499 mg/dl  Fasting specimen       HDL Cholesterol 01/19/2021 37* >39 mg/dL Final     LDL Cholesterol Calculated 01/19/2021 53  <100 mg/dL Final    Desirable:       <100 mg/dl     Non HDL Cholesterol 01/19/2021 91  <130 mg/dL Final     Sodium 01/19/2021 140  133 - 144 mmol/L Final     Potassium 01/19/2021 3.2* 3.4 - 5.3 mmol/L Final     Chloride 01/19/2021 105  94 - 109 mmol/L Final     Carbon Dioxide 01/19/2021 28  20 - 32 mmol/L Final     Anion Gap 01/19/2021 7  3 - 14 mmol/L Final     Glucose 01/19/2021 135* 70 - 99 mg/dL Final    Fasting specimen     Urea  Nitrogen 01/19/2021 17  7 - 30 mg/dL Final     Creatinine 01/19/2021 1.01  0.66 - 1.25 mg/dL Final     GFR Estimate 01/19/2021 79  >60 mL/min/[1.73_m2] Final    Comment: Non  GFR Calc  Starting 12/18/2018, serum creatinine based estimated GFR (eGFR) will be   calculated using the Chronic Kidney Disease Epidemiology Collaboration   (CKD-EPI) equation.       GFR Estimate If Black 01/19/2021 >90  >60 mL/min/[1.73_m2] Final    Comment:  GFR Calc  Starting 12/18/2018, serum creatinine based estimated GFR (eGFR) will be   calculated using the Chronic Kidney Disease Epidemiology Collaboration   (CKD-EPI) equation.       Calcium 01/19/2021 8.9  8.5 - 10.1 mg/dL Final

## 2021-02-12 ENCOUNTER — TELEPHONE (OUTPATIENT)
Dept: FAMILY MEDICINE | Facility: CLINIC | Age: 61
End: 2021-02-12

## 2021-02-12 LAB
ALDOST SERPL-MCNC: 40.4 NG/DL (ref 0–31)
ANION GAP SERPL CALCULATED.3IONS-SCNC: 7 MMOL/L (ref 3–14)
BUN SERPL-MCNC: 17 MG/DL (ref 7–30)
CALCIUM SERPL-MCNC: 9.8 MG/DL (ref 8.5–10.1)
CHLORIDE SERPL-SCNC: 107 MMOL/L (ref 94–109)
CO2 SERPL-SCNC: 25 MMOL/L (ref 20–32)
CREAT SERPL-MCNC: 0.95 MG/DL (ref 0.66–1.25)
GFR SERPL CREATININE-BSD FRML MDRD: 85 ML/MIN/{1.73_M2}
GLUCOSE SERPL-MCNC: 124 MG/DL (ref 70–99)
POTASSIUM SERPL-SCNC: 4 MMOL/L (ref 3.4–5.3)
SODIUM SERPL-SCNC: 139 MMOL/L (ref 133–144)

## 2021-02-12 NOTE — RESULT ENCOUNTER NOTE
Please notify patient of the following lab results  Radu, reviewed your labs and EKG.  EKG shows slightly prolonged QT measurement on the EKG which reflects the timing of relaxation of the heart muscles during diastole and your QTc was 475 msec corrected [normal should be less than 450].  Sertraline may cause prolongation of QT  will need to closely monitor QT on ECG,  I would recommend weaning slightly the dose of the sertraline; such as decreasing your sertraline dose down to 25 mg ; as sertraline is known to cause QT prolongation [or we could continue to closely monitor the QT on future EKGs]  Thyroid test called TSH is normal  PSA which is prostate-specific antigen is  at 0.16, 3 months ago was 0.17  Rest of the labs are pending  Dr. Barlow

## 2021-02-12 NOTE — TELEPHONE ENCOUNTER
[]Hide copied text    []Hover for details     Please notify patient of the following lab results  Radu, reviewed your labs and EKG.  EKG shows slightly prolonged QT measurement on the EKG which reflects the timing of relaxation of the heart muscles during diastole and your QTc was 475 msec corrected [normal should be less than 450].  Sertraline may cause prolongation of QT  will need to closely monitor QT on ECG,  I would recommend weaning slightly the dose of the sertraline; such as decreasing your sertraline dose down to 25 mg ; as sertraline is known to cause QT prolongation [or we could continue to closely monitor the QT on future EKGs]  Thyroid test called TSH is normal  PSA which is prostate-specific antigen is  at 0.16, 3 months ago was 0.17  Rest of the labs are pending  Dr. Barlow

## 2021-02-12 NOTE — TELEPHONE ENCOUNTER
Patient called with questions regarding this EKG report and advise.     Patient confirms currently taking Sertraline 50mg daily.   Willing to decrease to 25 mg daily, if that is the recommendation from Dr Barlow.   Please confirm.    Also patient has started to wean down the Buspar.   Had been taking 10 mg TID.    Decreased to 10 mg BID x 2 days.     Please confirm regarding Sertraline along with any other advise.     [phone]  May leave detailed msg/orders on voice mail.

## 2021-02-13 DIAGNOSIS — I10 HYPERTENSION, UNSPECIFIED TYPE: Primary | ICD-10-CM

## 2021-02-13 NOTE — TELEPHONE ENCOUNTER
Yes I recommend weaning dose of sertraline down to 25 mg as is only medication on patient med list that he is taking that can cause such finding on EKG, And will repeat EKG with next visit to make sure the QTc has decreased back to normal (~450 msec), QTc on EKG was 475 msec slightly prolonged as mentioned, we can discuss if he needs other medication as alternative to weaning sertraline if needed;. he may wean one medicine at a time, not both buspirone and sertraline, hope this clarifies  Any further questions I am happy to address.

## 2021-02-14 LAB — RENIN PLAS-CCNC: <0.1 NG/ML/HR

## 2021-02-15 ENCOUNTER — TELEPHONE (OUTPATIENT)
Dept: FAMILY MEDICINE | Facility: CLINIC | Age: 61
End: 2021-02-15

## 2021-02-15 LAB — ALDOSTERONE RENIN RATIO: NORMAL (ref 0–25)

## 2021-02-15 NOTE — TELEPHONE ENCOUNTER
Mickie Medeiros RN   2/12/2021  9:12 AM CST      Left message on machine to call back  Mickie Medeiros RN on 2/12/2021 at 9:12 AM       Emma Barlow MD   2/11/2021 11:10 PM CST         Please notify patient of the following lab results  Radu, reviewed your labs and EKG.  EKG shows slightly prolonged QT measurement on the EKG which reflects the timing of relaxation of the heart muscles during diastole and your QTc was 475 msec corrected [normal should be less than 450].  Sertraline may cause prolongation of QT  will need to closely monitor QT on ECG,  I would recommend weaning slightly the dose of the sertraline; such as decreasing your sertraline dose down to 25 mg ; as sertraline is known to cause QT prolongation [or we could continue to closely monitor the QT on future EKGs]  Thyroid test called TSH is normal  PSA which is prostate-specific antigen is  at 0.16, 3 months ago was 0.17  Rest of the labs are pending  Dr. Yen MONTERO PCP:     Discussed with patient      Sertraline 50mg daily - will drop down to 25mg daily and let us know if he has any problems/side effects     Buspirone - pt states he cut down from 10mg x3 down to 2 tablets this week and then 1 for a week then off     Pt was asking about cause of abnormal aldosterone level - history of prostate cancer/radiation and chemo treatment - will discuss with nephrology. Number given to schedule    Inge ORTEZ RN

## 2021-02-15 NOTE — TELEPHONE ENCOUNTER
RECORDS RECEIVED FROM: Internal   DATE RECEIVED: 04.30.2021   NOTES STATUS DETAILS   OFFICE NOTE from referring provider Internal 02.13.2021 Emma Barlow MD   OFFICE NOTE from other specialist  Care Everywhere 08.28.2021 Manjit Hugo M.D.     *Only VASCULITIS or LUPUS gather office notes for the following N/A    *PULMONARY   N/A    *ENT N/A    *DERMATOLOGY N/A    *RHEUMATOLOGY N/A    DISCHARGE SUMMARY from hospital N/A    DISCHARGE REPORT from the ER N/A    MEDICATION LIST Internal / CE    IMAGING  (NEED IMAGES AND REPORTS)     KIDNEY CT SCAN N/A    KIDNEY ULTRASOUND N/A    MR ABDOMEN N/A    NUCLEAR MEDICINE RENAL N/A    LABS     CBC Internal 01.19.2021   CMP Internal 01.019.2021   BMP Internal 02.10.2021   UA N/A    URINE PROTEIN N/A    RENAL PANEL N/A    BIOPSY     KIDNEY BIOPSY  N/A

## 2021-02-15 NOTE — TELEPHONE ENCOUNTER
Reviewed all with pt- also a duplicate encounter.  Will call back if needing anythign else.  Will take the sertraline 25mg daily and buspar 20mg daily as already reduced this.    Elida Rolon, RN  [facility] RN Triage Team

## 2021-03-02 ENCOUNTER — MYC MEDICAL ADVICE (OUTPATIENT)
Dept: CARDIOLOGY | Facility: CLINIC | Age: 61
End: 2021-03-02

## 2021-03-09 ENCOUNTER — CARE COORDINATION (OUTPATIENT)
Dept: CARDIOLOGY | Facility: CLINIC | Age: 61
End: 2021-03-09

## 2021-03-09 NOTE — PROGRESS NOTES
Call out to patient to review his [employer] message further. Patient states he he typically has superficial pins and needles sensations from the surgical incision. However, patient states he worked out yesterday and then woke up this morning with a sore left chest. He said his wife thinks his left chest area appears slightly swollen. Patient also reports he had a family birthday party over the weekend and picked up his grandkids. He also went to the driving range and hit about 60 balls the other day. He reports no chest pain/pressure/arm/neck/jaw pain or shortness of breath with these activities. I told patient it sounds like this may be musculoskeletal from overuse, but to keep an eye on things and update us if his symptoms don't go away. Update routed to Dr. Mendes. Amber Morris RN on 3/9/2021 at 2:54 PM      BP today 133/70, HR 65.     Radu Petty Kairav, MD 1 hour ago (1:36 PM)        Good afternoon.   I was just curious,   I have been having pains across my chest lately.   I am not sure if it is the healing of the surgery or my heart.   I dont think it is my heart, it doesnt feel that way.  It feels thomas swollen on my left side of the chest.   Its like Pins sticking me.  It has not slowed me down, but I thought I better check it out.          Thank you for your help.

## 2021-03-09 NOTE — TELEPHONE ENCOUNTER
If it is non exertional, sounds non anginal to me. If persists or worsens, please have him see [facility]

## 2021-03-10 NOTE — PROGRESS NOTES
Andrea Mendes MD  You 20 hours ago (4:21 PM)        If it is non exertional, sounds non anginal to me. If persists or worsens, please have him see PALMA         Documentation

## 2021-03-26 DIAGNOSIS — Z12.11 COLON CANCER SCREENING: ICD-10-CM

## 2021-03-26 DIAGNOSIS — Z95.1 S/P CABG (CORONARY ARTERY BYPASS GRAFT): ICD-10-CM

## 2021-03-26 PROCEDURE — 82274 ASSAY TEST FOR BLOOD FECAL: CPT | Performed by: INTERNAL MEDICINE

## 2021-03-26 RX ORDER — LOSARTAN POTASSIUM 100 MG/1
100 TABLET ORAL DAILY
Qty: 90 TABLET | Refills: 1 | Status: SHIPPED | OUTPATIENT
Start: 2021-03-26 | End: 2021-09-23

## 2021-03-27 DIAGNOSIS — E11.9 TYPE 2 DIABETES MELLITUS WITHOUT COMPLICATION, WITHOUT LONG-TERM CURRENT USE OF INSULIN (H): ICD-10-CM

## 2021-03-29 RX ORDER — GLIPIZIDE 2.5 MG/1
2.5 TABLET, EXTENDED RELEASE ORAL DAILY
Qty: 90 TABLET | Refills: 2 | Status: SHIPPED | OUTPATIENT
Start: 2021-03-29 | End: 2021-07-14

## 2021-04-01 LAB — HEMOCCULT STL QL IA: NEGATIVE

## 2021-04-21 LAB — RENIN PLAS-CCNC: <0.1 NG/ML/HR

## 2021-04-28 ENCOUNTER — TELEPHONE (OUTPATIENT)
Dept: NEPHROLOGY | Facility: CLINIC | Age: 61
End: 2021-04-28

## 2021-04-28 DIAGNOSIS — I12.9 HYPERTENSION, RENAL: Primary | ICD-10-CM

## 2021-04-29 DIAGNOSIS — I12.9 HYPERTENSION, RENAL: ICD-10-CM

## 2021-04-29 LAB
ALBUMIN SERPL-MCNC: 4 G/DL (ref 3.4–5)
ALBUMIN UR-MCNC: ABNORMAL MG/DL
ANION GAP SERPL CALCULATED.3IONS-SCNC: <1 MMOL/L (ref 3–14)
APPEARANCE UR: CLEAR
BILIRUB UR QL STRIP: NEGATIVE
BUN SERPL-MCNC: 15 MG/DL (ref 7–30)
CALCIUM SERPL-MCNC: 9.3 MG/DL (ref 8.5–10.1)
CHLORIDE SERPL-SCNC: 107 MMOL/L (ref 94–109)
CO2 SERPL-SCNC: 33 MMOL/L (ref 20–32)
COLOR UR AUTO: YELLOW
CREAT SERPL-MCNC: 1 MG/DL (ref 0.66–1.25)
ERYTHROCYTE [DISTWIDTH] IN BLOOD BY AUTOMATED COUNT: 14.8 % (ref 10–15)
GFR SERPL CREATININE-BSD FRML MDRD: 80 ML/MIN/{1.73_M2}
GLUCOSE SERPL-MCNC: 98 MG/DL (ref 70–99)
GLUCOSE UR STRIP-MCNC: NEGATIVE MG/DL
HCT VFR BLD AUTO: 43.8 % (ref 40–53)
HGB BLD-MCNC: 14.1 G/DL (ref 13.3–17.7)
HGB UR QL STRIP: NEGATIVE
KETONES UR STRIP-MCNC: NEGATIVE MG/DL
LEUKOCYTE ESTERASE UR QL STRIP: NEGATIVE
MCH RBC QN AUTO: 27.2 PG (ref 26.5–33)
MCHC RBC AUTO-ENTMCNC: 32.2 G/DL (ref 31.5–36.5)
MCV RBC AUTO: 84 FL (ref 78–100)
MUCOUS THREADS #/AREA URNS LPF: PRESENT /LPF
NITRATE UR QL: NEGATIVE
NON-SQ EPI CELLS #/AREA URNS LPF: ABNORMAL /LPF
PH UR STRIP: 7 PH (ref 5–7)
PHOSPHATE SERPL-MCNC: 2.3 MG/DL (ref 2.5–4.5)
PLATELET # BLD AUTO: 250 10E9/L (ref 150–450)
POTASSIUM SERPL-SCNC: 4.1 MMOL/L (ref 3.4–5.3)
PROT UR-MCNC: 0.28 G/L
PROT/CREAT 24H UR: 0.17 G/G CR (ref 0–0.2)
RBC # BLD AUTO: 5.19 10E12/L (ref 4.4–5.9)
RBC #/AREA URNS AUTO: ABNORMAL /HPF
SODIUM SERPL-SCNC: 139 MMOL/L (ref 133–144)
SOURCE: ABNORMAL
SP GR UR STRIP: 1.01 (ref 1–1.03)
UROBILINOGEN UR STRIP-ACNC: 0.2 EU/DL (ref 0.2–1)
WBC # BLD AUTO: 6.2 10E9/L (ref 4–11)
WBC #/AREA URNS AUTO: ABNORMAL /HPF

## 2021-04-29 PROCEDURE — 36415 COLL VENOUS BLD VENIPUNCTURE: CPT | Performed by: STUDENT IN AN ORGANIZED HEALTH CARE EDUCATION/TRAINING PROGRAM

## 2021-04-29 PROCEDURE — 84156 ASSAY OF PROTEIN URINE: CPT | Performed by: STUDENT IN AN ORGANIZED HEALTH CARE EDUCATION/TRAINING PROGRAM

## 2021-04-29 PROCEDURE — 80069 RENAL FUNCTION PANEL: CPT | Performed by: STUDENT IN AN ORGANIZED HEALTH CARE EDUCATION/TRAINING PROGRAM

## 2021-04-29 PROCEDURE — 81001 URINALYSIS AUTO W/SCOPE: CPT | Performed by: STUDENT IN AN ORGANIZED HEALTH CARE EDUCATION/TRAINING PROGRAM

## 2021-04-29 PROCEDURE — 85027 COMPLETE CBC AUTOMATED: CPT | Performed by: STUDENT IN AN ORGANIZED HEALTH CARE EDUCATION/TRAINING PROGRAM

## 2021-04-30 ENCOUNTER — PRE VISIT (OUTPATIENT)
Dept: NEPHROLOGY | Facility: CLINIC | Age: 61
End: 2021-04-30

## 2021-04-30 ENCOUNTER — VIRTUAL VISIT (OUTPATIENT)
Dept: NEPHROLOGY | Facility: CLINIC | Age: 61
End: 2021-04-30
Attending: INTERNAL MEDICINE
Payer: COMMERCIAL

## 2021-04-30 DIAGNOSIS — I12.9 HYPERTENSION, RENAL: ICD-10-CM

## 2021-04-30 DIAGNOSIS — I25.810 CORONARY ARTERY DISEASE INVOLVING CORONARY BYPASS GRAFT OF NATIVE HEART, ANGINA PRESENCE UNSPECIFIED: ICD-10-CM

## 2021-04-30 DIAGNOSIS — E26.9 HYPERALDOSTERONISM (H): Primary | ICD-10-CM

## 2021-04-30 DIAGNOSIS — I10 HYPERTENSION, UNSPECIFIED TYPE: ICD-10-CM

## 2021-04-30 DIAGNOSIS — E87.6 HYPOKALEMIA: Primary | ICD-10-CM

## 2021-04-30 DIAGNOSIS — E11.69 TYPE 2 DIABETES MELLITUS WITH OTHER SPECIFIED COMPLICATION, WITHOUT LONG-TERM CURRENT USE OF INSULIN (H): ICD-10-CM

## 2021-04-30 PROCEDURE — 99204 OFFICE O/P NEW MOD 45 MIN: CPT | Mod: 95 | Performed by: STUDENT IN AN ORGANIZED HEALTH CARE EDUCATION/TRAINING PROGRAM

## 2021-04-30 NOTE — LETTER
4/30/2021       RE: Adrian Petty  [address]  [facility] [address]     Dear Colleague,    Thank you for referring your patient, Adrian Petty, to the [facility] [place] at [facility]. Please see a copy of my visit note below.    Radu is a 61 year old who is being evaluated via a billable video visit.      How would you like to obtain your AVS? MyChart  If the video visit is dropped, the invitation should be resent by: Text to cell phone: [phone]  Will anyone else be joining your video visit? No        Video-Visit Details    Type of service:  Video Visit    Video Start Time: 8:08am    Video End Time:8:55am    Originating Location (pt. Location): Home    Distant Location (provider location):  [facility] [place]     Platform used for Video Visit: Hortencia Haji MD  Renal     Nephrology Clinic    CC: elevated aldosterone level     HPI:  Adrian Petty is a 61 year old male with pmh of prostate cancer, HTN, and CAD s/p CABG 11/2020 who was found to have aldosterone of 40 and renin activity of <0.01 twice in recent months and was referred by Dr. Barlow for these lab findings.     The patient reports that he has had HTN for 10+ years, but BP control has been much worse for last 3 years. This elevated BP came to the attention of cardiology in 11/2020 when the patient was evaluated for exertional CP, found to need CABG on angiogram, and admitted for surgery. Cardiology note, and today the patient confirms, that his BP was indeed much worse leading up to that admission according to OSH records than it has been historically. They thusly obtained an aruna and renin (despite the fact that his admission meds included triamterene-hydrochlorothiazide), with results 37.3 / < 0.01. He also had repeatedly observed hypokalemia around that time. The patient was taken off  "triamterene-hydrochlorothiazide at discharge and has not been on aldosterone antagonists since.     He had repeat aruna / renin activity with his PCP in 2/2021 with results of 40.4 / < 0.01. He has not had an abdominal CT scan.     TTE shows 60-65% with Grade I or early diastolic dysfunction.     Did not tolerated angiogram through groin he tells me due to back pain and feelings of \"restless legs.\"     A1c in 2016 reached 6.7% but the patient states he didn't know he had DM2 until more recently - at the time of his CABG 11/2020 he started metformin.     No CP, SOB, headaches, abd pain, N/V/D, or fevers. He reports mild LLE swelling (the side of vein harvest) and mild vision changes that are not acutely changing.       FHx: father had CAD and CABG but no known family members with HTN      Past Medical History:   Diagnosis Date     Arthritis      CAD (coronary artery disease) 11/05/2020    3 vessel     Cancer (H) 2010    prostate     DM2 (diabetes mellitus, type 2) (H)     A1C was 8.0 on 11/4/20     Heart disease 2012    stents     HLD (hyperlipidemia)      Hypertension      NSTEMI (non-ST elevated myocardial infarction) (H) 11/05/2020     S/P CABG x 3 11/09/2020    LIMA to LAD, SVG to PDA, SVG to diag       Past Surgical History:   Procedure Laterality Date     BACK SURGERY      decompression     BYPASS GRAFT ARTERY CORONARY N/A 11/9/2020    Procedure: CORONARY ARTERY BYPASS GRAFT X 3 (LIMA-LAD, SV-DIAG, SV-PDA), ON PUMP WITH SHAYY READ BY ANESTHESIOLOGIST DR RANGEL.;  Surgeon: Leonardo Herrera MD;  Location:  OR     COLONOSCOPY       CV HEART CATHETERIZATION WITH POSSIBLE INTERVENTION N/A 11/5/2020    Procedure: Heart Catheterization with Possible Intervention with MYRON - Schedule at 10:30 AM on Thursday 11/5;  Surgeon: Ted Haider MD;  Location:  HEART CARDIAC CATH LAB     DAVINCI PROSTATECTOMY       GENITOURINARY SURGERY      penile implant     ORTHOPEDIC SURGERY          Family History "   Problem Relation Age of Onset     Coronary Artery Disease Father      Diabetes Father      Diabetes Mother      Breast Cancer Mother        Social History     Tobacco Use     Smoking status: Never Smoker     Smokeless tobacco: Former User   Substance Use Topics     Alcohol use: Yes     Comment: 1 beer per week     Drug use: Not Currently   Never smoker. EtOH is typically 1-2 beers/week.     Medications:  Current Outpatient Medications   Medication Sig Dispense Refill     acetaminophen (TYLENOL) 325 MG tablet Take 2 tablets (650 mg) by mouth every 4 hours as needed for other (multimodal surgical pain management along with NSAIDS and opioid medication as indicated based on pain control and physical function.)       Alcohol Swabs PADS Use to swab the area of the injection or anamika as directed   Per insurance coverage 100 each 0     amLODIPine (NORVASC) 10 MG tablet Take 1 tablet (10 mg) by mouth daily 90 tablet 3     aspirin (ASA) 81 MG EC tablet Take 1 tablet (81 mg) by mouth daily       blood glucose (NO BRAND SPECIFIED) lancets standard To use to test glucose level in the blood  Use to test blood sugar  2  times daily as directed. To accompany glucose monitor brands per insurance coverage. 100 each 0     blood glucose (NO BRAND SPECIFIED) test strip To use to test glucose level in the blood Use to test blood sugar  2 times daily as directed. To accompany glucose monitor brands per insurance coverage. 100 strip 6     blood glucose calibration (NO BRAND SPECIFIED) solution Used to calibrate the blood glucose monitor as needed and as directed.  To accompany  blood glucose brands per insurance coverage 1 Bottle 0     blood glucose monitoring (NO BRAND SPECIFIED) meter device kit Use as directed   Per insurance coverage 1 kit 0     glipiZIDE (GLUCOTROL XL) 2.5 MG 24 hr tablet Take 1 tablet (2.5 mg) by mouth daily 90 tablet 2     losartan (COZAAR) 100 MG tablet Take 1 tablet (100 mg) by mouth daily 90 tablet 1      metFORMIN (GLUCOPHAGE-XR) 500 MG 24 hr tablet Take 1 tablet (500 mg) by mouth 2 times daily (with meals) (Patient taking differently: Take 500 mg by mouth daily (with dinner) ) 180 tablet 1     metoprolol tartrate (LOPRESSOR) 100 MG tablet Take 1 tablet (100 mg) by mouth 2 times daily 160 tablet 3     nitroGLYcerin (NITROSTAT) 0.3 MG sublingual tablet For chest pain place 1 tablet under the tongue every 5 minutes for 3 doses. If symptoms persist 5 minutes after 1st dose call 911. 30 tablet 3     potassium chloride ER (KLOR-CON M) 10 MEQ CR tablet Take 2 tablets (20 mEq) by mouth daily 10 tablet 0     rosuvastatin (CRESTOR) 40 MG tablet Take 1 tablet (40 mg) by mouth daily 90 tablet 3     sertraline (ZOLOFT) 50 MG tablet Take 50 mg by mouth daily         Review Of Systems:  10 point ROS otherwise negative.     OBJECTIVE:  Vital signs and Physical exam deferred due to video visit.     BMP/renal panel, CBC, random urine protein, UA with micro, aruna level and renin activity reviewed.       Recent Labs   Lab Test 04/29/21  1126 02/10/21  0943    139   POTASSIUM 4.1 4.0   CHLORIDE 107 107   CO2 33* 25   ANIONGAP <1* 7   GLC 98 124*   BUN 15 17   CR 1.00 0.95   NADEEN 9.3 9.8       Lab Results   Component Value Date    WBC 6.2 04/29/2021     Lab Results   Component Value Date    RBC 5.19 04/29/2021     Lab Results   Component Value Date    HGB 14.1 04/29/2021     Lab Results   Component Value Date    HCT 43.8 04/29/2021     No components found for: MCT  Lab Results   Component Value Date    MCV 84 04/29/2021     Lab Results   Component Value Date    MCH 27.2 04/29/2021     Lab Results   Component Value Date    MCHC 32.2 04/29/2021     Lab Results   Component Value Date    RDW 14.8 04/29/2021     Lab Results   Component Value Date     04/29/2021       Color Urine (no units)   Date Value   04/29/2021 Yellow     Appearance Urine (no units)   Date Value   04/29/2021 Clear     Glucose Urine (mg/dL)   Date Value    04/29/2021 Negative     Bilirubin Urine (no units)   Date Value   04/29/2021 Negative     Ketones Urine (mg/dL)   Date Value   04/29/2021 Negative     Specific Gravity Urine (no units)   Date Value   04/29/2021 1.010     pH Urine (pH)   Date Value   04/29/2021 7.0     Protein Albumin Urine (mg/dL)   Date Value   04/29/2021 Trace (A)     Urobilinogen Urine (EU/dL)   Date Value   04/29/2021 0.2     Nitrite Urine (no units)   Date Value   04/29/2021 Negative     Leukocyte Esterase Urine (no units)   Date Value   04/29/2021 Negative             ASSESSMENT/PLAN:  Pt is a 61 year old male here to establish care.    Adrian was seen today for new patient.    Diagnoses and all orders for this visit:    Hypertension, unspecified type   Strongly suspecting the patient has secondary HTN (or secondary HTN on top of previous benign essential HTN). His aruna/renin level is remarkable positive, though we will proceed with saline loading (in our infusion center, to be arrange by our nurses) to ensure that even at these low renin levels, renin is not driving his high aldosterone. If positive, CT scan will follow and potential adrenal vein sampling (AVS) with IR, all of which was discussed with the patient.    What complicates this case is the fact that with CAD (s/p stents and later CABG), the patient is likely going to need to continue several of his antihypertensives that double as cardiac medications (beta blocker, potentially ARB despite no systolic HF, amlodipine may be stoppable but also has potential cardiac benefits) even if his HTN were to be cured by surgical removal of an unilateral adrenal adenoma (which is not the only potential cause of hyperaldosteronism, should we prove that on saline suppression testing). He may also have improved but not cured HTN after such a surgery (though reassuringly, one potential cause of such residual HTN being CKD is not clearly present in this case based on creatinine and UPCR). Finally  complicating this case is the patient's prior difficulty with angiogram (we will discuss options, if any, with IR).   The potential benefits, which were discussed with the patient along with the above complicating factors, were cured vs improved HTN should we find our surgically addressable source of aldosterone, which has been shown in observational studies to improve quality of life. He would also be able to stop potassium supplementation most likely (along with potential simplification of his BP meds). If a surgically addressable cause is not found, we will then almost certainly pursue aldosterone antagonism (e.g. spironolactone) to treat HTN, treat hyperkalemia, and most importantly, to attempt to reduce/block the cardiac effects of prolonged aldosterone exposure.    Not starting aldosterone antagonism now as it would interfere with the above tests. Reassuringly, his BP is currently controlled without this med class.     Hypokalemia  Treated on oral potassium supplementation.    Elevated serum bicarb  Newly to 33, previously 25-28. Unclear if from hyperaldo since new. Will CTM on subsequent labs.     GFR  Preserved.     Type 2 diabetes mellitus with other specified complication, without long-term current use of insulin (H)  No proteinuria. On ARB. On metformin.     Coronary artery disease involving coronary bypass graft of native heart, angina presence unspecified  See implication of hyperaldosteronism above.       Return to clinic in 3 months.     Omer Haji MD  Instructor  Nephrology  Pager [phone]           Declines

## 2021-04-30 NOTE — PROGRESS NOTES
Radu is a 61 year old who is being evaluated via a billable video visit.      How would you like to obtain your AVS? MyChart  If the video visit is dropped, the invitation should be resent by: Text to cell phone: [phone]  Will anyone else be joining your video visit? No        Video-Visit Details    Type of service:  Video Visit    Video Start Time: 8:08am    Video End Time:8:55am    Originating Location (pt. Location): Home    Distant Location (provider location):  [facility] [place]     Platform used for Video Visit: Hortencia Haji MD  Renal

## 2021-04-30 NOTE — PROGRESS NOTES
"Nephrology Clinic    CC: elevated aldosterone level     HPI:  Adrian Petty is a 61 year old male with pmh of prostate cancer, HTN, and CAD s/p CABG 11/2020 who was found to have aldosterone of 40 and renin activity of <0.01 twice in recent months and was referred by Dr. Barlow for these lab findings.     The patient reports that he has had HTN for 10+ years, but BP control has been much worse for last 3 years. This elevated BP came to the attention of cardiology in 11/2020 when the patient was evaluated for exertional CP, found to need CABG on angiogram, and admitted for surgery. Cardiology note, and today the patient confirms, that his BP was indeed much worse leading up to that admission according to OSH records than it has been historically. They thusly obtained an aruna and renin (despite the fact that his admission meds included triamterene-hydrochlorothiazide), with results 37.3 / < 0.01. He also had repeatedly observed hypokalemia around that time. The patient was taken off triamterene-hydrochlorothiazide at discharge and has not been on aldosterone antagonists since.     He had repeat aruna / renin activity with his PCP in 2/2021 with results of 40.4 / < 0.01. He has not had an abdominal CT scan.     TTE shows 60-65% with Grade I or early diastolic dysfunction.     Did not tolerated angiogram through groin he tells me due to back pain and feelings of \"restless legs.\"     A1c in 2016 reached 6.7% but the patient states he didn't know he had DM2 until more recently - at the time of his CABG 11/2020 he started metformin.     No CP, SOB, headaches, abd pain, N/V/D, or fevers. He reports mild LLE swelling (the side of vein harvest) and mild vision changes that are not acutely changing.       FHx: father had CAD and CABG but no known family members with HTN      Past Medical History:   Diagnosis Date     Arthritis      CAD (coronary artery disease) 11/05/2020    3 vessel     Cancer (H) 2010    prostate     " DM2 (diabetes mellitus, type 2) (H)     A1C was 8.0 on 11/4/20     Heart disease 2012    stents     HLD (hyperlipidemia)      Hypertension      NSTEMI (non-ST elevated myocardial infarction) (H) 11/05/2020     S/P CABG x 3 11/09/2020    LIMA to LAD, SVG to PDA, SVG to diag       Past Surgical History:   Procedure Laterality Date     BACK SURGERY      decompression     BYPASS GRAFT ARTERY CORONARY N/A 11/9/2020    Procedure: CORONARY ARTERY BYPASS GRAFT X 3 (LIMA-LAD, SV-DIAG, SV-PDA), ON PUMP WITH SHAYY READ BY ANESTHESIOLOGIST DR RANGEL.;  Surgeon: Leonardo Herrera MD;  Location:  OR     COLONOSCOPY       CV HEART CATHETERIZATION WITH POSSIBLE INTERVENTION N/A 11/5/2020    Procedure: Heart Catheterization with Possible Intervention with KNAPPER - Schedule at 10:30 AM on Thursday 11/5;  Surgeon: Ted Haider MD;  Location:  HEART CARDIAC CATH LAB     DAVINCI PROSTATECTOMY       GENITOURINARY SURGERY      penile implant     ORTHOPEDIC SURGERY          Family History   Problem Relation Age of Onset     Coronary Artery Disease Father      Diabetes Father      Diabetes Mother      Breast Cancer Mother        Social History     Tobacco Use     Smoking status: Never Smoker     Smokeless tobacco: Former User   Substance Use Topics     Alcohol use: Yes     Comment: 1 beer per week     Drug use: Not Currently   Never smoker. EtOH is typically 1-2 beers/week.     Medications:  Current Outpatient Medications   Medication Sig Dispense Refill     acetaminophen (TYLENOL) 325 MG tablet Take 2 tablets (650 mg) by mouth every 4 hours as needed for other (multimodal surgical pain management along with NSAIDS and opioid medication as indicated based on pain control and physical function.)       Alcohol Swabs PADS Use to swab the area of the injection or anamika as directed   Per insurance coverage 100 each 0     amLODIPine (NORVASC) 10 MG tablet Take 1 tablet (10 mg) by mouth daily 90 tablet 3     aspirin (ASA) 81  MG EC tablet Take 1 tablet (81 mg) by mouth daily       blood glucose (NO BRAND SPECIFIED) lancets standard To use to test glucose level in the blood  Use to test blood sugar  2  times daily as directed. To accompany glucose monitor brands per insurance coverage. 100 each 0     blood glucose (NO BRAND SPECIFIED) test strip To use to test glucose level in the blood Use to test blood sugar  2 times daily as directed. To accompany glucose monitor brands per insurance coverage. 100 strip 6     blood glucose calibration (NO BRAND SPECIFIED) solution Used to calibrate the blood glucose monitor as needed and as directed.  To accompany  blood glucose brands per insurance coverage 1 Bottle 0     blood glucose monitoring (NO BRAND SPECIFIED) meter device kit Use as directed   Per insurance coverage 1 kit 0     glipiZIDE (GLUCOTROL XL) 2.5 MG 24 hr tablet Take 1 tablet (2.5 mg) by mouth daily 90 tablet 2     losartan (COZAAR) 100 MG tablet Take 1 tablet (100 mg) by mouth daily 90 tablet 1     metFORMIN (GLUCOPHAGE-XR) 500 MG 24 hr tablet Take 1 tablet (500 mg) by mouth 2 times daily (with meals) (Patient taking differently: Take 500 mg by mouth daily (with dinner) ) 180 tablet 1     metoprolol tartrate (LOPRESSOR) 100 MG tablet Take 1 tablet (100 mg) by mouth 2 times daily 160 tablet 3     nitroGLYcerin (NITROSTAT) 0.3 MG sublingual tablet For chest pain place 1 tablet under the tongue every 5 minutes for 3 doses. If symptoms persist 5 minutes after 1st dose call 911. 30 tablet 3     potassium chloride ER (KLOR-CON M) 10 MEQ CR tablet Take 2 tablets (20 mEq) by mouth daily 10 tablet 0     rosuvastatin (CRESTOR) 40 MG tablet Take 1 tablet (40 mg) by mouth daily 90 tablet 3     sertraline (ZOLOFT) 50 MG tablet Take 50 mg by mouth daily         Review Of Systems:  10 point ROS otherwise negative.     OBJECTIVE:  Vital signs and Physical exam deferred due to video visit.     BMP/renal panel, CBC, random urine protein, UA with  micro, aruna level and renin activity reviewed.       Recent Labs   Lab Test 04/29/21  1126 02/10/21  0943    139   POTASSIUM 4.1 4.0   CHLORIDE 107 107   CO2 33* 25   ANIONGAP <1* 7   GLC 98 124*   BUN 15 17   CR 1.00 0.95   NADEEN 9.3 9.8       Lab Results   Component Value Date    WBC 6.2 04/29/2021     Lab Results   Component Value Date    RBC 5.19 04/29/2021     Lab Results   Component Value Date    HGB 14.1 04/29/2021     Lab Results   Component Value Date    HCT 43.8 04/29/2021     No components found for: MCT  Lab Results   Component Value Date    MCV 84 04/29/2021     Lab Results   Component Value Date    MCH 27.2 04/29/2021     Lab Results   Component Value Date    MCHC 32.2 04/29/2021     Lab Results   Component Value Date    RDW 14.8 04/29/2021     Lab Results   Component Value Date     04/29/2021       Color Urine (no units)   Date Value   04/29/2021 Yellow     Appearance Urine (no units)   Date Value   04/29/2021 Clear     Glucose Urine (mg/dL)   Date Value   04/29/2021 Negative     Bilirubin Urine (no units)   Date Value   04/29/2021 Negative     Ketones Urine (mg/dL)   Date Value   04/29/2021 Negative     Specific Gravity Urine (no units)   Date Value   04/29/2021 1.010     pH Urine (pH)   Date Value   04/29/2021 7.0     Protein Albumin Urine (mg/dL)   Date Value   04/29/2021 Trace (A)     Urobilinogen Urine (EU/dL)   Date Value   04/29/2021 0.2     Nitrite Urine (no units)   Date Value   04/29/2021 Negative     Leukocyte Esterase Urine (no units)   Date Value   04/29/2021 Negative             ASSESSMENT/PLAN:  Pt is a 61 year old male here to establish care.    Adrian was seen today for new patient.    Diagnoses and all orders for this visit:    Hypertension, unspecified type   Strongly suspecting the patient has secondary HTN (or secondary HTN on top of previous benign essential HTN). His aruna/renin level is remarkable positive, though we will proceed with saline loading (in our infusion  center, to be arrange by our nurses) to ensure that even at these low renin levels, renin is not driving his high aldosterone. If positive, CT scan will follow and potential adrenal vein sampling (AVS) with IR, all of which was discussed with the patient.    What complicates this case is the fact that with CAD (s/p stents and later CABG), the patient is likely going to need to continue several of his antihypertensives that double as cardiac medications (beta blocker, potentially ARB despite no systolic HF, amlodipine may be stoppable but also has potential cardiac benefits) even if his HTN were to be cured by surgical removal of an unilateral adrenal adenoma (which is not the only potential cause of hyperaldosteronism, should we prove that on saline suppression testing). He may also have improved but not cured HTN after such a surgery (though reassuringly, one potential cause of such residual HTN being CKD is not clearly present in this case based on creatinine and UPCR). Finally complicating this case is the patient's prior difficulty with angiogram (we will discuss options, if any, with IR).   The potential benefits, which were discussed with the patient along with the above complicating factors, were cured vs improved HTN should we find our surgically addressable source of aldosterone, which has been shown in observational studies to improve quality of life. He would also be able to stop potassium supplementation most likely (along with potential simplification of his BP meds). If a surgically addressable cause is not found, we will then almost certainly pursue aldosterone antagonism (e.g. spironolactone) to treat HTN, treat hyperkalemia, and most importantly, to attempt to reduce/block the cardiac effects of prolonged aldosterone exposure.    Not starting aldosterone antagonism now as it would interfere with the above tests. Reassuringly, his BP is currently controlled without this med class.      Hypokalemia  Treated on oral potassium supplementation.    Elevated serum bicarb  Newly to 33, previously 25-28. Unclear if from hyperaldo since new. Will CTM on subsequent labs.     GFR  Preserved.     Type 2 diabetes mellitus with other specified complication, without long-term current use of insulin (H)  No proteinuria. On ARB. On metformin.     Coronary artery disease involving coronary bypass graft of native heart, angina presence unspecified  See implication of hyperaldosteronism above.       Return to clinic in 3 months.     Omer Haji MD  Instructor  Nephrology  Pager [phone]

## 2021-05-01 ENCOUNTER — HEALTH MAINTENANCE LETTER (OUTPATIENT)
Age: 61
End: 2021-05-01

## 2021-05-07 LAB
ANNOTATION COMMENT IMP: NORMAL
METANEPHS SERPL-SCNC: NORMAL NMOL/L
NORMETANEPHRINE SERPL-SCNC: NORMAL NMOL/L

## 2021-05-20 ENCOUNTER — INFUSION THERAPY VISIT (OUTPATIENT)
Dept: INFUSION THERAPY | Facility: CLINIC | Age: 61
End: 2021-05-20
Attending: STUDENT IN AN ORGANIZED HEALTH CARE EDUCATION/TRAINING PROGRAM
Payer: COMMERCIAL

## 2021-05-20 VITALS
HEART RATE: 66 BPM | DIASTOLIC BLOOD PRESSURE: 84 MMHG | WEIGHT: 225.4 LBS | SYSTOLIC BLOOD PRESSURE: 132 MMHG | OXYGEN SATURATION: 98 % | TEMPERATURE: 98.2 F | RESPIRATION RATE: 16 BRPM | BODY MASS INDEX: 32.34 KG/M2

## 2021-05-20 DIAGNOSIS — I12.9 HYPERTENSION, RENAL: Primary | ICD-10-CM

## 2021-05-20 DIAGNOSIS — E26.9 HYPERALDOSTERONISM (H): ICD-10-CM

## 2021-05-20 LAB
POTASSIUM SERPL-SCNC: 3.1 MMOL/L (ref 3.4–5.3)
POTASSIUM SERPL-SCNC: 3.2 MMOL/L (ref 3.4–5.3)

## 2021-05-20 PROCEDURE — 82088 ASSAY OF ALDOSTERONE: CPT | Performed by: STUDENT IN AN ORGANIZED HEALTH CARE EDUCATION/TRAINING PROGRAM

## 2021-05-20 PROCEDURE — 96361 HYDRATE IV INFUSION ADD-ON: CPT

## 2021-05-20 PROCEDURE — 258N000003 HC RX IP 258 OP 636: Performed by: STUDENT IN AN ORGANIZED HEALTH CARE EDUCATION/TRAINING PROGRAM

## 2021-05-20 PROCEDURE — 84132 ASSAY OF SERUM POTASSIUM: CPT | Performed by: STUDENT IN AN ORGANIZED HEALTH CARE EDUCATION/TRAINING PROGRAM

## 2021-05-20 PROCEDURE — 96360 HYDRATION IV INFUSION INIT: CPT

## 2021-05-20 PROCEDURE — 84244 ASSAY OF RENIN: CPT | Performed by: STUDENT IN AN ORGANIZED HEALTH CARE EDUCATION/TRAINING PROGRAM

## 2021-05-20 PROCEDURE — 36415 COLL VENOUS BLD VENIPUNCTURE: CPT

## 2021-05-20 RX ADMIN — SODIUM CHLORIDE 2000 ML: 9 INJECTION, SOLUTION INTRAVENOUS at 09:24

## 2021-05-20 ASSESSMENT — PAIN SCALES - GENERAL: PAINLEVEL: NO PAIN (0)

## 2021-05-20 NOTE — LETTER
5/20/2021         RE: Adrian Petty  [address]  [facility] [address]        Dear Colleague,    Thank you for referring your patient, Adrian Petty, to the [facility]. Please see a copy of my visit note below.    Infusion Nursing Note:  Adrian Petty presents today for a salt loading/aldosterone suppression test.    Patient seen by provider today: No   present during visit today: Not Applicable.    Note:   -Patient in recumbent position for 1 hour prior to drawing labs followed by initiating NS infusion.   -VS completed pre, hourly during, and post infusion.   -BPs within parameters  -Labs drawn again immediately post infusion.    -Potassium level 3.2 pre infusion and 3.1 post infusion.     [ TORB ] Ordering provider: Dr. Haji  Order: Double daily PO potassium for today only.      Order received by: GLADIS Shepard RN     Follow-up/additional notes: plan relayed to patient whom voiced understanding.     Intravenous Access:  Peripheral IV placed.    Treatment Conditions:  Not Applicable.    Post Infusion Assessment:  Patient tolerated infusion without incident.     Discharge Plan:   Discharge instructions reviewed with: Patient.  Patient and/or family verbalized understanding of discharge instructions and all questions answered.  Patient discharged in stable condition accompanied by: self.      Rena Alfonso, GLADYS    Administrations This Visit     0.9% sodium chloride BOLUS     Admin Date  05/20/2021 Action  New Bag Dose  2,000 mL Rate  500 mL/hr Route  Intravenous Administered By  Manuel Shepard RN              BP (!) 165/89   Pulse 62   Temp 98.2  F (36.8  C) (Oral)   Resp 16   Wt 102.2 kg (225 lb 6.4 oz)   SpO2 98%   BMI 32.34 kg/m                      Again, thank you for allowing me to participate in the care of your patient.        Sincerely,        [facility]

## 2021-05-20 NOTE — PATIENT INSTRUCTIONS
Dear Adrian Petty    Thank you for choosing [facility] ([facility]) for your salt loading test.  The following information is a summary of our appointment as well as important reminders.      Please double your regular oral potassium dose today only per Dr. Haji.     We look forward in seeing you on your next appointment here at [facility]).  Please don t hesitate to call us at [phone] to reschedule any of your appointments or to speak with one of the [facility] registered nurses.  It was a pleasure taking care of you today.    Sincerely,    [facility]  [facility]  [address] [place] [address]  [id number]  Phone:  [phone]

## 2021-05-20 NOTE — PROGRESS NOTES
Infusion Nursing Note:  Adrian Petty presents today for a salt loading/aldosterone suppression test.    Patient seen by provider today: No   present during visit today: Not Applicable.    Note:   -Patient in recumbent position for 1 hour prior to drawing labs followed by initiating NS infusion.   -VS completed pre, hourly during, and post infusion.   -BPs within parameters  -Labs drawn again immediately post infusion.    -Potassium level 3.2 pre infusion and 3.1 post infusion.     [ TORB ] Ordering provider: Dr. Haji  Order: Double daily PO potassium for today only.      Order received by: GLADIS Shepard RN     Follow-up/additional notes: plan relayed to patient whom voiced understanding.     Intravenous Access:  Peripheral IV placed.    Treatment Conditions:  Not Applicable.    Post Infusion Assessment:  Patient tolerated infusion without incident.     Discharge Plan:   Discharge instructions reviewed with: Patient.  Patient and/or family verbalized understanding of discharge instructions and all questions answered.  Patient discharged in stable condition accompanied by: self.      Rena Alfonso RN    Administrations This Visit     0.9% sodium chloride BOLUS     Admin Date  05/20/2021 Action  New Bag Dose  2,000 mL Rate  500 mL/hr Route  Intravenous Administered By  Manuel Shepard RN              BP (!) 165/89   Pulse 62   Temp 98.2  F (36.8  C) (Oral)   Resp 16   Wt 102.2 kg (225 lb 6.4 oz)   SpO2 98%   BMI 32.34 kg/m

## 2021-05-21 LAB — ALDOST SERPL-MCNC: 13.1 NG/DL (ref 0–31)

## 2021-05-27 ENCOUNTER — OFFICE VISIT (OUTPATIENT)
Dept: CARDIOLOGY | Facility: CLINIC | Age: 61
End: 2021-05-27
Payer: COMMERCIAL

## 2021-05-27 DIAGNOSIS — I25.110 CORONARY ARTERY DISEASE INVOLVING NATIVE CORONARY ARTERY OF NATIVE HEART WITH UNSTABLE ANGINA PECTORIS (H): Primary | ICD-10-CM

## 2021-05-27 DIAGNOSIS — E11.65 TYPE 2 DIABETES MELLITUS WITH HYPERGLYCEMIA, WITHOUT LONG-TERM CURRENT USE OF INSULIN (H): ICD-10-CM

## 2021-05-27 DIAGNOSIS — E78.2 MIXED HYPERLIPIDEMIA: ICD-10-CM

## 2021-05-27 DIAGNOSIS — Z95.1 S/P CABG X 3: ICD-10-CM

## 2021-05-27 DIAGNOSIS — C61 PROSTATE CANCER (H): ICD-10-CM

## 2021-05-27 DIAGNOSIS — I10 ESSENTIAL HYPERTENSION: ICD-10-CM

## 2021-05-27 PROCEDURE — 99215 OFFICE O/P EST HI 40 MIN: CPT | Performed by: NURSE PRACTITIONER

## 2021-05-27 PROCEDURE — 93000 ELECTROCARDIOGRAM COMPLETE: CPT | Performed by: NURSE PRACTITIONER

## 2021-05-27 RX ORDER — POTASSIUM CHLORIDE 1500 MG/1
20 TABLET, EXTENDED RELEASE ORAL DAILY
Qty: 90 TABLET | Refills: 3 | COMMUNITY
Start: 2021-05-27

## 2021-05-27 RX ORDER — AMLODIPINE BESYLATE 10 MG/1
5 TABLET ORAL DAILY
Qty: 90 TABLET | Refills: 3 | Status: SHIPPED | OUTPATIENT
Start: 2021-05-27

## 2021-05-27 RX ORDER — ROSUVASTATIN CALCIUM 40 MG/1
40 TABLET, COATED ORAL DAILY
Qty: 90 TABLET | Refills: 3 | Status: SHIPPED | OUTPATIENT
Start: 2021-05-27

## 2021-05-27 RX ORDER — HYDROCHLOROTHIAZIDE 12.5 MG/1
12.5 TABLET ORAL DAILY
Qty: 90 TABLET | Refills: 3 | Status: SHIPPED | OUTPATIENT
Start: 2021-05-27

## 2021-05-27 NOTE — LETTER
5/27/2021    Emma Barlow MD  [address] [facility] [address]  [facility] [address]    RE: Adrian Petty       Dear Colleague,    I had the pleasure of seeing Adrian Petty in the [facility].  Cardiology Clinic Progress Note    Service Date: May 27, 2021    Primary Cardiologist: Dr. Mendes       Reason for Visit: Follow up post CABG    HPI:   I had the pleasure of meeting Mr. Adrian Petty in the clinic today. He is a very pleasant 61 year old male with a past medical history notable for hypertension, hyperlipidemia, type 2 diabetes mellitus, prostate cancer status post resection, chemotherapy, and radiation, as well as coronary artery disease.    The patient initially underwent PCI with a stent placed in 2003 in [place] at [facility]. More recently, he presented in 11/2020 with unstable angina and a small non-ST elevation MI with a troponin of 0.12. He was sent for coronary angiography revealing severe 3-vessel coronary artery disease with in-stent restenosis of the mid-LAD, severe distal LAD lesion, and a small circumflex artery and occluded right with left-to-right collaterals. He was referred for CABG which was completed by Dr. Herrera in 11/2020 with a LIMA to the LAD, saphenous vein graft to the PDA and saphenous vein graft to the diagonal. The circumflex artery was too small to bypass.     He was last seen in the clinic by Dr. Mendes in January 2021 following the CABG. He was doing quite well at that time and was making a nice recovery post-operatively. Routine follow up was recommended in about 6 months with a repeat echocardiogram beforehand.    The patient sent in a [employer] message in the meantime noting some pins and needles sensations at the sternal surgical incision site and soreness across his chest at times. Today's visit was arranged for further evaluation of his symptoms.    Radu states that for the past couple of weeks he has  "been having mild discomfort in his left, right, and center chest which comes on without much of a pattern. He has also had some \"pins-and-needles\" type of sensation over his incisional site on and off.  He describes the discomfort as feeling like a \"soreness\" or tightness at times. He states that if he pushes on the area or uses a cold pack that it typically resolves.  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  He has not been doing this recently since the pandemic and has been a bit less active than his usual.  He has been going for walks fairly regularly and has been tolerating this well without exertional symptoms.  He denies symptoms of palpitations, dizziness, lightheadedness, shortness of breath, orthopnea, or PND. He has noted mild lower extremity edema intermittently which is worse in his left leg where they harvested a graft for his bypass surgery.     He checks his blood pressure periodically at home and notes that the readings have been fairly stable around the 130 systolic and 70s diastolic.  He thinks that he has maybe put on a few pounds over the past few months after losing some weight following his hospital stay with his CABG. An EKG was completed in the clinic today demonstrating sinus rhythm with notched P waves consistent with left atrial enlargement, but no significant T wave or ST changes concerning for ischemia. The patient notes that previously leading up to his prior stenting and CABG he felt more exertional dyspnea and chest discomfort causing him to need to stop to rest when he was walking or refereeing basketball games.    ASSESSMENT AND PLAN:  1. Coronary artery disease status post CABG x3 in 11/2020  - Seems to be stable without clear anginal symptoms at this time. His recent chest discomfort seems atypical and more consistent with musculoskeletal issues possibly related to his sternal incision healing. His sternal " site appears well-healed with no evidence of non-union or instability on exam.   - LVEF has been preserved at 60-65% on previous TTE in 11/2020. A follow up echocardiogram was recommended in about 6 months for June of this year so would recommend he proceed with this for reevaluation of his LV function and wall motion post CABG. If any significant regional WMAs noted or decrease in EF, would consider proceeding with nuclear stress test to rule out ischemia.  - Continue with current regimen of aspirin 81 mg daily, metoprolol tartrate 100 mg twice daily, losartan 100 mg daily, rosuvastatin 40 mg daily, and amlodipine.    2. Hyperlipidemia  -Treated on rosuvastatin 40 mg daily with a most recent LDL cholesterol of 53 in January 2021.    3. Essential hypertension  - Mildly elevated in the clinic today.  He has had mild intermittent lower extremity edema which I suspect may be in part secondary to being on amlodipine at 10 mg daily.  Recommended that he decrease this to 5 mg once daily and start on hydrochlorothiazide 12.5 mg once daily.  He has previously had issues with hypokalemia and has been on a potassium supplement 10 mEq once daily.  Recommended that he increase this to 20 mEq once daily and repeat a basic metabolic panel about 1 week after starting on the hydrochlorothiazide.    4. Type 2 diabetes mellitus  -Treated on Metformin.    5. Prostate cancer status post resection, chemotherapy, and radiation    Thank you for the opportunity to participate in this pleasant patient's care. I will plan to have him see Dr. Mendes in follow-up in about 1 to 2 months with a repeat echocardiogram beforehand.  I encouraged him to call the clinic with any questions or concerns in the meantime, particularly if he notices any change in his clinical status or development of any exertional symptoms, and we would be happy to see him sooner if needed.    50 total minutes was spent today including chart review, precharting, history and  exam, post visit documentation, and reviewing studies as outlined above.     COLIN Gage, CNP   Nurse Practitioner  [facility]  Pager: [phone]  Text Page  (8am - 5pm, M-F)    Orders this Visit:  Orders Placed This Encounter   Procedures     Follow-Up with Cardiologist     EKG 12-lead complete w/read - Clinics (performed today)     Orders Placed This Encounter   Medications     rosuvastatin (CRESTOR) 40 MG tablet     Sig: Take 1 tablet (40 mg) by mouth daily     Dispense:  90 tablet     Refill:  3     potassium chloride ER (K-TAB) 20 MEQ CR tablet     Sig: Take 1 tablet (20 mEq) by mouth daily     Dispense:  90 tablet     Refill:  3     amLODIPine (NORVASC) 10 MG tablet     Sig: Take 0.5 tablets (5 mg) by mouth daily     Dispense:  90 tablet     Refill:  3     hydrochlorothiazide (HYDRODIURIL) 12.5 MG tablet     Sig: Take 1 tablet (12.5 mg) by mouth daily     Dispense:  90 tablet     Refill:  3     Medications Discontinued During This Encounter   Medication Reason     rosuvastatin (CRESTOR) 40 MG tablet Reorder     amLODIPine (NORVASC) 10 MG tablet      Encounter Diagnoses   Name Primary?     Coronary artery disease involving native coronary artery of native heart with unstable angina pectoris (H) Yes     S/P CABG x 3      Mixed hyperlipidemia      Essential hypertension      Type 2 diabetes mellitus with hyperglycemia, without long-term current use of insulin (H)      Prostate cancer (H)      CURRENT MEDICATIONS:  Current Outpatient Medications   Medication Sig Dispense Refill     acetaminophen (TYLENOL) 325 MG tablet Take 2 tablets (650 mg) by mouth every 4 hours as needed for other (multimodal surgical pain management along with NSAIDS and opioid medication as indicated based on pain control and physical function.)       Alcohol Swabs PADS Use to swab the area of the injection or anamika as directed   Per insurance coverage 100 each 0     amLODIPine (NORVASC) 10 MG tablet Take 0.5  tablets (5 mg) by mouth daily 90 tablet 3     aspirin (ASA) 81 MG EC tablet Take 1 tablet (81 mg) by mouth daily       blood glucose (NO BRAND SPECIFIED) lancets standard To use to test glucose level in the blood  Use to test blood sugar  2  times daily as directed. To accompany glucose monitor brands per insurance coverage. 100 each 0     blood glucose (NO BRAND SPECIFIED) test strip To use to test glucose level in the blood Use to test blood sugar  2 times daily as directed. To accompany glucose monitor brands per insurance coverage. 100 strip 6     blood glucose calibration (NO BRAND SPECIFIED) solution Used to calibrate the blood glucose monitor as needed and as directed.  To accompany  blood glucose brands per insurance coverage 1 Bottle 0     blood glucose monitoring (NO BRAND SPECIFIED) meter device kit Use as directed   Per insurance coverage 1 kit 0     glipiZIDE (GLUCOTROL XL) 2.5 MG 24 hr tablet Take 1 tablet (2.5 mg) by mouth daily 90 tablet 2     hydrochlorothiazide (HYDRODIURIL) 12.5 MG tablet Take 1 tablet (12.5 mg) by mouth daily 90 tablet 3     losartan (COZAAR) 100 MG tablet Take 1 tablet (100 mg) by mouth daily 90 tablet 1     metFORMIN (GLUCOPHAGE-XR) 500 MG 24 hr tablet Take 1 tablet (500 mg) by mouth 2 times daily (with meals) (Patient taking differently: Take 500 mg by mouth daily (with dinner) ) 180 tablet 1     metoprolol tartrate (LOPRESSOR) 100 MG tablet Take 1 tablet (100 mg) by mouth 2 times daily 160 tablet 3     nitroGLYcerin (NITROSTAT) 0.3 MG sublingual tablet For chest pain place 1 tablet under the tongue every 5 minutes for 3 doses. If symptoms persist 5 minutes after 1st dose call 911. 30 tablet 3     potassium chloride ER (K-TAB) 20 MEQ CR tablet Take 1 tablet (20 mEq) by mouth daily 90 tablet 3     potassium chloride ER (KLOR-CON M) 10 MEQ CR tablet Take 2 tablets (20 mEq) by mouth daily (Patient taking differently: Take 10 mEq by mouth daily ) 10 tablet 0     rosuvastatin  (CRESTOR) 40 MG tablet Take 1 tablet (40 mg) by mouth daily 90 tablet 3     sertraline (ZOLOFT) 50 MG tablet Take 50 mg by mouth daily       ALLERGIES  No Known Allergies    PAST MEDICAL, SURGICAL, FAMILY HISTORY:  History was reviewed and updated as needed, see medical record.    SOCIAL HISTORY:  Social History     Socioeconomic History     Marital status:      Spouse name: Not on file     Number of children: Not on file     Years of education: Not on file     Highest education level: Not on file   Occupational History     Not on file   Social Needs     Financial resource strain: Not on file     Food insecurity     Worry: Not on file     Inability: Not on file     Transportation needs     Medical: Not on file     Non-medical: Not on file   Tobacco Use     Smoking status: Never Smoker     Smokeless tobacco: Former User   Substance and Sexual Activity     Alcohol use: Yes     Comment: 1 beer per week     Drug use: Not Currently     Sexual activity: Not on file   Lifestyle     Physical activity     Days per week: Not on file     Minutes per session: Not on file     Stress: Not on file   Relationships     Social connections     Talks on phone: Not on file     Gets together: Not on file     Attends Judaism service: Not on file     Active member of club or organization: Not on file     Attends meetings of clubs or organizations: Not on file     Relationship status: Not on file     Intimate partner violence     Fear of current or ex partner: Not on file     Emotionally abused: Not on file     Physically abused: Not on file     Forced sexual activity: Not on file   Other Topics Concern     Parent/sibling w/ CABG, MI or angioplasty before 65F 55M? Not Asked   Social History Narrative     Not on file     Review of Systems:  Skin:  Negative     Eyes:  Negative    ENT:  Negative    Respiratory:  Positive for cough  Cardiovascular:  Negative;palpitations;exercise intolerance;syncope or  near-syncope;cyanosis;lightheadedness;dizziness Positive for;fatigue;edema  Gastroenterology: Negative    Genitourinary:  Negative    Musculoskeletal:  Negative    Neurologic:  Negative    Psychiatric:  Negative    Heme/Lymph/Imm:  Negative    Endocrine:  Positive for diabetes     Physical Exam:  Vitals: There were no vitals taken for this visit.   Wt Readings from Last 4 Encounters:   05/20/21 102.2 kg (225 lb 6.4 oz)   02/10/21 98.9 kg (218 lb)   01/14/21 99.8 kg (220 lb)   11/24/20 98.9 kg (218 lb)     CONSTITUTIONAL: Appears his stated age, well nourished, and in no acute distress.  HEENT: Pupils equal, round. Sclerae nonicteric.    NECK: Supple, no masses or JVD appreciated. Carotid pulses full and equal with no bruit bilaterally.   C/V: Regular rate and rhythm, normal S1 and S2, no S3 or S4, no murmur, rub or gallop.  Sternal incision site appears well approximated with no significant erythema or edema. No sternal click or pop to deep palpation of the sternal incision site.  RESP: Respirations are unlabored. Lungs are clear to auscultation bilaterally without wheezing, rales, or rhonchi.  GI: Abdomen soft, non-tender, non-distended.  EXTREM: Trace lower extremity edema bilaterally. No clubbing or cyanosis.  NEURO: Alert and oriented, cooperative. Gait steady. No gross focal deficits.   PSYCH: Affect appropriate. Mentation normal. Responds to questions appropriately.  SKIN: Warm and dry. No apparent rashes or bruising.    Recent Lab Results reviewed today:  LIPID RESULTS:  Lab Results   Component Value Date    CHOL 128 01/19/2021    HDL 37 (L) 01/19/2021    LDL 53 01/19/2021    TRIG 191 (H) 01/19/2021     LIVER ENZYME RESULTS:  Lab Results   Component Value Date    AST 36 11/10/2020    ALT 22 01/19/2021     CBC RESULTS:  Lab Results   Component Value Date    WBC 6.2 04/29/2021    RBC 5.19 04/29/2021    HGB 14.1 04/29/2021    HCT 43.8 04/29/2021    MCV 84 04/29/2021    MCH 27.2 04/29/2021    MCHC 32.2 04/29/2021     RDW 14.8 04/29/2021     04/29/2021     BMP RESULTS:  Lab Results   Component Value Date     04/29/2021    POTASSIUM 3.1 (L) 05/20/2021    CHLORIDE 107 04/29/2021    CO2 33 (H) 04/29/2021    ANIONGAP <1 (L) 04/29/2021    GLC 98 04/29/2021    BUN 15 04/29/2021    CR 1.00 04/29/2021    GFRESTIMATED 80 04/29/2021    GFRESTBLACK >90 04/29/2021    NADEEN 9.3 04/29/2021      A1C RESULTS:  Lab Results   Component Value Date    A1C 6.2 (H) 01/19/2021     This note was completed in part using Dragon voice recognition software. Although reviewed after completion, some word and grammatical errors may occur.    Thank you for allowing me to participate in the care of your patient.      Sincerely,     Cade Jacobo NP     [facility]    cc:   No referring provider defined for this encounter.

## 2021-05-27 NOTE — PROGRESS NOTES
"  Cardiology Clinic Progress Note    Service Date: May 27, 2021    Primary Cardiologist: Dr. Mendes       Reason for Visit: Follow up post CABG    HPI:   I had the pleasure of meeting Mr. Adrian Petty in the clinic today. He is a very pleasant 61 year old male with a past medical history notable for hypertension, hyperlipidemia, type 2 diabetes mellitus, prostate cancer status post resection, chemotherapy, and radiation, as well as coronary artery disease.    The patient initially underwent PCI with a stent placed in 2003 in [place] at . More recently, he presented in 11/2020 with unstable angina and a small non-ST elevation MI with a troponin of 0.12. He was sent for coronary angiography revealing severe 3-vessel coronary artery disease with in-stent restenosis of the mid-LAD, severe distal LAD lesion, and a small circumflex artery and occluded right with left-to-right collaterals. He was referred for CABG which was completed by Dr. Herrera in 11/2020 with a LIMA to the LAD, saphenous vein graft to the PDA and saphenous vein graft to the diagonal. The circumflex artery was too small to bypass.     He was last seen in the clinic by Dr. Mendes in January 2021 following the CABG. He was doing quite well at that time and was making a nice recovery post-operatively. Routine follow up was recommended in about 6 months with a repeat echocardiogram beforehand.    The patient sent in a [employer] message in the meantime noting some pins and needles sensations at the sternal surgical incision site and soreness across his chest at times. Today's visit was arranged for further evaluation of his symptoms.    Radu states that for the past couple of weeks he has been having mild discomfort in his left, right, and center chest which comes on without much of a pattern. He has also had some \"pins-and-needles\" type of sensation over his incisional site on and off.  He describes the discomfort as feeling like a " "\"soreness\" or tightness at times. He states that if he pushes on the area or uses a cold pack that it typically resolves.  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  He has not been doing this recently since the pandemic and has been a bit less active than his usual.  He has been going for walks fairly regularly and has been tolerating this well without exertional symptoms.  He denies symptoms of palpitations, dizziness, lightheadedness, shortness of breath, orthopnea, or PND. He has noted mild lower extremity edema intermittently which is worse in his left leg where they harvested a graft for his bypass surgery.     He checks his blood pressure periodically at home and notes that the readings have been fairly stable around the 130 systolic and 70s diastolic.  He thinks that he has maybe put on a few pounds over the past few months after losing some weight following his hospital stay with his CABG. An EKG was completed in the clinic today demonstrating sinus rhythm with notched P waves consistent with left atrial enlargement, but no significant T wave or ST changes concerning for ischemia. The patient notes that previously leading up to his prior stenting and CABG he felt more exertional dyspnea and chest discomfort causing him to need to stop to rest when he was walking or refereeing basketball games.    ASSESSMENT AND PLAN:  1. Coronary artery disease status post CABG x3 in 11/2020  - Seems to be stable without clear anginal symptoms at this time. His recent chest discomfort seems atypical and more consistent with musculoskeletal issues possibly related to his sternal incision healing. His sternal site appears well-healed with no evidence of non-union or instability on exam.   - LVEF has been preserved at 60-65% on previous TTE in 11/2020. A follow up echocardiogram was recommended in about 6 months for June of this year so would recommend he " proceed with this for reevaluation of his LV function and wall motion post CABG. If any significant regional WMAs noted or decrease in EF, would consider proceeding with nuclear stress test to rule out ischemia.  - Continue with current regimen of aspirin 81 mg daily, metoprolol tartrate 100 mg twice daily, losartan 100 mg daily, rosuvastatin 40 mg daily, and amlodipine.    2. Hyperlipidemia  -Treated on rosuvastatin 40 mg daily with a most recent LDL cholesterol of 53 in January 2021.    3. Essential hypertension  - Mildly elevated in the clinic today.  He has had mild intermittent lower extremity edema which I suspect may be in part secondary to being on amlodipine at 10 mg daily.  Recommended that he decrease this to 5 mg once daily and start on hydrochlorothiazide 12.5 mg once daily.  He has previously had issues with hypokalemia and has been on a potassium supplement 10 mEq once daily.  Recommended that he increase this to 20 mEq once daily and repeat a basic metabolic panel about 1 week after starting on the hydrochlorothiazide.    4. Type 2 diabetes mellitus  -Treated on Metformin.    5. Prostate cancer status post resection, chemotherapy, and radiation    Thank you for the opportunity to participate in this pleasant patient's care. I will plan to have him see Dr. Mendes in follow-up in about 1 to 2 months with a repeat echocardiogram beforehand.  I encouraged him to call the clinic with any questions or concerns in the meantime, particularly if he notices any change in his clinical status or development of any exertional symptoms, and we would be happy to see him sooner if needed.    50 total minutes was spent today including chart review, precharting, history and exam, post visit documentation, and reviewing studies as outlined above.     COLIN Gage, CNP   Nurse Practitioner  [facility] [place] [facility]  Pager: [phone]  Text Page  (8am - 5pm, M-F)    Orders this Visit:  Orders Placed This  Encounter   Procedures     Follow-Up with Cardiologist     EKG 12-lead complete w/read - Clinics (performed today)     Orders Placed This Encounter   Medications     rosuvastatin (CRESTOR) 40 MG tablet     Sig: Take 1 tablet (40 mg) by mouth daily     Dispense:  90 tablet     Refill:  3     potassium chloride ER (K-TAB) 20 MEQ CR tablet     Sig: Take 1 tablet (20 mEq) by mouth daily     Dispense:  90 tablet     Refill:  3     amLODIPine (NORVASC) 10 MG tablet     Sig: Take 0.5 tablets (5 mg) by mouth daily     Dispense:  90 tablet     Refill:  3     hydrochlorothiazide (HYDRODIURIL) 12.5 MG tablet     Sig: Take 1 tablet (12.5 mg) by mouth daily     Dispense:  90 tablet     Refill:  3     Medications Discontinued During This Encounter   Medication Reason     rosuvastatin (CRESTOR) 40 MG tablet Reorder     amLODIPine (NORVASC) 10 MG tablet      Encounter Diagnoses   Name Primary?     Coronary artery disease involving native coronary artery of native heart with unstable angina pectoris (H) Yes     S/P CABG x 3      Mixed hyperlipidemia      Essential hypertension      Type 2 diabetes mellitus with hyperglycemia, without long-term current use of insulin (H)      Prostate cancer (H)      CURRENT MEDICATIONS:  Current Outpatient Medications   Medication Sig Dispense Refill     acetaminophen (TYLENOL) 325 MG tablet Take 2 tablets (650 mg) by mouth every 4 hours as needed for other (multimodal surgical pain management along with NSAIDS and opioid medication as indicated based on pain control and physical function.)       Alcohol Swabs PADS Use to swab the area of the injection or anamika as directed   Per insurance coverage 100 each 0     amLODIPine (NORVASC) 10 MG tablet Take 0.5 tablets (5 mg) by mouth daily 90 tablet 3     aspirin (ASA) 81 MG EC tablet Take 1 tablet (81 mg) by mouth daily       blood glucose (NO BRAND SPECIFIED) lancets standard To use to test glucose level in the blood  Use to test blood sugar  2  times  daily as directed. To accompany glucose monitor brands per insurance coverage. 100 each 0     blood glucose (NO BRAND SPECIFIED) test strip To use to test glucose level in the blood Use to test blood sugar  2 times daily as directed. To accompany glucose monitor brands per insurance coverage. 100 strip 6     blood glucose calibration (NO BRAND SPECIFIED) solution Used to calibrate the blood glucose monitor as needed and as directed.  To accompany  blood glucose brands per insurance coverage 1 Bottle 0     blood glucose monitoring (NO BRAND SPECIFIED) meter device kit Use as directed   Per insurance coverage 1 kit 0     glipiZIDE (GLUCOTROL XL) 2.5 MG 24 hr tablet Take 1 tablet (2.5 mg) by mouth daily 90 tablet 2     hydrochlorothiazide (HYDRODIURIL) 12.5 MG tablet Take 1 tablet (12.5 mg) by mouth daily 90 tablet 3     losartan (COZAAR) 100 MG tablet Take 1 tablet (100 mg) by mouth daily 90 tablet 1     metFORMIN (GLUCOPHAGE-XR) 500 MG 24 hr tablet Take 1 tablet (500 mg) by mouth 2 times daily (with meals) (Patient taking differently: Take 500 mg by mouth daily (with dinner) ) 180 tablet 1     metoprolol tartrate (LOPRESSOR) 100 MG tablet Take 1 tablet (100 mg) by mouth 2 times daily 160 tablet 3     nitroGLYcerin (NITROSTAT) 0.3 MG sublingual tablet For chest pain place 1 tablet under the tongue every 5 minutes for 3 doses. If symptoms persist 5 minutes after 1st dose call 911. 30 tablet 3     potassium chloride ER (K-TAB) 20 MEQ CR tablet Take 1 tablet (20 mEq) by mouth daily 90 tablet 3     potassium chloride ER (KLOR-CON M) 10 MEQ CR tablet Take 2 tablets (20 mEq) by mouth daily (Patient taking differently: Take 10 mEq by mouth daily ) 10 tablet 0     rosuvastatin (CRESTOR) 40 MG tablet Take 1 tablet (40 mg) by mouth daily 90 tablet 3     sertraline (ZOLOFT) 50 MG tablet Take 50 mg by mouth daily       ALLERGIES  No Known Allergies    PAST MEDICAL, SURGICAL, FAMILY HISTORY:  History was reviewed and updated as  needed, see medical record.    SOCIAL HISTORY:  Social History     Socioeconomic History     Marital status:      Spouse name: Not on file     Number of children: Not on file     Years of education: Not on file     Highest education level: Not on file   Occupational History     Not on file   Social Needs     Financial resource strain: Not on file     Food insecurity     Worry: Not on file     Inability: Not on file     Transportation needs     Medical: Not on file     Non-medical: Not on file   Tobacco Use     Smoking status: Never Smoker     Smokeless tobacco: Former User   Substance and Sexual Activity     Alcohol use: Yes     Comment: 1 beer per week     Drug use: Not Currently     Sexual activity: Not on file   Lifestyle     Physical activity     Days per week: Not on file     Minutes per session: Not on file     Stress: Not on file   Relationships     Social connections     Talks on phone: Not on file     Gets together: Not on file     Attends Voodoo service: Not on file     Active member of club or organization: Not on file     Attends meetings of clubs or organizations: Not on file     Relationship status: Not on file     Intimate partner violence     Fear of current or ex partner: Not on file     Emotionally abused: Not on file     Physically abused: Not on file     Forced sexual activity: Not on file   Other Topics Concern     Parent/sibling w/ CABG, MI or angioplasty before 65F 55M? Not Asked   Social History Narrative     Not on file     Review of Systems:  Skin:  Negative     Eyes:  Negative    ENT:  Negative    Respiratory:  Positive for cough  Cardiovascular:  Negative;palpitations;exercise intolerance;syncope or near-syncope;cyanosis;lightheadedness;dizziness Positive for;fatigue;edema  Gastroenterology: Negative    Genitourinary:  Negative    Musculoskeletal:  Negative    Neurologic:  Negative    Psychiatric:  Negative    Heme/Lymph/Imm:  Negative    Endocrine:  Positive for diabetes      Physical Exam:  Vitals: There were no vitals taken for this visit.   Wt Readings from Last 4 Encounters:   05/20/21 102.2 kg (225 lb 6.4 oz)   02/10/21 98.9 kg (218 lb)   01/14/21 99.8 kg (220 lb)   11/24/20 98.9 kg (218 lb)     CONSTITUTIONAL: Appears his stated age, well nourished, and in no acute distress.  HEENT: Pupils equal, round. Sclerae nonicteric.    NECK: Supple, no masses or JVD appreciated. Carotid pulses full and equal with no bruit bilaterally.   C/V: Regular rate and rhythm, normal S1 and S2, no S3 or S4, no murmur, rub or gallop.  Sternal incision site appears well approximated with no significant erythema or edema. No sternal click or pop to deep palpation of the sternal incision site.  RESP: Respirations are unlabored. Lungs are clear to auscultation bilaterally without wheezing, rales, or rhonchi.  GI: Abdomen soft, non-tender, non-distended.  EXTREM: Trace lower extremity edema bilaterally. No clubbing or cyanosis.  NEURO: Alert and oriented, cooperative. Gait steady. No gross focal deficits.   PSYCH: Affect appropriate. Mentation normal. Responds to questions appropriately.  SKIN: Warm and dry. No apparent rashes or bruising.    Recent Lab Results reviewed today:  LIPID RESULTS:  Lab Results   Component Value Date    CHOL 128 01/19/2021    HDL 37 (L) 01/19/2021    LDL 53 01/19/2021    TRIG 191 (H) 01/19/2021     LIVER ENZYME RESULTS:  Lab Results   Component Value Date    AST 36 11/10/2020    ALT 22 01/19/2021     CBC RESULTS:  Lab Results   Component Value Date    WBC 6.2 04/29/2021    RBC 5.19 04/29/2021    HGB 14.1 04/29/2021    HCT 43.8 04/29/2021    MCV 84 04/29/2021    MCH 27.2 04/29/2021    MCHC 32.2 04/29/2021    RDW 14.8 04/29/2021     04/29/2021     BMP RESULTS:  Lab Results   Component Value Date     04/29/2021    POTASSIUM 3.1 (L) 05/20/2021    CHLORIDE 107 04/29/2021    CO2 33 (H) 04/29/2021    ANIONGAP <1 (L) 04/29/2021    GLC 98 04/29/2021    BUN 15 04/29/2021     CR 1.00 04/29/2021    GFRESTIMATED 80 04/29/2021    GFRESTBLACK >90 04/29/2021    NADEEN 9.3 04/29/2021      A1C RESULTS:  Lab Results   Component Value Date    A1C 6.2 (H) 01/19/2021     CC  Andrea Mendes MD  [id number] [id number]    This note was completed in part using Dragon voice recognition software. Although reviewed after completion, some word and grammatical errors may occur.

## 2021-05-27 NOTE — PATIENT INSTRUCTIONS
Thank you for your visit with the [facility] today.    Today's plan:   1. Decrease the amlodipine from 10 mg to 5 mg (1/2 tablet) once daily.  2. Start taking hydrochlorothiazide 12.5 mg once daily.  3. Increase your potassium from 10 to 20 mEq once daily.  4. Check labs in about 1-2 weeks to recheck your potassium and kidney function.  5. Check an echocardiogram in the next couple of weeks.  6. Follow up with Dr. Mendes in 1-2 months.     If you have questions or concerns, please do not hesitate to call my nurse, Trista, at [phone].     Scheduling phone number: [phone]    It was a pleasure seeing you today!     COLIN Gage, CNP  Nurse Practitioner  [facility]  May 27, 2021  ________________________________________________________

## 2021-06-01 ENCOUNTER — TELEPHONE (OUTPATIENT)
Dept: NEPHROLOGY | Facility: CLINIC | Age: 61
End: 2021-06-01
Payer: COMMERCIAL

## 2021-06-01 ENCOUNTER — HOSPITAL ENCOUNTER (OUTPATIENT)
Dept: CARDIOLOGY | Facility: CLINIC | Age: 61
Discharge: HOME | End: 2021-06-01
Attending: INTERNAL MEDICINE | Admitting: INTERNAL MEDICINE
Payer: COMMERCIAL

## 2021-06-01 DIAGNOSIS — I25.810 CORONARY ARTERY DISEASE INVOLVING AUTOLOGOUS ARTERY CORONARY BYPASS GRAFT WITHOUT ANGINA PECTORIS: ICD-10-CM

## 2021-06-01 PROCEDURE — 999N000208 ECHOCARDIOGRAM COMPLETE

## 2021-06-01 PROCEDURE — 255N000002 HC RX 255 OP 636: Performed by: INTERNAL MEDICINE

## 2021-06-01 PROCEDURE — 93306 TTE W/DOPPLER COMPLETE: CPT | Mod: 26 | Performed by: INTERNAL MEDICINE

## 2021-06-01 PROCEDURE — 99207 PR NO BILLABLE SERVICE THIS VISIT: CPT | Performed by: STUDENT IN AN ORGANIZED HEALTH CARE EDUCATION/TRAINING PROGRAM

## 2021-06-01 RX ADMIN — HUMAN ALBUMIN MICROSPHERES AND PERFLUTREN 9 ML: 10; .22 INJECTION, SOLUTION INTRAVENOUS at 09:42

## 2021-06-01 NOTE — TELEPHONE ENCOUNTER
Telephone Call    Nephrology    I called to review what results we currently have in the workup of potential hyperaldosteronism for this patient who had two positive screening tests and has now undergone a saline loading confirmatory test.     With regard to the saline loading test, the patient had aldosterone of 13.1 (which is > 10) despite a serum potassium of 3.2 (he neglected to take his AM potassium-chloride that day). His renin activity is still pending - I called and discussed with the lab and they are working on finding this result (but they are concerned it may not have been run). Nonetheless, the patient had renin activity < 0.1 on both screening tests (thus, renin appears suppressed at baseline). Will await follow-up from our lab. Next step would be a CT scan to look for unilateral adrenal mass(es) or unilateral hyperplasia.     I discussed these findings, the potential issue with the renin-lab in the context of his screening findings, and re-discussed pro's and con's of proceeding down this workup path in the context of his other medical problems and the dual-indications for most of his antihypertensives with the patient, and he would like to proceed to CT scan when able.     I've also discussed the patient's back pain issue with IR, and they believe they may be able to offer some degree of sedation that may help make adrenal vein sampling possible for this patient should AVS be needed. This was also shared with the patient during this call.     No billable service.     Sandor Haji MD  Renal   [phone]  
[phone]

## 2021-06-02 ENCOUNTER — MYC MEDICAL ADVICE (OUTPATIENT)
Dept: CARDIOLOGY | Facility: CLINIC | Age: 61
End: 2021-06-02

## 2021-06-02 LAB — LAB SCANNED RESULT: NORMAL

## 2021-06-02 NOTE — TELEPHONE ENCOUNTER
Call out to pt to review the Echo results. Let pt know his Echo is reassuring that it suggests normal LV pumping function and no significant valvular findings. Pt had no specific symptom c/o today, other than the symptoms below which have been stable and unchanged from recent visit with PALMA Hung. I told pt I will send this update to Dr. Mendes and get back to him next week, as he's out of the office the remainder of this week as well as Hung. RN phone number given to pt and advised he call us back sooner if he has worsening symptoms or changes to symptoms. Amber Morris RN on 6/2/2021 at 1:02 PM        Question or concern about a visit in the past week  6/2/2021 7:58 AM Reply    To: [facility]      From: Radu Petty      Created: 6/2/2021 7:58 AM        *-*-*This message has not been handled.*-*-*    Good Morning.  I was in the office yesterday and had a echo done on my heart. received the results back yesterday.  It really doesnt answer any of my questions.  If they were unable to see the pictures, we should be able to do something else.  I took the medicine to make sure the pictures were better.  Ever since my surgery I have not been feeling that good.  I am tired all the time, some pain in my chest which I thought might be the healing process.  What can we do to get a better picture?  I do not understand what those percentages mean?   I will be waiting a response.  Thank you so much for your help.

## 2021-06-03 DIAGNOSIS — E11.69 TYPE 2 DIABETES MELLITUS WITH OTHER SPECIFIED COMPLICATION, WITHOUT LONG-TERM CURRENT USE OF INSULIN (H): ICD-10-CM

## 2021-06-04 ENCOUNTER — TELEPHONE (OUTPATIENT)
Dept: NEPHROLOGY | Facility: CLINIC | Age: 61
End: 2021-06-04
Payer: COMMERCIAL

## 2021-06-04 DIAGNOSIS — E26.9 HYPERALDOSTERONISM (H): Primary | ICD-10-CM

## 2021-06-04 PROCEDURE — 99207 PR NO BILLABLE SERVICE THIS VISIT: CPT | Performed by: STUDENT IN AN ORGANIZED HEALTH CARE EDUCATION/TRAINING PROGRAM

## 2021-06-04 RX ORDER — METFORMIN HCL 500 MG
500 TABLET, EXTENDED RELEASE 24 HR ORAL 2 TIMES DAILY WITH MEALS
Qty: 180 TABLET | Refills: 1 | Status: SHIPPED | OUTPATIENT
Start: 2021-06-04 | End: 2021-08-06

## 2021-06-04 NOTE — TELEPHONE ENCOUNTER
Prescription approved per [facility] Refill Protocol.  Anna Albright RN

## 2021-06-04 NOTE — TELEPHONE ENCOUNTER
Nephrology Brief Note    Hyperaldosteronism confirmed on saline loading test (positive despite hypoK, which would lower the sensitivity). Discussed pros and cons with patient of AVS vs CT as next step. He would like to do CT next given that he may be able to avoid AVS (which he may have difficulty tolerating; also most of his antiHTN meds are dual-cardiac meds, which will remain indicated even after surgical improvement in HTN) if bilat adrenal hyperplasia is seen on CT. Thus a CT scan has been ordered. Next appt with me is 7/30, at which point he and I may potentially be deciding on proceding with AVS or not.     No billable service.     Sandor Haji MD  Renal

## 2021-06-06 NOTE — TELEPHONE ENCOUNTER
Not sure what the question is about the picture quality.  His echo is showing normal LV function and normal ejection fraction.  This is encouraging after surgery.  If he does not have anginal symptoms, I would not recommend further testing.  If he has anginal chest discomfort, nuclear stress test can be considered.  There is a possibility that his fatigue and him being tired may be due to high dose of metoprolol.  Have him try to back down the metoprolol to 50 mg twice a day and see how he feels.  Schedule to see me in clinic soon with the next availability and a lipid panel check.

## 2021-06-10 ENCOUNTER — ANCILLARY PROCEDURE (OUTPATIENT)
Dept: CT IMAGING | Facility: CLINIC | Age: 61
End: 2021-06-10
Attending: STUDENT IN AN ORGANIZED HEALTH CARE EDUCATION/TRAINING PROGRAM
Payer: COMMERCIAL

## 2021-06-10 DIAGNOSIS — E26.9 HYPERALDOSTERONISM (H): ICD-10-CM

## 2021-06-10 PROCEDURE — 74176 CT ABD & PELVIS W/O CONTRAST: CPT | Mod: GC | Performed by: RADIOLOGY

## 2021-07-02 ENCOUNTER — TELEPHONE (OUTPATIENT)
Dept: FAMILY MEDICINE | Facility: CLINIC | Age: 61
End: 2021-07-02

## 2021-07-02 DIAGNOSIS — E11.65 TYPE 2 DIABETES MELLITUS WITH HYPERGLYCEMIA, WITHOUT LONG-TERM CURRENT USE OF INSULIN (H): Primary | ICD-10-CM

## 2021-07-02 RX ORDER — GLIPIZIDE 2.5 MG/1
2.5 TABLET, EXTENDED RELEASE ORAL DAILY
Qty: 90 TABLET | Refills: 0 | Status: SHIPPED | OUTPATIENT
Start: 2021-07-02 | End: 2021-07-14

## 2021-07-09 DIAGNOSIS — Z12.5 SCREENING FOR PROSTATE CANCER: Primary | ICD-10-CM

## 2021-07-09 DIAGNOSIS — I12.9 HYPERTENSION, RENAL: ICD-10-CM

## 2021-07-14 ENCOUNTER — PATIENT OUTREACH (OUTPATIENT)
Dept: EDUCATION SERVICES | Facility: CLINIC | Age: 61
End: 2021-07-14
Attending: INTERNAL MEDICINE
Payer: COMMERCIAL

## 2021-07-14 DIAGNOSIS — E11.65 TYPE 2 DIABETES MELLITUS WITH HYPERGLYCEMIA, WITHOUT LONG-TERM CURRENT USE OF INSULIN (H): ICD-10-CM

## 2021-07-14 DIAGNOSIS — E11.9 TYPE 2 DIABETES MELLITUS WITHOUT COMPLICATION, WITHOUT LONG-TERM CURRENT USE OF INSULIN (H): ICD-10-CM

## 2021-07-14 PROCEDURE — 98968 PH1 ASSMT&MGMT NQHP 21-30: CPT

## 2021-07-14 RX ORDER — GLIPIZIDE 2.5 MG/1
5 TABLET, EXTENDED RELEASE ORAL DAILY
Qty: 180 TABLET | Refills: 2 | Status: SHIPPED | OUTPATIENT
Start: 2021-07-14

## 2021-07-14 NOTE — PROGRESS NOTES
"Diabetes Self-Management Education & Support    Presents for: Individual review    Type of Service: Telephone Visit    How would patient like to obtain AVS? Irina    SUBJECTIVE/OBJECTIVE:  Presents for: Individual review  Accompanied by: Self  Focus of Visit: Taking Medication  Diabetes type: Type 2  Disease course: Worsening  How confident are you filling out medical forms by yourself:: Not Assessed  Diabetes management related comments/concerns: I think my doctor was a little confused on my meds. I stopped metformin and he added glipizide but I was already on glipizide. Now my numbers yi higher.  Difficulty affording diabetes medication?: No  Difficulty affording diabetes testing supplies?: No  Other concerns:: None  Cultural Influences/Ethnic Background:  Choose not to answer    Diabetes Symptoms & Complications:  Fatigue: Yes  Polydipsia: Yes  Polyuria: Yes  Visual change: Sometimes  Symptom course: Worsening  Complications assessed today?: No    Patient Problem List and Family Medical History reviewed for relevant medical history, current medical status, and diabetes risk factors.    Vitals:  There were no vitals taken for this visit.  Estimated body mass index is 32.34 kg/m  as calculated from the following:    Height as of 2/10/21: 1.778 m (5' 10\").    Weight as of 5/20/21: 102.2 kg (225 lb 6.4 oz).   Last 3 BP:   BP Readings from Last 3 Encounters:   05/20/21 132/84   02/10/21 120/70   01/14/21 139/88       History   Smoking Status     Never Smoker   Smokeless Tobacco     Former User       Labs:  Lab Results   Component Value Date    A1C 6.2 01/19/2021     Lab Results   Component Value Date    GLC 98 04/29/2021     Lab Results   Component Value Date    LDL 53 01/19/2021     HDL Cholesterol   Date Value Ref Range Status   01/19/2021 37 (L) >39 mg/dL Final   ]  GFR Estimate   Date Value Ref Range Status   04/29/2021 80 >60 mL/min/[1.73_m2] Final     Comment:     Non  GFR Calc  Starting " 12/18/2018, serum creatinine based estimated GFR (eGFR) will be   calculated using the Chronic Kidney Disease Epidemiology Collaboration   (CKD-EPI) equation.       GFR Estimate If Black   Date Value Ref Range Status   04/29/2021 >90 >60 mL/min/[1.73_m2] Final     Comment:      GFR Calc  Starting 12/18/2018, serum creatinine based estimated GFR (eGFR) will be   calculated using the Chronic Kidney Disease Epidemiology Collaboration   (CKD-EPI) equation.       Lab Results   Component Value Date    CR 1.00 04/29/2021     No results found for: MICROALBUMIN    Healthy Eating:  Healthy Eating Assessed Today: Yes  Meal planning/habits: Avoiding sweets, Carb counting  Other: watch carb portions, cut out pop  Beverages: Water, Coffee    Being Active:  Being Active Assessed Today: Yes  Exercise:: Currently not exercising    Monitoring:  Monitoring Assessed Today: Yes  Blood Glucose Meter: Unknown  Times checking blood sugar at home (number): 1  Times checking blood sugar at home (per): Day  Blood glucose trend: Fluctuating    Fasting blood sugars - 165, 179, 180, 165, 200    Taking Medications:  Diabetes Medication(s)     Biguanides       metFORMIN (GLUCOPHAGE-XR) 500 MG 24 hr tablet    Take 1 tablet (500 mg) by mouth 2 times daily (with meals)     Patient not taking: Reported on 7/14/2021    Sulfonylureas       glipiZIDE (GLUCOTROL XL) 2.5 MG 24 hr tablet    Take 2 tablets (5 mg) by mouth daily          Taking Medication Assessed Today: Yes  Current Treatments: Oral Medication (taken by mouth)  Problems taking diabetes medications regularly?: No  Diabetes medication side effects?: No    Problem Solving:  Problem Solving Assessed Today: Yes    Reducing Risks:  Reducing Risks Assessed Today: Yes  Diabetes Risks: Age over 45 years, Sedentary Lifestyle  CAD Risks: Diabetes Mellitus, Male sex, Sedentary lifestyle, Obesity  Has dilated eye exam at least once a year?: No (Appt in August)  Sees dentist every 6  months?: No    Healthy Coping:  Healthy Coping Assessed Today: No  Emotional response to diabetes: Concern for health and well-being  Informal Support system:: Spouse  Stage of change: ACTION (Actively working towards change)  Support resources: None  Patient Activation Measure Survey Score:  No flowsheet data found.    Diabetes knowledge and skills assessment:   Patient is knowledgeable in diabetes management concepts related to: Healthy Eating, Being Active, Monitoring and Taking Medication    Patient needs further education on the following diabetes management concepts: Monitoring, Taking Medication, Problem Solving and Reducing Risks    Based on learning assessment above, most appropriate setting for further diabetes education would be: Group class or Individual setting.      INTERVENTIONS:    Education provided today on:  AADE Self-Care Behaviors:  Being Active: relationship to blood glucose and precautions to take  Monitoring: individual blood glucose targets and frequency of monitoring  Taking Medication: action of prescribed medication, side effects of prescribed medications and when to take medications  Problem Solving: low blood glucose - causes, signs/symptoms, treatment and prevention and carrying a carbohydrate source at all times  Reducing Risks: appropriate dental care, annual eye exam, A1C - goals, relating to blood glucose levels, how often to check, lipids levels and goals and blood pressure and goals    Opportunities for ongoing education and support in diabetes-self management were discussed.    Pt verbalized understanding of concepts discussed and recommendations provided today.       Education Materials Provided:  No new materials provided today      ASSESSMENT:  Patient concerned that blood sugars are running higher since stopping metformin. He was told to take glipizide instead but states he was already taking this. Discussed increasing his glipizide dose to 5 mg/day with meals to help with  blood sugars. Reviewed s/sx & treatment of hypoglycemia. Recommended he attempt to test blood sugars twice daily, if possible, fasting & 2 hrs post-meal. Follow up planned in ~1 month for further review. Orders placed for A1C - he is getting other labs drawn today.     Patient's most recent   Lab Results   Component Value Date    A1C 6.2 01/19/2021    is meeting goal of <7.0    PLAN  Increase glipizide 2.5 mg --> 5 mg & always take with meals. Orders updated per CDE protocol.   Get A1C rechecked, orders placed per CDE protocol.   Check blood sugars 1-2 times daily & keep a log for next visit  Be aware of s/sx of hypoglycemia with increased glpizide dose   Follow up scheduled on 8/11    Cecy Anderson RD, LD, [facility] [place]   Time Spent: 21 minutes  Encounter Type: Individual    Any diabetes medication dose changes were made via the CDE Protocol and Collaborative Practice Agreement with the patient's referring provider. A copy of this encounter was shared with the provider.

## 2021-07-24 DIAGNOSIS — I10 HYPERTENSION, UNSPECIFIED TYPE: Primary | ICD-10-CM

## 2021-07-26 ENCOUNTER — LAB (OUTPATIENT)
Dept: LAB | Facility: CLINIC | Age: 61
End: 2021-07-26
Payer: COMMERCIAL

## 2021-07-26 DIAGNOSIS — I25.810 CORONARY ARTERY DISEASE INVOLVING AUTOLOGOUS ARTERY CORONARY BYPASS GRAFT WITHOUT ANGINA PECTORIS: ICD-10-CM

## 2021-07-26 DIAGNOSIS — E11.9 TYPE 2 DIABETES MELLITUS WITHOUT COMPLICATION, WITHOUT LONG-TERM CURRENT USE OF INSULIN (H): ICD-10-CM

## 2021-07-26 DIAGNOSIS — I10 HYPERTENSION, UNSPECIFIED TYPE: ICD-10-CM

## 2021-07-26 DIAGNOSIS — Z12.5 SCREENING FOR PROSTATE CANCER: ICD-10-CM

## 2021-07-26 DIAGNOSIS — I12.9 HYPERTENSION, RENAL: ICD-10-CM

## 2021-07-26 LAB
ALBUMIN SERPL-MCNC: 3.9 G/DL (ref 3.4–5)
ALBUMIN UR-MCNC: NEGATIVE MG/DL
ALT SERPL W P-5'-P-CCNC: 25 U/L (ref 0–70)
ANION GAP SERPL CALCULATED.3IONS-SCNC: 2 MMOL/L (ref 3–14)
APPEARANCE UR: CLEAR
BILIRUB UR QL STRIP: NEGATIVE
BUN SERPL-MCNC: 13 MG/DL (ref 7–30)
CALCIUM SERPL-MCNC: 9.1 MG/DL (ref 8.5–10.1)
CHLORIDE BLD-SCNC: 102 MMOL/L (ref 94–109)
CHOLEST SERPL-MCNC: 138 MG/DL
CO2 SERPL-SCNC: 33 MMOL/L (ref 20–32)
COLOR UR AUTO: YELLOW
CREAT SERPL-MCNC: 1.03 MG/DL (ref 0.66–1.25)
CREAT UR-MCNC: 109 MG/DL
DEPRECATED CALCIDIOL+CALCIFEROL SERPL-MC: 23 UG/L (ref 20–75)
ERYTHROCYTE [DISTWIDTH] IN BLOOD BY AUTOMATED COUNT: 14.1 % (ref 10–15)
FASTING STATUS PATIENT QL REPORTED: NO
GFR SERPL CREATININE-BSD FRML MDRD: 77 ML/MIN/1.73M2
GLUCOSE BLD-MCNC: 271 MG/DL (ref 70–99)
GLUCOSE UR STRIP-MCNC: 250 MG/DL
HBA1C MFR BLD: 6.3 % (ref 0–5.6)
HCT VFR BLD AUTO: 43.7 % (ref 40–53)
HDLC SERPL-MCNC: 39 MG/DL
HGB BLD-MCNC: 14.3 G/DL (ref 13.3–17.7)
HGB UR QL STRIP: NEGATIVE
KETONES UR STRIP-MCNC: NEGATIVE MG/DL
LDLC SERPL CALC-MCNC: 54 MG/DL
LEUKOCYTE ESTERASE UR QL STRIP: NEGATIVE
MCH RBC QN AUTO: 28.4 PG (ref 26.5–33)
MCHC RBC AUTO-ENTMCNC: 32.7 G/DL (ref 31.5–36.5)
MCV RBC AUTO: 87 FL (ref 78–100)
NITRATE UR QL: NEGATIVE
NONHDLC SERPL-MCNC: 99 MG/DL
PH UR STRIP: 7 [PH] (ref 5–7)
PHOSPHATE SERPL-MCNC: 2.2 MG/DL (ref 2.5–4.5)
PLATELET # BLD AUTO: 225 10E3/UL (ref 150–450)
POTASSIUM BLD-SCNC: 3.8 MMOL/L (ref 3.4–5.3)
PROT UR-MCNC: 0.16 G/L
PROT/CREAT 24H UR: 0.15 G/G CR (ref 0–0.2)
PSA SERPL-MCNC: 0.24 UG/L (ref 0–4)
PTH-INTACT SERPL-MCNC: 86 PG/ML (ref 18–80)
RBC # BLD AUTO: 5.03 10E6/UL (ref 4.4–5.9)
RBC #/AREA URNS AUTO: NORMAL /HPF
SODIUM SERPL-SCNC: 137 MMOL/L (ref 133–144)
SP GR UR STRIP: 1.01 (ref 1–1.03)
TRIGL SERPL-MCNC: 227 MG/DL
UROBILINOGEN UR STRIP-ACNC: 0.2 E.U./DL
WBC # BLD AUTO: 5.7 10E3/UL (ref 4–11)
WBC #/AREA URNS AUTO: NORMAL /HPF

## 2021-07-26 PROCEDURE — 84156 ASSAY OF PROTEIN URINE: CPT

## 2021-07-26 PROCEDURE — 80061 LIPID PANEL: CPT

## 2021-07-26 PROCEDURE — 81001 URINALYSIS AUTO W/SCOPE: CPT

## 2021-07-26 PROCEDURE — 82306 VITAMIN D 25 HYDROXY: CPT

## 2021-07-26 PROCEDURE — 83036 HEMOGLOBIN GLYCOSYLATED A1C: CPT

## 2021-07-26 PROCEDURE — G0103 PSA SCREENING: HCPCS

## 2021-07-26 PROCEDURE — 80069 RENAL FUNCTION PANEL: CPT

## 2021-07-26 PROCEDURE — 36415 COLL VENOUS BLD VENIPUNCTURE: CPT

## 2021-07-26 PROCEDURE — 84460 ALANINE AMINO (ALT) (SGPT): CPT

## 2021-07-26 PROCEDURE — 83970 ASSAY OF PARATHORMONE: CPT

## 2021-07-26 PROCEDURE — 85027 COMPLETE CBC AUTOMATED: CPT

## 2021-07-30 ENCOUNTER — VIRTUAL VISIT (OUTPATIENT)
Dept: NEPHROLOGY | Facility: CLINIC | Age: 61
End: 2021-07-30
Attending: STUDENT IN AN ORGANIZED HEALTH CARE EDUCATION/TRAINING PROGRAM
Payer: COMMERCIAL

## 2021-07-30 DIAGNOSIS — I12.9 HYPERTENSION, RENAL: Primary | ICD-10-CM

## 2021-07-30 PROCEDURE — 99214 OFFICE O/P EST MOD 30 MIN: CPT | Mod: 95 | Performed by: STUDENT IN AN ORGANIZED HEALTH CARE EDUCATION/TRAINING PROGRAM

## 2021-07-30 ASSESSMENT — PAIN SCALES - GENERAL: PAINLEVEL: NO PAIN (0)

## 2021-07-30 NOTE — LETTER
7/30/2021       RE: Adrian Petty  [address]  [place] [facility] [address]     Dear Colleague,    Thank you for referring your patient, Adrian Petty, to the [facility] [place] [facility]. Please see a copy of my visit note below.    Radu is a 61 year old who is being evaluated via a billable video visit.      How would you like to obtain your AVS? MyChart  If the video visit is dropped, the invitation should be resent by: Text to cell phone: [phone]  Will anyone else be joining your video visit? No      Video-Visit Details    Type of service:  Video Visit    Video Start Time: 9:30 AM    Video End Time:10:00am    Originating Location (pt. Location): Home    Distant Location (provider location):  [facility] [place]     Platform used for Video Visit: Hortencia Haji MD  Renal       Nephrology Clinic    Video Visit     SUBJECTIVE:   Adrian Petty is a 61 year old male with pmh of prostate cancer, HTN, and CAD s/p CABG 11/2020 who was found to have aldosterone of 40 and renin activity of <0.01 twice upon screening for hyperaldosteronism and returns to follow-up this and HTN.     The patient returns after 3 months, though he and I have followed up by phone. He states he continues to feel better after his late 2020 CABG, though he continues to state he feels unready for AVS for aldosteronism. Upon again reviewing his saline-loading test results, it became clear that his renin and aruna are not from the same time post-saline loading (only a pre-saline aruna is available), indicating the protocol was not correctly followed during the test. The patient stated he would gladly repeat the test to get accurate results.     Recent creatinine is 1.03 mg/dl. K is WNL, though bicarb remains high at 33. His phos is mildly low at 2.2. He states he has been dieting lately. A1c is  6.3%.    Home BPs: 130s/70s, pulse about 60.    He reports recent ankle edema when exercising but none lately. No SOB. CP with exertion is improving but not completely a resolved issue.    Review of systems:  4 point ROS negative except as noted above.    Past Medical History:   Diagnosis Date     Arthritis      CAD (coronary artery disease) 11/05/2020    3 vessel     Cancer (H) 2010    prostate     DM2 (diabetes mellitus, type 2) (H)     A1C was 8.0 on 11/4/20     Heart disease 2012    stents     HLD (hyperlipidemia)      Hypertension      NSTEMI (non-ST elevated myocardial infarction) (H) 11/05/2020     S/P CABG x 3 11/09/2020    LIMA to LAD, SVG to PDA, SVG to diag        acetaminophen (TYLENOL) 325 MG tablet, Take 2 tablets (650 mg) by mouth every 4 hours as needed for other (multimodal surgical pain management along with NSAIDS and opioid medication as indicated based on pain control and physical function.)  Alcohol Swabs PADS, Use to swab the area of the injection or anamika as directed   Per insurance coverage  amLODIPine (NORVASC) 10 MG tablet, Take 0.5 tablets (5 mg) by mouth daily  aspirin (ASA) 81 MG EC tablet, Take 1 tablet (81 mg) by mouth daily  blood glucose (NO BRAND SPECIFIED) lancets standard, To use to test glucose level in the blood  Use to test blood sugar  2  times daily as directed. To accompany glucose monitor brands per insurance coverage.  blood glucose (NO BRAND SPECIFIED) test strip, To use to test glucose level in the blood Use to test blood sugar  2 times daily as directed. To accompany glucose monitor brands per insurance coverage.  blood glucose calibration (NO BRAND SPECIFIED) solution, Used to calibrate the blood glucose monitor as needed and as directed.  To accompany  blood glucose brands per insurance coverage  blood glucose monitoring (NO BRAND SPECIFIED) meter device kit, Use as directed   Per insurance coverage  glipiZIDE (GLUCOTROL XL) 2.5 MG 24 hr tablet, Take 2 tablets (5 mg)  by mouth daily  hydrochlorothiazide (HYDRODIURIL) 12.5 MG tablet, Take 1 tablet (12.5 mg) by mouth daily  losartan (COZAAR) 100 MG tablet, Take 1 tablet (100 mg) by mouth daily  metoprolol tartrate (LOPRESSOR) 100 MG tablet, Take 1 tablet (100 mg) by mouth 2 times daily  nitroGLYcerin (NITROSTAT) 0.3 MG sublingual tablet, For chest pain place 1 tablet under the tongue every 5 minutes for 3 doses. If symptoms persist 5 minutes after 1st dose call 911.  potassium chloride ER (K-TAB) 20 MEQ CR tablet, Take 1 tablet (20 mEq) by mouth daily  rosuvastatin (CRESTOR) 40 MG tablet, Take 1 tablet (40 mg) by mouth daily  sertraline (ZOLOFT) 50 MG tablet, Take 50 mg by mouth daily  metFORMIN (GLUCOPHAGE-XR) 500 MG 24 hr tablet, Take 1 tablet (500 mg) by mouth 2 times daily (with meals) (Patient not taking: Reported on 7/14/2021)  potassium chloride ER (KLOR-CON M) 10 MEQ CR tablet, Take 2 tablets (20 mEq) by mouth daily (Patient taking differently: Take 10 mEq by mouth daily )    No current facility-administered medications on file prior to visit.       OBJECTIVE:  Physical exam:    Most recent renal panel reviewed. CBC, random urine protein, UA, vitamin D and PTH reviewed.     Recent Labs   Lab Test 07/26/21  1031 05/20/21  1325 04/29/21  1126     --  139   POTASSIUM 3.8 3.1* 4.1   CHLORIDE 102  --  107   CO2 33*  --  33*   ANIONGAP 2*  --  <1*   *  --  98   BUN 13  --  15   CR 1.03  --  1.00   NADEEN 9.1  --  9.3       Lab Results   Component Value Date    WBC 5.7 07/26/2021    WBC 6.2 04/29/2021     Lab Results   Component Value Date    RBC 5.03 07/26/2021    RBC 5.19 04/29/2021     Lab Results   Component Value Date    HGB 14.3 07/26/2021    HGB 14.1 04/29/2021     Lab Results   Component Value Date    HCT 43.7 07/26/2021    HCT 43.8 04/29/2021     No components found for: MCT  Lab Results   Component Value Date    MCV 87 07/26/2021    MCV 84 04/29/2021     Lab Results   Component Value Date    MCH 28.4 07/26/2021     MCH 27.2 04/29/2021     Lab Results   Component Value Date    MCHC 32.7 07/26/2021    MCHC 32.2 04/29/2021     Lab Results   Component Value Date    RDW 14.1 07/26/2021    RDW 14.8 04/29/2021     Lab Results   Component Value Date     07/26/2021     04/29/2021       Color Urine (no units)   Date Value   07/26/2021 Yellow   04/29/2021 Yellow     Appearance Urine (no units)   Date Value   07/26/2021 Clear   04/29/2021 Clear     Glucose Urine (mg/dL)   Date Value   07/26/2021 250  (A)   04/29/2021 Negative     Bilirubin Urine (no units)   Date Value   07/26/2021 Negative   04/29/2021 Negative     Ketones Urine (mg/dL)   Date Value   07/26/2021 Negative   04/29/2021 Negative     Specific Gravity Urine (no units)   Date Value   07/26/2021 1.010   04/29/2021 1.010     pH Urine   Date Value   07/26/2021 7.0   04/29/2021 7.0 pH     Protein Albumin Urine (mg/dL)   Date Value   07/26/2021 Negative   04/29/2021 Trace (A)     Urobilinogen Urine   Date Value   07/26/2021 0.2 E.U./dL   04/29/2021 0.2 EU/dL     Nitrite Urine (no units)   Date Value   07/26/2021 Negative   04/29/2021 Negative     Leukocyte Esterase Urine (no units)   Date Value   07/26/2021 Negative   04/29/2021 Negative           ASSESSMENT and PLAN:   Adrian was seen today for recheck.    Diagnoses and all orders for this visit:    Hypertension, renal  CAD s/p CABG with ongoing angina   Metabolic alkalosis   Upon further review of the patient's saline-loading aldosteronism testing, the times of the renin activity and aldosterone are very different and indicate the protocol was not followed. The patient stated he would happily agree to repeat this test to make the diagnosis of primary hyperaldosteronism (which we will arrange). He and I also talked about the potential to start spironolactone (with his home BPs mildly above a SPRINT-drive BP goal) so long as he wants to defer AVS (due to his recent CABG) and completing the workup. Given his  CAD/CABG and long-standing HTN, he is unlikely to get off all antihypertensives due to their dual cardiac role in his case. No change to medications today.   CT scan does not show adrenal tumor (or obvious hyperplasia), though he and I also discussed the well documented cases of microadenomas (too small to see by CT scan) in the medical literature causing aldosteronism.     Mild hypophosphatemia  ?due to shifting from alkalosis (usually seen in more acute alkalosis) vs. Nutritional. UA doesn't show urinary losses. Mildly elevated PTH could also cause hypophos, though serum calcium is WNL and vit D is in the 20s. Discussed increasing dairy intake and will CTM.     Potassium  Currently WNL.       Return to clinic in 2 months after repeating saline-loading test for aldosteronism.    Omer Haji MD  Nephrology  Pager: [phone]        Again, thank you for allowing me to participate in the care of your patient.      Sincerely,    Omer Haji MD

## 2021-07-30 NOTE — PROGRESS NOTES
Radu is a 61 year old who is being evaluated via a billable video visit.      How would you like to obtain your AVS? MyChart  If the video visit is dropped, the invitation should be resent by: Text to cell phone: [phone]  Will anyone else be joining your video visit? No      Video-Visit Details    Type of service:  Video Visit    Video Start Time: 9:30 AM    Video End Time:10:00am    Originating Location (pt. Location): Home    Distant Location (provider location):  [facility] [place]     Platform used for Video Visit: Hortencia Haji MD  Renal

## 2021-07-30 NOTE — PROGRESS NOTES
Nephrology Clinic    Video Visit     SUBJECTIVE:   Adrian Petty is a 61 year old male with pmh of prostate cancer, HTN, and CAD s/p CABG 11/2020 who was found to have aldosterone of 40 and renin activity of <0.01 twice upon screening for hyperaldosteronism and returns to follow-up this and HTN.     The patient returns after 3 months, though he and I have followed up by phone. He states he continues to feel better after his late 2020 CABG, though he continues to state he feels unready for AVS for aldosteronism. Upon again reviewing his saline-loading test results, it became clear that his renin and aruna are not from the same time post-saline loading (only a pre-saline aruna is available), indicating the protocol was not correctly followed during the test. The patient stated he would gladly repeat the test to get accurate results.     Recent creatinine is 1.03 mg/dl. K is WNL, though bicarb remains high at 33. His phos is mildly low at 2.2. He states he has been dieting lately. A1c is 6.3%.    Home BPs: 130s/70s, pulse about 60.    He reports recent ankle edema when exercising but none lately. No SOB. CP with exertion is improving but not completely a resolved issue.    Review of systems:  4 point ROS negative except as noted above.    Past Medical History:   Diagnosis Date     Arthritis      CAD (coronary artery disease) 11/05/2020    3 vessel     Cancer (H) 2010    prostate     DM2 (diabetes mellitus, type 2) (H)     A1C was 8.0 on 11/4/20     Heart disease 2012    stents     HLD (hyperlipidemia)      Hypertension      NSTEMI (non-ST elevated myocardial infarction) (H) 11/05/2020     S/P CABG x 3 11/09/2020    LIMA to LAD, SVG to PDA, SVG to diag        acetaminophen (TYLENOL) 325 MG tablet, Take 2 tablets (650 mg) by mouth every 4 hours as needed for other (multimodal surgical pain management along with NSAIDS and opioid medication as indicated based on pain control and physical function.)  Alcohol Swabs  PADS, Use to swab the area of the injection or anamika as directed   Per insurance coverage  amLODIPine (NORVASC) 10 MG tablet, Take 0.5 tablets (5 mg) by mouth daily  aspirin (ASA) 81 MG EC tablet, Take 1 tablet (81 mg) by mouth daily  blood glucose (NO BRAND SPECIFIED) lancets standard, To use to test glucose level in the blood  Use to test blood sugar  2  times daily as directed. To accompany glucose monitor brands per insurance coverage.  blood glucose (NO BRAND SPECIFIED) test strip, To use to test glucose level in the blood Use to test blood sugar  2 times daily as directed. To accompany glucose monitor brands per insurance coverage.  blood glucose calibration (NO BRAND SPECIFIED) solution, Used to calibrate the blood glucose monitor as needed and as directed.  To accompany  blood glucose brands per insurance coverage  blood glucose monitoring (NO BRAND SPECIFIED) meter device kit, Use as directed   Per insurance coverage  glipiZIDE (GLUCOTROL XL) 2.5 MG 24 hr tablet, Take 2 tablets (5 mg) by mouth daily  hydrochlorothiazide (HYDRODIURIL) 12.5 MG tablet, Take 1 tablet (12.5 mg) by mouth daily  losartan (COZAAR) 100 MG tablet, Take 1 tablet (100 mg) by mouth daily  metoprolol tartrate (LOPRESSOR) 100 MG tablet, Take 1 tablet (100 mg) by mouth 2 times daily  nitroGLYcerin (NITROSTAT) 0.3 MG sublingual tablet, For chest pain place 1 tablet under the tongue every 5 minutes for 3 doses. If symptoms persist 5 minutes after 1st dose call 911.  potassium chloride ER (K-TAB) 20 MEQ CR tablet, Take 1 tablet (20 mEq) by mouth daily  rosuvastatin (CRESTOR) 40 MG tablet, Take 1 tablet (40 mg) by mouth daily  sertraline (ZOLOFT) 50 MG tablet, Take 50 mg by mouth daily  metFORMIN (GLUCOPHAGE-XR) 500 MG 24 hr tablet, Take 1 tablet (500 mg) by mouth 2 times daily (with meals) (Patient not taking: Reported on 7/14/2021)  potassium chloride ER (KLOR-CON M) 10 MEQ CR tablet, Take 2 tablets (20 mEq) by mouth daily (Patient taking  differently: Take 10 mEq by mouth daily )    No current facility-administered medications on file prior to visit.       OBJECTIVE:  Physical exam:    Most recent renal panel reviewed. CBC, random urine protein, UA, vitamin D and PTH reviewed.     Recent Labs   Lab Test 07/26/21  1031 05/20/21  1325 04/29/21  1126     --  139   POTASSIUM 3.8 3.1* 4.1   CHLORIDE 102  --  107   CO2 33*  --  33*   ANIONGAP 2*  --  <1*   *  --  98   BUN 13  --  15   CR 1.03  --  1.00   NADEEN 9.1  --  9.3       Lab Results   Component Value Date    WBC 5.7 07/26/2021    WBC 6.2 04/29/2021     Lab Results   Component Value Date    RBC 5.03 07/26/2021    RBC 5.19 04/29/2021     Lab Results   Component Value Date    HGB 14.3 07/26/2021    HGB 14.1 04/29/2021     Lab Results   Component Value Date    HCT 43.7 07/26/2021    HCT 43.8 04/29/2021     No components found for: MCT  Lab Results   Component Value Date    MCV 87 07/26/2021    MCV 84 04/29/2021     Lab Results   Component Value Date    MCH 28.4 07/26/2021    MCH 27.2 04/29/2021     Lab Results   Component Value Date    MCHC 32.7 07/26/2021    MCHC 32.2 04/29/2021     Lab Results   Component Value Date    RDW 14.1 07/26/2021    RDW 14.8 04/29/2021     Lab Results   Component Value Date     07/26/2021     04/29/2021       Color Urine (no units)   Date Value   07/26/2021 Yellow   04/29/2021 Yellow     Appearance Urine (no units)   Date Value   07/26/2021 Clear   04/29/2021 Clear     Glucose Urine (mg/dL)   Date Value   07/26/2021 250  (A)   04/29/2021 Negative     Bilirubin Urine (no units)   Date Value   07/26/2021 Negative   04/29/2021 Negative     Ketones Urine (mg/dL)   Date Value   07/26/2021 Negative   04/29/2021 Negative     Specific Gravity Urine (no units)   Date Value   07/26/2021 1.010   04/29/2021 1.010     pH Urine   Date Value   07/26/2021 7.0   04/29/2021 7.0 pH     Protein Albumin Urine (mg/dL)   Date Value   07/26/2021 Negative   04/29/2021 Trace  (A)     Urobilinogen Urine   Date Value   07/26/2021 0.2 E.U./dL   04/29/2021 0.2 EU/dL     Nitrite Urine (no units)   Date Value   07/26/2021 Negative   04/29/2021 Negative     Leukocyte Esterase Urine (no units)   Date Value   07/26/2021 Negative   04/29/2021 Negative           ASSESSMENT and PLAN:   Adrian was seen today for recheck.    Diagnoses and all orders for this visit:    Hypertension, renal  CAD s/p CABG with ongoing angina   Metabolic alkalosis   Upon further review of the patient's saline-loading aldosteronism testing, the times of the renin activity and aldosterone are very different and indicate the protocol was not followed. The patient stated he would happily agree to repeat this test to make the diagnosis of primary hyperaldosteronism (which we will arrange). He and I also talked about the potential to start spironolactone (with his home BPs mildly above a SPRINT-drive BP goal) so long as he wants to defer AVS (due to his recent CABG) and completing the workup. Given his CAD/CABG and long-standing HTN, he is unlikely to get off all antihypertensives due to their dual cardiac role in his case. No change to medications today.   CT scan does not show adrenal tumor (or obvious hyperplasia), though he and I also discussed the well documented cases of microadenomas (too small to see by CT scan) in the medical literature causing aldosteronism.     Mild hypophosphatemia  ?due to shifting from alkalosis (usually seen in more acute alkalosis) vs. Nutritional. UA doesn't show urinary losses. Mildly elevated PTH could also cause hypophos, though serum calcium is WNL and vit D is in the 20s. Discussed increasing dairy intake and will CTM.     Potassium  Currently WNL.       Return to clinic in 2 months after repeating saline-loading test for aldosteronism.    Omer Haji MD  Nephrology  Pager: [phone]

## 2021-08-04 DIAGNOSIS — I12.9 HYPERTENSION, RENAL: Primary | ICD-10-CM

## 2021-08-04 DIAGNOSIS — E26.9 HYPERALDOSTERONISM (H): ICD-10-CM

## 2021-08-04 NOTE — PROGRESS NOTES
Per Dr. Haji, patient will need to redo saline suppression test. Patient aware. Therapy plan placed. Message to [facility] for scheduling.  ,Carmella Dugan LPN  Nephrology  [phone]

## 2021-08-06 ENCOUNTER — OFFICE VISIT (OUTPATIENT)
Dept: CARDIOLOGY | Facility: CLINIC | Age: 61
End: 2021-08-06
Payer: COMMERCIAL

## 2021-08-06 VITALS
SYSTOLIC BLOOD PRESSURE: 130 MMHG | WEIGHT: 233 LBS | BODY MASS INDEX: 33.36 KG/M2 | HEART RATE: 61 BPM | HEIGHT: 70 IN | DIASTOLIC BLOOD PRESSURE: 82 MMHG | OXYGEN SATURATION: 96 %

## 2021-08-06 DIAGNOSIS — Z95.1 S/P CABG X 3: ICD-10-CM

## 2021-08-06 DIAGNOSIS — I25.110 CORONARY ARTERY DISEASE INVOLVING NATIVE CORONARY ARTERY OF NATIVE HEART WITH UNSTABLE ANGINA PECTORIS (H): ICD-10-CM

## 2021-08-06 PROCEDURE — 99214 OFFICE O/P EST MOD 30 MIN: CPT | Performed by: INTERNAL MEDICINE

## 2021-08-06 ASSESSMENT — MIFFLIN-ST. JEOR: SCORE: 1858.13

## 2021-08-06 NOTE — PROGRESS NOTES
CARDIOLOGY CLINIC CONSULTATION    REASON FOR CONSULT: CAD    PRIMARY CARE PHYSICIAN:  Emma Barlow    HISTORY OF PRESENT ILLNESS:  Mr. Petty is a delightful 61-year-old gentleman with past cardiac history significant for the followin.  Coronary artery disease with stent placed initially in  in [place] at [facility], then in  presented with unstable angina/small non-ST elevation MI with severe 3-vessel coronary artery disease with in-stent restenosis of the mid-LAD, severe distal LAD lesion, small circumflex artery and occluded right with left-to-right collaterals.  Dr. Herrera performed a LIMA to the LAD, saphenous vein graft to the PDA and saphenous vein graft to the diagonal.  The circumflex was too small to bypass.   2.  Hypertension.  Under better control now  3.  Dyslipidemia.   4.  Type 2 diabetes  5.  History of prostate cancer with resection, chemo and radiation.   6.  Normal ejection fraction.  No significant valvular disease.      The patient is here for routine follow-up. Denies any cardiovascular symptoms.  No chest pain syncope presyncope shortness of breath.  No bleeding problems reported. He can walk a mile without symptoms. Complains of mild fatigue which may be due to BB use.     PAST MEDICAL HISTORY:  Past Medical History:   Diagnosis Date     Arthritis      CAD (coronary artery disease) 2020    3 vessel     Cancer (H)     prostate     DM2 (diabetes mellitus, type 2) (H)     A1C was 8.0 on 20     Heart disease 2012    stents     HLD (hyperlipidemia)      Hypertension      NSTEMI (non-ST elevated myocardial infarction) (H) 2020     S/P CABG x 3 2020    LIMA to LAD, SVG to PDA, SVG to diag       MEDICATIONS:  Current Outpatient Medications   Medication     Alcohol Swabs PADS     amLODIPine (NORVASC) 10 MG tablet     aspirin (ASA) 81 MG EC tablet     blood glucose (NO BRAND SPECIFIED) lancets standard     blood glucose (NO BRAND SPECIFIED) test strip      blood glucose calibration (NO BRAND SPECIFIED) solution     blood glucose monitoring (NO BRAND SPECIFIED) meter device kit     glipiZIDE (GLUCOTROL XL) 2.5 MG 24 hr tablet     hydrochlorothiazide (HYDRODIURIL) 12.5 MG tablet     losartan (COZAAR) 100 MG tablet     metoprolol tartrate (LOPRESSOR) 100 MG tablet     nitroGLYcerin (NITROSTAT) 0.3 MG sublingual tablet     potassium chloride ER (K-TAB) 20 MEQ CR tablet     rosuvastatin (CRESTOR) 40 MG tablet     sertraline (ZOLOFT) 50 MG tablet     No current facility-administered medications for this visit.       ALLERGIES:  No Known Allergies    SOCIAL HISTORY:  I have reviewed this patient's social history and updated it with pertinent information if needed. Adrian Sinha Malinda  reports that he has never smoked. He has quit using smokeless tobacco. He reports current alcohol use. He reports previous drug use.    FAMILY HISTORY:  I have reviewed this patient's family history and updated it with pertinent information if needed.   Family History   Problem Relation Age of Onset     Coronary Artery Disease Father      Diabetes Father      Diabetes Mother      Breast Cancer Mother        REVIEW OF SYSTEMS:  Skin:  Negative     Eyes:  Positive for glasses  ENT:  Negative    Respiratory:  Positive for cough  Cardiovascular:  Negative    Gastroenterology: Negative    Genitourinary:  Negative    Musculoskeletal:  Negative    Neurologic:  Negative    Psychiatric:  Negative    Heme/Lymph/Imm:  Negative    Endocrine:  Positive for diabetes      PHYSICAL EXAM:      BP: 130/82 Pulse: 61     SpO2: 96 %      Vital Signs with Ranges  Pulse:  [61] 61  BP: (130)/(82) 130/82  SpO2:  [96 %] 96 %  233 lbs 0 oz    Constitutional: awake, alert, no distress  Respiratory: Clear  Cardiovascular: Normal, Regular, no edema, normal JVP, no MRG  GI: nondistended  Neuropsychiatric: appropriate affact    DATA:  Labs: Pertinent cardiac labs reviewed    ASSESSMENT:  Stable severe CAD s/p CAB  2020    RECOMMENDATIONS:  1. Radu is doing well from a cardiac standpoint without symptoms.   2. His triglycerides are still elevated. I have told him to exercise and watch his diet more. I would like those less than 150. He will start OTC Fish oil and we will reassess need for alternative therapies if it does not come down with these changes in the next year.   3. HTN is stable now. He is on 5 mg of amlodipine (had edema at 10) and low dose hydrochlorothiazide, 100 BID of metoprolol and 100 mg of Losartan.   4. PCP to consider using Jardiance.   5. Otherwise healthy diet and exercise and weight loss.  6. See me in 1 year routinely, sooner if anything changes clinically.      PAMELLA Ferraro, [facility]  Cardiology - [facility] Heart  August 6, 2021

## 2021-08-06 NOTE — LETTER
2021    Emma Barlow MD  [address]  [place] [address]    RE: Adrian Petty       Dear Colleague,    I had the pleasure of seeing Adrian Petty in the [facility].    CARDIOLOGY CLINIC CONSULTATION    REASON FOR CONSULT: CAD    PRIMARY CARE PHYSICIAN:  Emma Barlow    HISTORY OF PRESENT ILLNESS:  Mr. Petty is a delightful 61-year-old gentleman with past cardiac history significant for the followin.  Coronary artery disease with stent placed initially in  in [place] at [facility], then in  presented with unstable angina/small non-ST elevation MI with severe 3-vessel coronary artery disease with in-stent restenosis of the mid-LAD, severe distal LAD lesion, small circumflex artery and occluded right with left-to-right collaterals.  Dr. Herrera performed a LIMA to the LAD, saphenous vein graft to the PDA and saphenous vein graft to the diagonal.  The circumflex was too small to bypass.   2.  Hypertension.  Under better control now  3.  Dyslipidemia.   4.  Type 2 diabetes  5.  History of prostate cancer with resection, chemo and radiation.   6.  Normal ejection fraction.  No significant valvular disease.      The patient is here for routine follow-up. Denies any cardiovascular symptoms.  No chest pain syncope presyncope shortness of breath.  No bleeding problems reported. He can walk a mile without symptoms. Complains of mild fatigue which may be due to BB use.     PAST MEDICAL HISTORY:  Past Medical History:   Diagnosis Date     Arthritis      CAD (coronary artery disease) 2020    3 vessel     Cancer (H)     prostate     DM2 (diabetes mellitus, type 2) (H)     A1C was 8.0 on 20     Heart disease 2012    stents     HLD (hyperlipidemia)      Hypertension      NSTEMI (non-ST elevated myocardial infarction) (H) 2020     S/P CABG x 3 2020    LIMA to LAD, SVG to PDA, SVG to diag        MEDICATIONS:  Current Outpatient Medications   Medication     Alcohol Swabs PADS     amLODIPine (NORVASC) 10 MG tablet     aspirin (ASA) 81 MG EC tablet     blood glucose (NO BRAND SPECIFIED) lancets standard     blood glucose (NO BRAND SPECIFIED) test strip     blood glucose calibration (NO BRAND SPECIFIED) solution     blood glucose monitoring (NO BRAND SPECIFIED) meter device kit     glipiZIDE (GLUCOTROL XL) 2.5 MG 24 hr tablet     hydrochlorothiazide (HYDRODIURIL) 12.5 MG tablet     losartan (COZAAR) 100 MG tablet     metoprolol tartrate (LOPRESSOR) 100 MG tablet     nitroGLYcerin (NITROSTAT) 0.3 MG sublingual tablet     potassium chloride ER (K-TAB) 20 MEQ CR tablet     rosuvastatin (CRESTOR) 40 MG tablet     sertraline (ZOLOFT) 50 MG tablet     No current facility-administered medications for this visit.       ALLERGIES:  No Known Allergies    SOCIAL HISTORY:  I have reviewed this patient's social history and updated it with pertinent information if needed. Adrian Petty  reports that he has never smoked. He has quit using smokeless tobacco. He reports current alcohol use. He reports previous drug use.    FAMILY HISTORY:  I have reviewed this patient's family history and updated it with pertinent information if needed.   Family History   Problem Relation Age of Onset     Coronary Artery Disease Father      Diabetes Father      Diabetes Mother      Breast Cancer Mother        REVIEW OF SYSTEMS:  Skin:  Negative     Eyes:  Positive for glasses  ENT:  Negative    Respiratory:  Positive for cough  Cardiovascular:  Negative    Gastroenterology: Negative    Genitourinary:  Negative    Musculoskeletal:  Negative    Neurologic:  Negative    Psychiatric:  Negative    Heme/Lymph/Imm:  Negative    Endocrine:  Positive for diabetes      PHYSICAL EXAM:      BP: 130/82 Pulse: 61     SpO2: 96 %      Vital Signs with Ranges  Pulse:  [61] 61  BP: (130)/(82) 130/82  SpO2:  [96 %] 96 %  233 lbs 0  oz    Constitutional: awake, alert, no distress  Respiratory: Clear  Cardiovascular: Normal, Regular, no edema, normal JVP, no MRG  GI: nondistended  Neuropsychiatric: appropriate affact    DATA:  Labs: Pertinent cardiac labs reviewed    ASSESSMENT:  Stable severe CAD s/p CAB 2020    RECOMMENDATIONS:  1. Radu is doing well from a cardiac standpoint without symptoms.   2. His triglycerides are still elevated. I have told him to exercise and watch his diet more. I would like those less than 150. He will start OTC Fish oil and we will reassess need for alternative therapies if it does not come down with these changes in the next year.   3. HTN is stable now. He is on 5 mg of amlodipine (had edema at 10) and low dose hydrochlorothiazide, 100 BID of metoprolol and 100 mg of Losartan.   4. PCP to consider using Jardiance.   5. Otherwise healthy diet and exercise and weight loss.  6. See me in 1 year routinely, sooner if anything changes clinically.      PAMELLA Ferraro, [facility]  Cardiology - [facility] Heart  August 6, 2021            Thank you for allowing me to participate in the care of your patient.      Sincerely,     Andrea Mendes MD     [facility]  cc:   Cade Jacobo, J LUIS  [address] [address]

## 2021-08-16 DIAGNOSIS — Z11.59 ENCOUNTER FOR SCREENING FOR OTHER VIRAL DISEASES: Primary | ICD-10-CM

## 2021-09-23 DIAGNOSIS — Z95.1 S/P CABG (CORONARY ARTERY BYPASS GRAFT): ICD-10-CM

## 2021-09-23 RX ORDER — LOSARTAN POTASSIUM 100 MG/1
100 TABLET ORAL DAILY
Qty: 90 TABLET | Refills: 3 | Status: SHIPPED | OUTPATIENT
Start: 2021-09-23

## 2021-10-01 ENCOUNTER — VIRTUAL VISIT (OUTPATIENT)
Dept: NEPHROLOGY | Facility: CLINIC | Age: 61
End: 2021-10-01
Attending: STUDENT IN AN ORGANIZED HEALTH CARE EDUCATION/TRAINING PROGRAM
Payer: COMMERCIAL

## 2021-10-01 DIAGNOSIS — Z53.9 NO SHOW: Primary | ICD-10-CM

## 2021-10-01 PROCEDURE — 99207 PR NO BILLABLE SERVICE THIS VISIT: CPT | Performed by: STUDENT IN AN ORGANIZED HEALTH CARE EDUCATION/TRAINING PROGRAM

## 2021-10-01 NOTE — PROGRESS NOTES
Brief Nephrology Note    Patient did not answer 3 attempted phone calls from me or present in the [facility] video visit. I left a message that he will need to reschedule.     Sandor Haji MD  Renal

## 2021-10-01 NOTE — LETTER
10/1/2021       RE: Adrian Petty  [address]  [place] [address]     Dear Colleague,    Thank you for referring your patient, Adrian Petty, to the [facility]. Please see a copy of my visit note below.    Brief Nephrology Note    Patient did not answer 3 attempted phone calls from me or present in the well video visit. I left a message that he will need to reschedule.     Sandor Haji MD  Renal       Again, thank you for allowing me to participate in the care of your patient.      Sincerely,    Omer Haji MD

## 2021-10-11 ENCOUNTER — HEALTH MAINTENANCE LETTER (OUTPATIENT)
Age: 61
End: 2021-10-11

## 2021-10-29 DIAGNOSIS — Z95.1 S/P CABG X 3: ICD-10-CM

## 2021-10-29 RX ORDER — METOPROLOL TARTRATE 100 MG
100 TABLET ORAL 2 TIMES DAILY
Qty: 180 TABLET | Refills: 2 | Status: SHIPPED | OUTPATIENT
Start: 2021-10-29 | End: 2023-01-11

## 2021-11-29 DIAGNOSIS — Z95.1 S/P CABG X 3: ICD-10-CM

## 2021-12-05 ENCOUNTER — HEALTH MAINTENANCE LETTER (OUTPATIENT)
Age: 61
End: 2021-12-05

## 2022-01-30 ENCOUNTER — HEALTH MAINTENANCE LETTER (OUTPATIENT)
Age: 62
End: 2022-01-30

## 2022-02-28 ENCOUNTER — TRANSCRIBE ORDERS (OUTPATIENT)
Dept: OTHER | Age: 62
End: 2022-02-28
Payer: COMMERCIAL

## 2022-02-28 DIAGNOSIS — M48.061 SPINAL STENOSIS OF LUMBAR REGION, UNSPECIFIED WHETHER NEUROGENIC CLAUDICATION PRESENT: Primary | ICD-10-CM

## 2022-03-16 ENCOUNTER — TELEPHONE (OUTPATIENT)
Dept: NEUROSURGERY | Facility: CLINIC | Age: 62
End: 2022-03-16
Payer: COMMERCIAL

## 2022-03-16 NOTE — TELEPHONE ENCOUNTER
SPINE PATIENTS - NEW PROTOCOL PREVISIT    RECORDS RECEIVED FROM: External   REASON FOR VISIT: 2ND OPINION/ Spinal stenosis of lumbar region, unspecified whether neurogenic claudication   Date of Appt: 3/25/22   NOTES (FOR ALL VISITS) STATUS DETAILS   OFFICE NOTE from referring provider Care Everywhere Dr Elvira Hankins @ [facility] PCP:  2/27/22 encounter  2/14/22   OFFICE NOTE from other specialist Care Everywhere Dr Ney Velásquez @ [facility]:  2/10/22   MEDICATION LIST Care Everywhere    IMAGING  (FOR ALL VISITS)     MRI (HEAD, NECK, SPINE) Received [facility]:  MRI Lumbar Spine 1/25/22      Action 3/16/22 MV 12.33pm   Action Taken Imaging request faxed to [facility]   UPDATE 2.49pm: images resolved in PACS

## 2022-03-21 ENCOUNTER — TELEPHONE (OUTPATIENT)
Dept: NEUROSURGERY | Facility: CLINIC | Age: 62
End: 2022-03-21
Payer: COMMERCIAL

## 2022-03-21 DIAGNOSIS — M43.16 SPONDYLOLISTHESIS OF LUMBAR REGION: Primary | ICD-10-CM

## 2022-03-21 NOTE — TELEPHONE ENCOUNTER
[facility] Call Center    Phone Message    May a detailed message be left on voicemail: yes     Reason for Call: Other: Patient requests a call back concerning surgery.  Please call ASAP.     Action Taken: Message routed to:  [facility] ([facility]    Travel Screening: Not Applicable

## 2022-03-21 NOTE — TELEPHONE ENCOUNTER
Called patient.  He is concerned that the clinic near his home cannot do his surgery until May.    Informed patient that Dr. Arriaga would know urgency once he has the visit. Informed patient that Dr. Arriaga ordered additional imaging for him.    Writer scheduled the imaging and informed patient of the times.    Misty Lyons  Neurosurgery Manager  ]

## 2022-03-25 ENCOUNTER — PRE VISIT (OUTPATIENT)
Dept: NEUROSURGERY | Facility: CLINIC | Age: 62
End: 2022-03-25

## 2022-03-27 ENCOUNTER — HEALTH MAINTENANCE LETTER (OUTPATIENT)
Age: 62
End: 2022-03-27

## 2022-07-17 ENCOUNTER — HEALTH MAINTENANCE LETTER (OUTPATIENT)
Age: 62
End: 2022-07-17

## 2022-09-24 ENCOUNTER — HEALTH MAINTENANCE LETTER (OUTPATIENT)
Age: 62
End: 2022-09-24

## 2023-01-11 DIAGNOSIS — Z95.1 S/P CABG X 3: ICD-10-CM

## 2023-01-11 RX ORDER — METOPROLOL TARTRATE 100 MG
100 TABLET ORAL 2 TIMES DAILY
Qty: 180 TABLET | Refills: 0 | Status: SHIPPED | OUTPATIENT
Start: 2023-01-11

## 2023-01-11 NOTE — LETTER
January 11, 2023       TO: Adrian Petty  [address] [place] [address]       Dear Adrian Petty,    We recently received a call from your pharmacy requesting a refill of your medication(s).    Our records indicate that you are due for follow-up with your Heart Care Provider. We will refill your medications for 3 months which will allow you enough time to be seen.    Please call [phone] to schedule your appointment.    Thank you for allowing [facility] to be a part of your health care team and we look forward to seeing you soon.    Thank you,    [facility]

## 2023-01-29 ENCOUNTER — HEALTH MAINTENANCE LETTER (OUTPATIENT)
Age: 63
End: 2023-01-29

## 2023-05-08 ENCOUNTER — HEALTH MAINTENANCE LETTER (OUTPATIENT)
Age: 63
End: 2023-05-08

## 2023-09-06 ENCOUNTER — TELEPHONE (OUTPATIENT)
Dept: CARDIOLOGY | Facility: CLINIC | Age: 63
End: 2023-09-06
Payer: COMMERCIAL

## 2023-09-06 NOTE — TELEPHONE ENCOUNTER
I returned a fax via EPS from [facility] requesting a refill of Metoprolol. Patient has not been seen since 2021. We are unable to fill any medication orders unless patient is seen in our clinic. Please send them to his primary provider.

## 2023-10-14 ENCOUNTER — HEALTH MAINTENANCE LETTER (OUTPATIENT)
Age: 63
End: 2023-10-14

## 2024-01-25 ENCOUNTER — LAB REQUISITION (OUTPATIENT)
Dept: LAB | Facility: CLINIC | Age: 64
End: 2024-01-25
Payer: COMMERCIAL

## 2024-01-25 DIAGNOSIS — D48.9 NEOPLASM OF UNCERTAIN BEHAVIOR, UNSPECIFIED: ICD-10-CM

## 2024-01-25 PROCEDURE — 88305 TISSUE EXAM BY PATHOLOGIST: CPT | Mod: 26 | Performed by: DERMATOLOGY

## 2024-01-25 PROCEDURE — 88304 TISSUE EXAM BY PATHOLOGIST: CPT | Mod: TC,ORL | Performed by: DERMATOLOGY

## 2024-01-25 PROCEDURE — 88304 TISSUE EXAM BY PATHOLOGIST: CPT | Mod: 26 | Performed by: DERMATOLOGY

## 2024-01-30 LAB
PATH REPORT.COMMENTS IMP SPEC: NORMAL
PATH REPORT.COMMENTS IMP SPEC: NORMAL
PATH REPORT.FINAL DX SPEC: NORMAL
PATH REPORT.GROSS SPEC: NORMAL
PATH REPORT.MICROSCOPIC SPEC OTHER STN: NORMAL
PATH REPORT.RELEVANT HX SPEC: NORMAL

## 2024-02-02 NOTE — LETTER
2021    Emma Barlow MD  [address]  [place] [facility] [place] [address]    RE: Adrian Petty       Dear Colleague,    I had the pleasure of seeing Adrian Petty in the [facility].    CARDIOLOGY CLINIC CONSULTATION    REASON FOR CONSULT: Coronary artery disease and bypass surgery    PRIMARY CARE PHYSICIAN:  Emma Barlow    HISTORY OF PRESENT ILLNESS:  Mr. Petty is a delightful 60-year-old gentleman with past cardiac history significant for the followin.  Coronary artery disease with stent placed initially in  in [place] at , with recent presentation with unstable angina/small non-ST elevation MI with a troponin of 0.12 with severe 3-vessel coronary artery disease with in-stent restenosis of the mid-LAD, severe distal LAD lesion, small circumflex artery and occluded right with left-to-right collaterals.  He was referred for CABG on .  Dr. Herrera performed a LIMA to the LAD, saphenous vein graft to the PDA and saphenous vein graft to the diagonal.  The circumflex was too small to bypass.   2.  Hypertension, with the patient having markedly elevated blood pressures in the 200 systolic over 100 for over a year.  Under better control now  3.  Dyslipidemia.   4.  Type 2 diabetes, new diagnosis.   5.  History of prostate cancer with resection, chemo and radiation.   6.  Normal ejection fraction.  No significant valvular disease.     The patient is here for routine follow-up.  He saw me in the hospital in consultation and wants to follow-up with me.  Denies any cardiovascular symptoms and follow-up.  No chest pain syncope presyncope shortness of breath.  He does have some tingling around the site of the sternal incision but overall has been healing okay.  He has been having some abdominal side effects from Metformin.  Trista had reduced his aspirin down to 81 mg due to possible gastritis.  No bleeding problems reported.    PAST MEDICAL  Patient came at the triage desk and asking when he will go back in the room to see the doctor. Writer explained patient that could not give an exact estimated time as is not firs come first serve and also we are trying to do our best to get patient in and out. Patient started raising his voice and said \"I'm note someone that bite my words and I don't regret what I say but you guys should do better\". Patient returned back to the lobby.   HISTORY:  Past Medical History:   Diagnosis Date     Arthritis      CAD (coronary artery disease) 11/05/2020    3 vessel     Cancer (H) 2010    prostate     DM2 (diabetes mellitus, type 2) (H)     A1C was 8.0 on 11/4/20     Heart disease 2012    stents     HLD (hyperlipidemia)      Hypertension      NSTEMI (non-ST elevated myocardial infarction) (H) 11/05/2020     S/P CABG x 3 11/09/2020    LIMA to LAD, SVG to PDA, SVG to diag       MEDICATIONS:  Current Outpatient Medications   Medication     Alcohol Swabs PADS     amLODIPine (NORVASC) 10 MG tablet     aspirin (ASA) 81 MG EC tablet     blood glucose (NO BRAND SPECIFIED) lancets standard     blood glucose (NO BRAND SPECIFIED) test strip     blood glucose calibration (NO BRAND SPECIFIED) solution     blood glucose monitoring (NO BRAND SPECIFIED) meter device kit     busPIRone (BUSPAR) 10 MG tablet     glipiZIDE (GLUCOTROL XL) 2.5 MG 24 hr tablet     losartan (COZAAR) 100 MG tablet     metFORMIN (GLUCOPHAGE-XR) 500 MG 24 hr tablet     metoprolol tartrate (LOPRESSOR) 100 MG tablet     potassium chloride ER (KLOR-CON M) 10 MEQ CR tablet     rosuvastatin (CRESTOR) 40 MG tablet     sertraline (ZOLOFT) 50 MG tablet     acetaminophen (TYLENOL) 325 MG tablet     nitroGLYcerin (NITROSTAT) 0.3 MG sublingual tablet     No current facility-administered medications for this visit.        ALLERGIES:  No Known Allergies    SOCIAL HISTORY:  I have reviewed this patient's social history and updated it with pertinent information if needed. Adrian Sinha Malinda  reports that he has never smoked. He has quit using smokeless tobacco. He reports current alcohol use. He reports previous drug use.    FAMILY HISTORY:  I have reviewed this patient's family history and updated it with pertinent information if needed.   Family History   Problem Relation Age of Onset     Coronary Artery Disease Father      Diabetes Father      Diabetes Mother      Breast Cancer Mother        REVIEW OF  SYSTEMS:  Skin:  Negative     Eyes:  Negative    ENT:  Negative    Respiratory:  Negative    Cardiovascular:    Positive for;chest pain  Gastroenterology: Negative    Genitourinary:  Positive for prostate problem  Musculoskeletal:  Negative    Neurologic:  Negative    Psychiatric:  Negative    Heme/Lymph/Imm:  Negative    Endocrine:  Positive for diabetes      PHYSICAL EXAM:      BP: 139/88 Pulse: 64            Vital Signs with Ranges  Pulse:  [64] 64  BP: (139)/(88) 139/88  220 lbs 0 oz    Constitutional: awake, alert, no distress  Eyes: sclera nonicteric  ENT: trachea midline  Respiratory: Clear to auscultation bilaterally  Cardiovascular: Normal heart sounds.  Soft systolic murmur no rubs or gallops.  JVP is normal no edema.  GI: nondistended, nontender  Lymph/Hematologic: no bleeding signs  Musculoskeletal: good muscle tone, strength 5/5 in upper and lower extremities  Neuropsychiatric: appropriate affact    DATA:  Labs: Pertinent cardiac labs reviewed    Echocardiogram: Normal LV function    ASSESSMENT:  Coronary artery disease status post three-vessel bypass surgery November 2020  Uncontrolled hypertension  Diabetes  Prior history of prostate cancer  Obesity  Hypertriglyceridemia    RECOMMENDATIONS:  1. At this point the patient is doing well after bypass surgery.  The sternal incision seems to be of healed nicely.  Denies any anginal or heart failure symptoms.  2. At this point I recommend continuing aspirin and statin for life.  3. We will check basic labs including basic metabolic panel CBC and lipid panel to establish baseline after surgery.  LDL goal of 50-70.  4. If the triglyceride levels are disproportionately high, recommend initiation of Vascepa.  5. I have sent out a note to his primary physician to see if they can consider Jardiance  6. His blood pressure numbers are better.  To follow-up with PCP.  He tells me that his home blood pressure cuff is not working well and I told him to get it checked  and looked at.  7. See us back in 6 months with an echocardiogram sooner if anything changes clinically.    PAMELLA Ferraro, [facility]  Cardiology - [facility] Heart  January 14, 2021      Thank you for allowing me to participate in the care of your patient.    Sincerely,     Andrea Mendes MD     [facility]

## 2024-03-02 ENCOUNTER — HEALTH MAINTENANCE LETTER (OUTPATIENT)
Age: 64
End: 2024-03-02

## 2024-07-14 ENCOUNTER — HEALTH MAINTENANCE LETTER (OUTPATIENT)
Age: 64
End: 2024-07-14

## (undated) DEVICE — SUCTION CATH AIRLIFE TRI-FLO W/CONTROL PORT 14FR  T60C

## (undated) DEVICE — PREP CHLORAPREP W/ORANGE TINT 10.5ML 930715

## (undated) DEVICE — DRSG KERLIX 4 1/2"X4YDS ROLL 6715

## (undated) DEVICE — BONE WAX OSTENE 3.5GM CT410

## (undated) DEVICE — SOL RINGERS LACTATED 1000ML BAG 2B2324X

## (undated) DEVICE — SUCTION CANISTER MEDIVAC LINER 3000ML W/LID 65651-530

## (undated) DEVICE — MEDITRACE MULTIFUNTION ADULT RADIOTRANSPARENT ELECTRODE FOR ZOLL

## (undated) DEVICE — PACK OPEN HEART PV12OH524

## (undated) DEVICE — MANIFOLD KIT ANGIO AUTOMATED 014613

## (undated) DEVICE — DEVICE ASSEMBLY SUCTION/ANTI COAG BTC93

## (undated) DEVICE — SU VICRYL 0 CTX 36" J370H

## (undated) DEVICE — CANNULA PERFUSION VENOUS 3 STAGE 29FR 15" 91429

## (undated) DEVICE — LEAD PACER MYOCARDIAL BIPOLAR TEMPORARY 53CM 6495F

## (undated) DEVICE — SPONGE PEANUT

## (undated) DEVICE — PACK MINI VAC CUSTOM CARDOPULMONARY BB5Z97R15

## (undated) DEVICE — CABLE MYO/LEAD PACING WHITE DISP 019-530

## (undated) DEVICE — DEVICE TISSUE STABILIZATION OCTOBASE 28707

## (undated) DEVICE — PUNCH AORTIC 4.0MMX8" RCB40

## (undated) DEVICE — BLOWER/MISTER CLEARVIEW 22150

## (undated) DEVICE — Device

## (undated) DEVICE — SUCTION MINISQUAIR SMOKE EVAC CAPTURE DEVICE SQ20012-01

## (undated) DEVICE — KIT HAND CONTROL ANGIOTOUCH ACIST 65CM AT-P65

## (undated) DEVICE — SURGICEL HEMOSTAT 2X14" 1951

## (undated) DEVICE — GLOVE PROTEXIS W/NEU-THERA 7.5  2D73TE75

## (undated) DEVICE — PACK TUBING MINI VAC CUSTOM 1/2X3/8T BB9J78R4

## (undated) DEVICE — KIT ENDO VASOVIEW HEMOPRO 2 VH-4000

## (undated) DEVICE — DECANTER BAG 2002S

## (undated) DEVICE — SU ETHIBOND 0 CT-1 CR 8X18" CX21D

## (undated) DEVICE — SU PROLENE 3-0 SHDA 36" 8522H

## (undated) DEVICE — SU PLEDGET SOFT TFE 3/8"X3/26"X1/16" PCP40

## (undated) DEVICE — SU PROLENE 7-0 BV-1DA 4X30" M8703

## (undated) DEVICE — SU VICRYL 2-0 CT-1 27" UND J259H

## (undated) DEVICE — SYR EAR BULB 3OZ 0035830

## (undated) DEVICE — RESERVOIR CELL SAVING BLOOD COLLECTION EL2120

## (undated) DEVICE — INTRO GLIDESHEATH SLENDER 6FR 10X45CM 60-1060

## (undated) DEVICE — COVER TABLE POLY 65X90" 8186

## (undated) DEVICE — SU SILK 1 TIE 10X30" SA87G

## (undated) DEVICE — LINEN TOWEL PACK X5 5464

## (undated) DEVICE — SOMASENSOR CEREBRAL OXIMETER ADULT SAFB-SM

## (undated) DEVICE — PROTECTOR ARM ONE-STEP TRENDELENBURG 40418

## (undated) DEVICE — SU PROLENE 7-0 BV175-6 24' M8737

## (undated) DEVICE — CONNECTOR DRAIN CHEST Y EXTENSION SET 19909

## (undated) DEVICE — SU VICRYL 3-0 FS-1 27" J442H

## (undated) DEVICE — LINEN TOWEL PACK X6 WHITE 5487

## (undated) DEVICE — CONNECTOR PREFUSION REDUCER 3/8X1/2" W/O LL 6013

## (undated) DEVICE — SU ETHIBOND 2-0 SHDA 30" X563H

## (undated) DEVICE — SU PROLENE 6-0 C-1DA 30" M8706

## (undated) DEVICE — LINEN TOWEL PACK X30 5481

## (undated) DEVICE — KIT WASH CELL SAVING ATL2001

## (undated) DEVICE — SLEEVE TR BAND RADIAL COMPRESSION DEVICE 24CM TRB24-REG

## (undated) DEVICE — SU STEEL 6 CCS 4X18" M654G

## (undated) DEVICE — TOTE ANGIO CORP PC15AT SAN32CC83O

## (undated) DEVICE — SU PROLENE 4-0 SHDA 36" 8521H

## (undated) DEVICE — BLADE KNIFE BEAVER 6900

## (undated) DEVICE — CANNULA PERFUSION ARTERIAL 20FR 12" 77420

## (undated) DEVICE — SOL NACL 0.9% IRRIG 1000ML BOTTLE 2F7124

## (undated) DEVICE — DRAIN CHEST TUBE 32FR STR 8032

## (undated) DEVICE — CATH DIAGNOSTIC RADIAL 5FR TIG 4.0

## (undated) DEVICE — RX SURGIFLO HEMOSTATIC MATRIX W/THROMBIN 8ML 2994

## (undated) DEVICE — SU VICRYL 3-0 KS 27" UND J663H

## (undated) DEVICE — SU PROLENE 4-0 RB-1DA 36" 8557H

## (undated) DEVICE — LINEN LEG ROLL 5489

## (undated) DEVICE — POSITIONER ASSIST ESSTECH 3S T401210S

## (undated) DEVICE — SU ETHIBOND 3-0 BBDA 36" X588H

## (undated) DEVICE — CANNULA PERFUSION AORTIC ROOT 22FR 20012

## (undated) RX ORDER — VERAPAMIL HYDROCHLORIDE 2.5 MG/ML
INJECTION, SOLUTION INTRAVENOUS
Status: DISPENSED
Start: 2020-11-05

## (undated) RX ORDER — VECURONIUM BROMIDE 1 MG/ML
INJECTION, POWDER, LYOPHILIZED, FOR SOLUTION INTRAVENOUS
Status: DISPENSED
Start: 2020-11-09

## (undated) RX ORDER — HEPARIN SODIUM 1000 [USP'U]/ML
INJECTION, SOLUTION INTRAVENOUS; SUBCUTANEOUS
Status: DISPENSED
Start: 2020-11-09

## (undated) RX ORDER — PROPOFOL 10 MG/ML
INJECTION, EMULSION INTRAVENOUS
Status: DISPENSED
Start: 2020-11-09

## (undated) RX ORDER — HEPARIN SODIUM 200 [USP'U]/100ML
INJECTION, SOLUTION INTRAVENOUS
Status: DISPENSED
Start: 2020-11-05

## (undated) RX ORDER — HYDRALAZINE HYDROCHLORIDE 20 MG/ML
INJECTION INTRAMUSCULAR; INTRAVENOUS
Status: DISPENSED
Start: 2020-11-05

## (undated) RX ORDER — ONDANSETRON 2 MG/ML
INJECTION INTRAMUSCULAR; INTRAVENOUS
Status: DISPENSED
Start: 2020-11-05

## (undated) RX ORDER — FENTANYL CITRATE 0.05 MG/ML
INJECTION, SOLUTION INTRAMUSCULAR; INTRAVENOUS
Status: DISPENSED
Start: 2020-11-09

## (undated) RX ORDER — ACETAMINOPHEN 325 MG/1
TABLET ORAL
Status: DISPENSED
Start: 2020-11-05

## (undated) RX ORDER — LIDOCAINE HYDROCHLORIDE 20 MG/ML
INJECTION, SOLUTION EPIDURAL; INFILTRATION; INTRACAUDAL; PERINEURAL
Status: DISPENSED
Start: 2020-11-09

## (undated) RX ORDER — PROTAMINE SULFATE 10 MG/ML
INJECTION, SOLUTION INTRAVENOUS
Status: DISPENSED
Start: 2020-11-09

## (undated) RX ORDER — PAPAVERINE HYDROCHLORIDE 30 MG/ML
INJECTION INTRAMUSCULAR; INTRAVENOUS
Status: DISPENSED
Start: 2020-11-09

## (undated) RX ORDER — ALBUMIN, HUMAN INJ 5% 5 %
SOLUTION INTRAVENOUS
Status: DISPENSED
Start: 2020-11-09

## (undated) RX ORDER — FENTANYL CITRATE 50 UG/ML
INJECTION, SOLUTION INTRAMUSCULAR; INTRAVENOUS
Status: DISPENSED
Start: 2020-11-05

## (undated) RX ORDER — HEPARIN SODIUM 1000 [USP'U]/ML
INJECTION, SOLUTION INTRAVENOUS; SUBCUTANEOUS
Status: DISPENSED
Start: 2020-11-05

## (undated) RX ORDER — CEFAZOLIN SODIUM 2 G/100ML
INJECTION, SOLUTION INTRAVENOUS
Status: DISPENSED
Start: 2020-11-09

## (undated) RX ORDER — CEFAZOLIN SODIUM 1 G/3ML
INJECTION, POWDER, FOR SOLUTION INTRAMUSCULAR; INTRAVENOUS
Status: DISPENSED
Start: 2020-11-09

## (undated) RX ORDER — LIDOCAINE HYDROCHLORIDE 10 MG/ML
INJECTION, SOLUTION EPIDURAL; INFILTRATION; INTRACAUDAL; PERINEURAL
Status: DISPENSED
Start: 2020-11-05

## (undated) RX ORDER — NITROGLYCERIN 5 MG/ML
VIAL (ML) INTRAVENOUS
Status: DISPENSED
Start: 2020-11-05

## (undated) RX ORDER — ASPIRIN 81 MG/1
TABLET ORAL
Status: DISPENSED
Start: 2020-11-05

## (undated) RX ORDER — NITROGLYCERIN 0.4 MG/1
TABLET SUBLINGUAL
Status: DISPENSED
Start: 2020-11-05